# Patient Record
Sex: FEMALE | Race: WHITE | NOT HISPANIC OR LATINO | Employment: FULL TIME | URBAN - METROPOLITAN AREA
[De-identification: names, ages, dates, MRNs, and addresses within clinical notes are randomized per-mention and may not be internally consistent; named-entity substitution may affect disease eponyms.]

---

## 2017-01-24 ENCOUNTER — HOSPITAL ENCOUNTER (OUTPATIENT)
Dept: RADIOLOGY | Facility: MEDICAL CENTER | Age: 52
End: 2017-01-24
Attending: FAMILY MEDICINE
Payer: COMMERCIAL

## 2017-01-24 DIAGNOSIS — R07.9 CHEST PAIN, UNSPECIFIED: ICD-10-CM

## 2017-01-24 PROCEDURE — A9502 TC99M TETROFOSMIN: HCPCS

## 2018-04-24 ENCOUNTER — TELEPHONE (OUTPATIENT)
Dept: CARDIOLOGY | Facility: MEDICAL CENTER | Age: 53
End: 2018-04-24

## 2018-04-24 NOTE — TELEPHONE ENCOUNTER
GALIM asking patient to call back into office to find out if she has had any recent blood work done or any recent cardiac testing. Patient has a NP appt. W/ 4/25/2018 @ 9:40am*GENEVA

## 2018-04-25 ENCOUNTER — OFFICE VISIT (OUTPATIENT)
Dept: CARDIOLOGY | Facility: MEDICAL CENTER | Age: 53
End: 2018-04-25
Payer: COMMERCIAL

## 2018-04-25 VITALS
SYSTOLIC BLOOD PRESSURE: 126 MMHG | DIASTOLIC BLOOD PRESSURE: 84 MMHG | WEIGHT: 187 LBS | HEIGHT: 60 IN | HEART RATE: 80 BPM | BODY MASS INDEX: 36.71 KG/M2 | OXYGEN SATURATION: 97 %

## 2018-04-25 DIAGNOSIS — R07.89 OTHER CHEST PAIN: ICD-10-CM

## 2018-04-25 DIAGNOSIS — R00.2 PALPITATIONS: ICD-10-CM

## 2018-04-25 DIAGNOSIS — K21.9 GASTROESOPHAGEAL REFLUX DISEASE WITHOUT ESOPHAGITIS: ICD-10-CM

## 2018-04-25 DIAGNOSIS — R06.09 DYSPNEA ON EXERTION: ICD-10-CM

## 2018-04-25 DIAGNOSIS — J45.20 MILD INTERMITTENT REACTIVE AIRWAY DISEASE WITHOUT COMPLICATION: ICD-10-CM

## 2018-04-25 PROBLEM — J45.909 REACTIVE AIRWAY DISEASE: Status: ACTIVE | Noted: 2018-04-25

## 2018-04-25 LAB — EKG IMPRESSION: NORMAL

## 2018-04-25 PROCEDURE — 99204 OFFICE O/P NEW MOD 45 MIN: CPT | Performed by: INTERNAL MEDICINE

## 2018-04-25 PROCEDURE — 93000 ELECTROCARDIOGRAM COMPLETE: CPT | Performed by: INTERNAL MEDICINE

## 2018-04-25 RX ORDER — MELOXICAM 7.5 MG/1
TABLET ORAL
COMMUNITY
Start: 2018-04-12 | End: 2019-10-07

## 2018-04-25 RX ORDER — ESOMEPRAZOLE MAGNESIUM 20 MG/1
20 GRANULE, DELAYED RELEASE ORAL 2 TIMES DAILY
COMMUNITY
End: 2023-08-25

## 2018-04-25 ASSESSMENT — ENCOUNTER SYMPTOMS
CLAUDICATION: 1
HEADACHES: 1
HEARTBURN: 1

## 2018-04-25 NOTE — PROGRESS NOTES
Subjective:   Chief Complaint:   Chief Complaint   Patient presents with   • Chest Pain       Ashley Johansen is a 52 y.o. female who is referred by Dr. Mykel Tellez for chest pain. She teaches the fourth grade and friendly. She has some episodes of left upper chest and shoulder tightness which sometimes radiates to the upper arm. It has happened while driving when she is stressed, doing dishes and at rest. It typically resolve spontaneously after 5 minutes. She has some mild dyspnea on exertion which is intermittent and sometimes some mild shortness of breath when lying down. She notes mild lower extremity edema particularly after exercise. She's never had an echocardiogram. She notes some palpitations where she'll feel her heart fluttering her chest for a few moments and the results spontaneously. The palpitations are not limiting. The do not sound consistent rhythm change. She had a nuclear stress test in 2017 which was normal. She exercised on the treadmill. She's never had hypertension, hyperlipidemia, nonsmoker, no diabetes. She is adopted on her father's side and does not know her father's biologic history. She has never had syncope. He does not get dizzy or lightheaded.      I did review Dr. Tellez's office note from March 21, 2018.  No recent blood work.      DATA REVIEWED by me:  ECG April 25, 2018  NSR, 62, normal    Nuclear stress test January 24, 2017  Exercised 6 minutes and 50 seconds achieving 7 METS, no concerning issues  Normal perfusion    Most recent labs:     Last blood work available to me is from February 2014, creatinine was 0.8, potassium was 3.8, LFTs were normal  Last lipid panel from 2012 revealed total cholesterol 168, Trig 63, HDL 52,     Past Medical History:   Diagnosis Date   • Anesthesia     clautraphobic unable to tolerated mask   • Arthritis     hands and feet   • Hemorrhoids    • History of mammogram     never has had one   • Indigestion    • Infectious disease 5/2014     step throat   • Kidney disease    • Migraines    • Other specified symptom associated with female genital organs    • Pelvic exam     no history of abnormal, none in 3 years   • Pleurisy      Past Surgical History:   Procedure Laterality Date   • HYSTEROSCOPY NOVASURE-2  6/17/2014    Performed by [facility] Thomason M.D. at SURGERY SAME DAY [facility] ORS   • TUBAL COAGULATION LAPAROSCOPIC BILATERAL  1998 1998   • APPENDECTOMY     • BOWEL RESECTION     • CYSTECTOMY     • EXPLORATORY LAPAROTOMY      for surgical adhesions, polyps   • HEMORRHOIDECTOMY     • PRIMARY C SECTION       Family History   Problem Relation Age of Onset   • Adopted: Yes   • Other Mother      hypothyroid   • Diabetes Mother    • Hypertension Mother    • No Known Problems Father      Adopted by father   • No Known Problems Sister      Half sister     Social History     Social History   • Marital status:      Spouse name: N/A   • Number of children: N/A   • Years of education: N/A     Occupational History   • Not on file.     Social History Main Topics   • Smoking status: Former Smoker     Packs/day: 0.50     Years: 6.00     Types: Cigarettes     Quit date: 1/1/1996   • Smokeless tobacco: Never Used   • Alcohol use No   • Drug use: No   • Sexual activity: Yes     Other Topics Concern   • Not on file     Social History Narrative   • No narrative on file     Allergies   Allergen Reactions   • Codeine Vomiting and Swelling   • Other Environmental Hives     Kiwi fruit       Current Outpatient Prescriptions   Medication Sig Dispense Refill   • meloxicam (MOBIC) 7.5 MG Tab      • Esomeprazole Magnesium (NEXIUM) 20 MG Pack Take  by mouth.     • asa/apap/caffeine (EXCEDRIN MIGRAINE) 250-250-65 MG Tab Take 1 Tab by mouth every 6 hours as needed for Headache. 30 Tab 0   • albuterol (VENTOLIN OR PROVENTIL) 108 (90 BASE) MCG/ACT Aero Soln inhalation aerosol Inhale 2 Puffs by mouth every four hours as needed. 1 Inhaler 0     No current  facility-administered medications for this visit.        Review of Systems   Cardiovascular: Positive for chest pain, claudication and leg swelling.   Gastrointestinal: Positive for heartburn.   Neurological: Positive for headaches.   Endo/Heme/Allergies: Positive for environmental allergies.     All others systems reviewed and negative.     Objective:     Blood pressure 126/84, pulse 80, height 1.524 m (5'), weight 84.8 kg (187 lb), SpO2 97 %. Body mass index is 36.52 kg/m².    Physical Exam   General: No acute distress. Well nourished.  HEENT: EOM grossly intact, no scleral icterus, no pharyngeal erythema.   Neck:  No JVD, no bruits, trachea midline  CVS: RRR. Normal S1, S2. No M/R/G. Trivial ankle edema.  2+ radial pulses, 2+ DT pulses   Resp: CTAB. No wheezing or crackles/rhonchi. Normal respiratory effort.  Abdomen: Soft, NT, no marija hepatomegaly, obese.  MSK/Ext: No clubbing or cyanosis.  Skin: Warm and dry, no rashes.  Neurological: CN III-XII grossly intact. No focal deficits.   Psych: A&O x 3, appropriate affect, good judgement      Assessment:     1. Other chest pain  EKG    Echocardiogram Comp w/o Cont   2. Gastroesophageal reflux disease without esophagitis     3. Mild intermittent reactive airway disease without complication     4. Palpitations     5. Dyspnea on exertion  Echocardiogram Comp w/o Cont       Medical Decision Making:  Today's Assessment / Status / Plan:     1. Chest pain, atypical, prior stress test is reassuring. She does have an unknown history on her biologic father's side  2. Palpitations, not sustained  3. Mild, intermittent dyspnea on exertion  4. LE edema  5. Snoring, not clear if apnea, rare daytime sleepiness  6. GERD- EGD and C scope planned    -Blood work to be done tomorrow through PCP  -Echo to look at heart structure particularly related to palpitations and dyspnea  -He would be reasonable to initiate primary prevention aspirin therapy at an earlier age given that we do not  know about her biologic father's history  -I have provided some reassurance today that I do not think her chest pains sound cardiac and we do not need to do any further testing at this time. If her echocardiogram is normal and she is feeling well she can cancel her follow-up appointment and return as needed. I was very specific that she should come back for palpitations or chest pains or shortness of breath increasing in intensity or duration.    Return in about 4 weeks (around 5/23/2018).    It is my pleasure to participate in the care of Ms. Johansen.  Please do not hesitate to contact me with questions or concerns.    Krystina Nation MD, [facility]  Cardiologist [facility]    Please note that this dictation was created using voice recognition software. I have made every reasonable attempt to correct obvious errors, but it is possible there are errors of grammar and possibly content that I did not discover before finalizing the note.

## 2018-04-25 NOTE — PATIENT INSTRUCTIONS
-Echocardiogram- heart ultrasound  -Please get us a copy of your blood work  -If you echo is normal and you feel well you can cancel the appointment, but you are always welcome back to talk about symptoms  -Return for any change in your symtoms

## 2018-04-25 NOTE — LETTER
[facility]   [address], [address]  [place] [address]  Phone: [phone]  Fax: [phone]              Ashley Johansen  1965    Encounter Date: 4/25/2018    Krystina Nation M.D.          PROGRESS NOTE:  Subjective:   Chief Complaint:   Chief Complaint   Patient presents with   • Chest Pain       Ashley Johansen is a 52 y.o. female who is referred by Dr. Mykel Tellez for chest pain. She teaches the fourth grade and friendly. She has some episodes of left upper chest and shoulder tightness which sometimes radiates to the upper arm. It has happened while driving when she is stressed, doing dishes and at rest. It typically resolve spontaneously after 5 minutes. She has some mild dyspnea on exertion which is intermittent and sometimes some mild shortness of breath when lying down. She notes mild lower extremity edema particularly after exercise. She's never had an echocardiogram. She notes some palpitations where she'll feel her heart fluttering her chest for a few moments and the results spontaneously. The palpitations are not limiting. The do not sound consistent rhythm change. She had a nuclear stress test in 2017 which was normal. She exercised on the treadmill. She's never had hypertension, hyperlipidemia, nonsmoker, no diabetes. She is adopted on her father's side and does not know her father's biologic history. She has never had syncope. He does not get dizzy or lightheaded.      I did review Dr. Tellez's office note from March 21, 2018.  No recent blood work.      DATA REVIEWED by me:  ECG April 25, 2018  NSR, 62, normal    Nuclear stress test January 24, 2017  Exercised 6 minutes and 50 seconds achieving 7 METS, no concerning issues  Normal perfusion    Most recent labs:     Last blood work available to me is from February 2014, creatinine was 0.8, potassium was 3.8, LFTs were normal  Last lipid panel from 2012 revealed total cholesterol  168, Trig 63, HDL 52,     Past Medical History:   Diagnosis Date   • Anesthesia     clautraphobic unable to tolerated mask   • Arthritis     hands and feet   • Hemorrhoids    • History of mammogram     never has had one   • Indigestion    • Infectious disease 5/2014    step throat   • Kidney disease    • Migraines    • Other specified symptom associated with female genital organs    • Pelvic exam     no history of abnormal, none in 3 years   • Pleurisy      Past Surgical History:   Procedure Laterality Date   • HYSTEROSCOPY NOVASURE-2  6/17/2014    Performed by [facility] M.D. at SURGERY SAME DAY [facility] ORS   • TUBAL COAGULATION LAPAROSCOPIC BILATERAL  1998 1998   • APPENDECTOMY     • BOWEL RESECTION     • CYSTECTOMY     • EXPLORATORY LAPAROTOMY      for surgical adhesions, polyps   • HEMORRHOIDECTOMY     • PRIMARY C SECTION       Family History   Problem Relation Age of Onset   • Adopted: Yes   • Other Mother      hypothyroid   • Diabetes Mother    • Hypertension Mother    • No Known Problems Father      Adopted by father   • No Known Problems Sister      Half sister     Social History     Social History   • Marital status:      Spouse name: N/A   • Number of children: N/A   • Years of education: N/A     Occupational History   • Not on file.     Social History Main Topics   • Smoking status: Former Smoker     Packs/day: 0.50     Years: 6.00     Types: Cigarettes     Quit date: 1/1/1996   • Smokeless tobacco: Never Used   • Alcohol use No   • Drug use: No   • Sexual activity: Yes     Other Topics Concern   • Not on file     Social History Narrative   • No narrative on file     Allergies   Allergen Reactions   • Codeine Vomiting and Swelling   • Other Environmental Hives     Kiwi fruit       Current Outpatient Prescriptions   Medication Sig Dispense Refill   • meloxicam (MOBIC) 7.5 MG Tab      • Esomeprazole Magnesium (NEXIUM) 20 MG Pack Take  by mouth.     • asa/apap/caffeine (EXCEDRIN  MIGRAINE) 250-250-65 MG Tab Take 1 Tab by mouth every 6 hours as needed for Headache. 30 Tab 0   • albuterol (VENTOLIN OR PROVENTIL) 108 (90 BASE) MCG/ACT Aero Soln inhalation aerosol Inhale 2 Puffs by mouth every four hours as needed. 1 Inhaler 0     No current facility-administered medications for this visit.        Review of Systems   Cardiovascular: Positive for chest pain, claudication and leg swelling.   Gastrointestinal: Positive for heartburn.   Neurological: Positive for headaches.   Endo/Heme/Allergies: Positive for environmental allergies.     All others systems reviewed and negative.     Objective:     Blood pressure 126/84, pulse 80, height 1.524 m (5'), weight 84.8 kg (187 lb), SpO2 97 %. Body mass index is 36.52 kg/m².    Physical Exam   General: No acute distress. Well nourished.  HEENT: EOM grossly intact, no scleral icterus, no pharyngeal erythema.   Neck:  No JVD, no bruits, trachea midline  CVS: RRR. Normal S1, S2. No M/R/G. Trivial ankle edema.  2+ radial pulses, 2+ DT pulses   Resp: CTAB. No wheezing or crackles/rhonchi. Normal respiratory effort.  Abdomen: Soft, NT, no marija hepatomegaly, obese.  MSK/Ext: No clubbing or cyanosis.  Skin: Warm and dry, no rashes.  Neurological: CN III-XII grossly intact. No focal deficits.   Psych: A&O x 3, appropriate affect, good judgement      Assessment:     1. Other chest pain  EKG    Echocardiogram Comp w/o Cont   2. Gastroesophageal reflux disease without esophagitis     3. Mild intermittent reactive airway disease without complication     4. Palpitations     5. Dyspnea on exertion  Echocardiogram Comp w/o Cont       Medical Decision Making:  Today's Assessment / Status / Plan:     1. Chest pain, atypical, prior stress test is reassuring. She does have an unknown history on her biologic father's side  2. Palpitations, not sustained  3. Mild, intermittent dyspnea on exertion  4. LE edema  5. Snoring, not clear if apnea, rare daytime sleepiness  6. GERD-  EGD and C scope planned    -Blood work to be done tomorrow through PCP  -Echo to look at heart structure particularly related to palpitations and dyspnea  -He would be reasonable to initiate primary prevention aspirin therapy at an earlier age given that we do not know about her biologic father's history  -I have provided some reassurance today that I do not think her chest pains sound cardiac and we do not need to do any further testing at this time. If her echocardiogram is normal and she is feeling well she can cancel her follow-up appointment and return as needed. I was very specific that she should come back for palpitations or chest pains or shortness of breath increasing in intensity or duration.    Return in about 4 weeks (around 5/23/2018).    It is my pleasure to participate in the care of Ms. Johansen.  Please do not hesitate to contact me with questions or concerns.    Krystina Nation MD, [facility]  Cardiologist [facility]    Please note that this dictation was created using voice recognition software. I have made every reasonable attempt to correct obvious errors, but it is possible there are errors of grammar and possibly content that I did not discover before finalizing the note.      Mykel Tellez M.D.  [address] [address]  VIA Facsimile: [phone]

## 2018-05-11 ENCOUNTER — APPOINTMENT (OUTPATIENT)
Dept: CARDIOLOGY | Facility: MEDICAL CENTER | Age: 53
End: 2018-05-11
Attending: INTERNAL MEDICINE
Payer: COMMERCIAL

## 2018-06-19 ENCOUNTER — OFFICE VISIT (OUTPATIENT)
Dept: URGENT CARE | Facility: PHYSICIAN GROUP | Age: 53
End: 2018-06-19
Payer: COMMERCIAL

## 2018-06-19 VITALS
HEIGHT: 61 IN | SYSTOLIC BLOOD PRESSURE: 124 MMHG | BODY MASS INDEX: 35.04 KG/M2 | DIASTOLIC BLOOD PRESSURE: 82 MMHG | OXYGEN SATURATION: 98 % | HEART RATE: 85 BPM | WEIGHT: 185.6 LBS | TEMPERATURE: 98.7 F | RESPIRATION RATE: 16 BRPM

## 2018-06-19 DIAGNOSIS — J06.9 URI WITH COUGH AND CONGESTION: ICD-10-CM

## 2018-06-19 DIAGNOSIS — R06.02 SOB (SHORTNESS OF BREATH): ICD-10-CM

## 2018-06-19 DIAGNOSIS — J45.909 MILD ASTHMA WITHOUT COMPLICATION, UNSPECIFIED WHETHER PERSISTENT: ICD-10-CM

## 2018-06-19 PROCEDURE — 99214 OFFICE O/P EST MOD 30 MIN: CPT | Performed by: PHYSICIAN ASSISTANT

## 2018-06-19 RX ORDER — ALBUTEROL SULFATE 90 UG/1
2 AEROSOL, METERED RESPIRATORY (INHALATION) EVERY 6 HOURS PRN
Qty: 8.5 G | Refills: 0 | Status: SHIPPED | OUTPATIENT
Start: 2018-06-19 | End: 2020-03-17 | Stop reason: SDUPTHER

## 2018-06-19 RX ORDER — DOXYCYCLINE HYCLATE 100 MG
100 TABLET ORAL 2 TIMES DAILY
Qty: 20 TAB | Refills: 0 | Status: SHIPPED | OUTPATIENT
Start: 2018-06-19 | End: 2019-10-07

## 2018-06-19 RX ORDER — FLUTICASONE PROPIONATE 50 MCG
1 SPRAY, SUSPENSION (ML) NASAL 2 TIMES DAILY
Qty: 1 BOTTLE | Refills: 0 | Status: SHIPPED | OUTPATIENT
Start: 2018-06-19 | End: 2019-11-11 | Stop reason: SDUPTHER

## 2018-06-19 ASSESSMENT — PAIN SCALES - GENERAL: PAINLEVEL: 5=MODERATE PAIN

## 2018-06-19 NOTE — PROGRESS NOTES
Chief Complaint   Patient presents with   • Cough     1 week   • Sinus Problem       HISTORY OF PRESENT ILLNESS: Patient is a 52 y.o. female who presents today for the following:    Cough x 1 week  + nasal congestion, SOB  h/o seasonal asthma  Was using flonase and sudafed but hasn't been for a while  Green/yellow nasal drainage x 3-4 days  Dry cough - feels like it's there, just won't come up  Not sleeping due to cough  Denies fever     Patient Active Problem List    Diagnosis Date Noted   • Other chest pain 04/25/2018   • Gastroesophageal reflux disease without esophagitis 04/25/2018   • Reactive airway disease 04/25/2018   • Palpitations 04/25/2018   • Dyspnea on exertion 04/25/2018   • Thrush, oral 08/15/2014   • Excessive or frequent menstruation 06/17/2014   • Irregular periods 04/13/2012   • Weight gain 04/13/2012   • Vitamin d deficiency 04/13/2012       Allergies:Codeine and Other environmental    Current Outpatient Prescriptions Ordered in [facility]   Medication Sig Dispense Refill   • doxycycline (VIBRAMYCIN) 100 MG Tab Take 1 Tab by mouth 2 times a day. Fill if symptoms worsen at any point OR if they fail to improve over the next 7-10 days 20 Tab 0   • fluticasone (FLONASE) 50 MCG/ACT nasal spray Spray 1 Spray in nose 2 times a day. 1 Bottle 0   • albuterol 108 (90 Base) MCG/ACT Aero Soln inhalation aerosol Inhale 2 Puffs by mouth every 6 hours as needed. 8.5 g 0   • meloxicam (MOBIC) 7.5 MG Tab      • Esomeprazole Magnesium (NEXIUM) 20 MG Pack Take  by mouth.     • asa/apap/caffeine (EXCEDRIN MIGRAINE) 250-250-65 MG Tab Take 1 Tab by mouth every 6 hours as needed for Headache. 30 Tab 0   • albuterol (VENTOLIN OR PROVENTIL) 108 (90 BASE) MCG/ACT Aero Soln inhalation aerosol Inhale 2 Puffs by mouth every four hours as needed. 1 Inhaler 0     No current Epic-ordered facility-administered medications on file.        Past Medical History:   Diagnosis Date   • Anesthesia     clautraphobic unable to tolerated mask    • Arthritis     hands and feet   • Hemorrhoids    • History of mammogram     never has had one   • Indigestion    • Infectious disease 2014    step throat   • Kidney disease    • Migraines    • Other specified symptom associated with female genital organs    • Pelvic exam     no history of abnormal, none in 3 years   • Pleurisy        Social History   Substance Use Topics   • Smoking status: Former Smoker     Packs/day: 0.50     Years: 6.00     Types: Cigarettes     Quit date: 1996   • Smokeless tobacco: Never Used   • Alcohol use No       Family Status   Relation Status   • Mother Alive   • Father Other   • Maternal Grandmother    • Maternal Grandfather    • Sister Alive     Family History   Problem Relation Age of Onset   • Adopted: Yes   • Other Mother      hypothyroid   • Diabetes Mother    • Hypertension Mother    • No Known Problems Father      Adopted by father   • No Known Problems Sister      Half sister       Review of Systems:   Constitutional ROS: No unexpected change in weight, No weakness, No fatigue  Eye ROS: No recent significant change in vision, No eye pain, redness, discharge  Ear ROS: No drainage, No tinnitus or vertigo, No recent change in hearing  Mouth/Throat ROS: No teeth or gum problems, No bleeding gums, No tongue complaints  Neck ROS: No swollen glands, No significant pain in neck  Pulmonary ROS: No chronic cough, sputum, or hemoptysis, No dyspnea on exertion, No wheezing  Cardiovascular ROS: No diaphoresis, No edema, No palpitations  Gastrointestinal ROS: No change in bowel habits, No significant change in appetite, No nausea, vomiting, diarrhea, or constipation  Musculoskeletal/Extremities ROS: No peripheral edema, No pain, redness or swelling on the joints  Hematologic/Lymphatic ROS: No chills, No night sweats, No weight loss  Skin/Integumentary ROS: No edema, No evidence of rash, No itching      Exam:  Blood pressure 124/82, pulse 85, temperature 37.1 °C (98.7  "°F), resp. rate 16, height 1.549 m (5' 1\"), weight 84.2 kg (185 lb 9.6 oz), SpO2 98 %.  General: Well developed, well nourished. No distress.  Eye: PERRL/EOMI; conjunctivae clear, lids normal.  ENMT: Lips without lesions, MMM. Oropharynx is clear. Bilateral TMs are within normal limits.  Pulmonary: Unlabored respiratory effort. Lungs clear to auscultation, no wheezes, no rhonchi.  Cardiovascular: Regular rate and rhythm without murmur. No edema.   Neurologic: Grossly nonfocal. No facial asymmetry noted.  Lymph: No cervical lymphadenopathy noted.  Skin: Warm, dry, good turgor. No rashes in visible areas.   Psych: Normal mood. Alert and oriented x3. Judgment and insight is normal.    Assessment/Plan:  Discussed likely viral etiology. Discussed appropriate over-the-counter symptomatic medication, and when to return to clinic. Contingent antibiotic prescription given to patient to fill upon meeting criteria of guidelines discussed.   1. URI with cough and congestion  doxycycline (VIBRAMYCIN) 100 MG Tab    fluticasone (FLONASE) 50 MCG/ACT nasal spray   2. SOB (shortness of breath)  albuterol 108 (90 Base) MCG/ACT Aero Soln inhalation aerosol   3. Mild asthma without complication, unspecified whether persistent  albuterol 108 (90 Base) MCG/ACT Aero Soln inhalation aerosol       "

## 2018-08-02 ENCOUNTER — HOSPITAL ENCOUNTER (OUTPATIENT)
Dept: RADIOLOGY | Facility: MEDICAL CENTER | Age: 53
End: 2018-08-02
Attending: NURSE PRACTITIONER
Payer: COMMERCIAL

## 2018-08-02 ENCOUNTER — HOSPITAL ENCOUNTER (OUTPATIENT)
Dept: RADIOLOGY | Facility: MEDICAL CENTER | Age: 53
End: 2018-08-02
Attending: FAMILY MEDICINE
Payer: COMMERCIAL

## 2018-08-02 DIAGNOSIS — K21.9 DIGESTIVE SYSTEM REFLUX: ICD-10-CM

## 2018-08-02 DIAGNOSIS — R10.10 UPPER ABDOMINAL PAIN: ICD-10-CM

## 2018-08-02 DIAGNOSIS — R14.0 BLOATING: ICD-10-CM

## 2018-08-02 DIAGNOSIS — Z12.31 VISIT FOR SCREENING MAMMOGRAM: ICD-10-CM

## 2018-08-02 PROCEDURE — 77067 SCR MAMMO BI INCL CAD: CPT

## 2018-08-02 PROCEDURE — 76700 US EXAM ABDOM COMPLETE: CPT

## 2019-03-11 ENCOUNTER — OFFICE VISIT (OUTPATIENT)
Dept: URGENT CARE | Facility: PHYSICIAN GROUP | Age: 54
End: 2019-03-11
Payer: COMMERCIAL

## 2019-03-11 VITALS
SYSTOLIC BLOOD PRESSURE: 128 MMHG | TEMPERATURE: 98.7 F | HEART RATE: 82 BPM | RESPIRATION RATE: 16 BRPM | OXYGEN SATURATION: 98 % | BODY MASS INDEX: 33.07 KG/M2 | WEIGHT: 175 LBS | DIASTOLIC BLOOD PRESSURE: 82 MMHG

## 2019-03-11 DIAGNOSIS — G89.29 HIP PAIN, CHRONIC, RIGHT: ICD-10-CM

## 2019-03-11 DIAGNOSIS — M25.551 HIP PAIN, CHRONIC, RIGHT: ICD-10-CM

## 2019-03-11 PROCEDURE — 99213 OFFICE O/P EST LOW 20 MIN: CPT | Performed by: NURSE PRACTITIONER

## 2019-03-11 ASSESSMENT — ENCOUNTER SYMPTOMS
BACK PAIN: 0
FEVER: 0
CHILLS: 0
FOCAL WEAKNESS: 1
SENSORY CHANGE: 0
TINGLING: 0

## 2019-03-11 NOTE — PROGRESS NOTES
Subjective:      Ashley Johansen is a 53 y.o. female who presents with Referral Needed (for Ortho/ R hip pain)            HPI New problem. 53 year old female with history of right hip pain for 2-3 years. This pain has worsened significantly over time. She now has severe pain and difficulty ambulating. She is wanting a referral to ortho. No back or knee pain on this side. Taking tylenol only for this. Not interested in anything stronger.  Codeine and Other environmental  Current Outpatient Prescriptions on File Prior to Visit   Medication Sig Dispense Refill   • doxycycline (VIBRAMYCIN) 100 MG Tab Take 1 Tab by mouth 2 times a day. Fill if symptoms worsen at any point OR if they fail to improve over the next 7-10 days 20 Tab 0   • fluticasone (FLONASE) 50 MCG/ACT nasal spray Spray 1 Spray in nose 2 times a day. 1 Bottle 0   • albuterol 108 (90 Base) MCG/ACT Aero Soln inhalation aerosol Inhale 2 Puffs by mouth every 6 hours as needed. 8.5 g 0   • meloxicam (MOBIC) 7.5 MG Tab      • Esomeprazole Magnesium (NEXIUM) 20 MG Pack Take  by mouth.     • asa/apap/caffeine (EXCEDRIN MIGRAINE) 250-250-65 MG Tab Take 1 Tab by mouth every 6 hours as needed for Headache. 30 Tab 0   • albuterol (VENTOLIN OR PROVENTIL) 108 (90 BASE) MCG/ACT Aero Soln inhalation aerosol Inhale 2 Puffs by mouth every four hours as needed. 1 Inhaler 0     No current facility-administered medications on file prior to visit.      Social History     Social History   • Marital status:      Spouse name: N/A   • Number of children: N/A   • Years of education: N/A     Occupational History   • Not on file.     Social History Main Topics   • Smoking status: Former Smoker     Packs/day: 0.50     Years: 6.00     Types: Cigarettes     Quit date: 1/1/1996   • Smokeless tobacco: Never Used   • Alcohol use No   • Drug use: No   • Sexual activity: Yes     Other Topics Concern   • Not on file     Social History Narrative   • No narrative on file     family  history includes Diabetes in her mother; Hypertension in her mother; No Known Problems in her father and sister; Other in her mother. She was adopted.      Review of Systems   Constitutional: Negative for chills and fever.   Musculoskeletal: Positive for joint pain. Negative for back pain.   Neurological: Positive for focal weakness. Negative for tingling and sensory change.          Objective:     /82   Pulse 82   Temp 37.1 °C (98.7 °F) (Temporal)   Resp 16   Wt 79.4 kg (175 lb)   SpO2 98%   BMI 33.07 kg/m²      Physical Exam   Constitutional: She is oriented to person, place, and time. She appears well-developed and well-nourished. No distress.   Cardiovascular: Normal rate, regular rhythm and normal heart sounds.    No murmur heard.  Pulmonary/Chest: Effort normal and breath sounds normal. No respiratory distress.   Musculoskeletal: Normal range of motion.   Movement difficult and exam limited due to her discomfort.   Neurological: She is alert and oriented to person, place, and time.   Skin: Skin is warm and dry.   Psychiatric: She has a normal mood and affect. Her behavior is normal. Thought content normal.   Nursing note and vitals reviewed.              Assessment/Plan:     1. Hip pain, chronic, right  REFERRAL TO ORTHOPEDICS     States x-rays a year ago showed bone on bone arthritis in this hip.  Referral to ortho.  Differential diagnosis, natural history, supportive care, and indications for immediate follow-up discussed at length.

## 2019-03-11 NOTE — LETTER
March 11, 2019        Ashley Roman Eleno  [address] [place] [address]        Ashley was seen in our clinic today and she is excused from work for tomorrow, Wednesday and Thursday.  If you have any questions or concerns, please don't hesitate to call.        Sincerely,        [email or web address].    Electronically Signed

## 2019-10-07 ENCOUNTER — OFFICE VISIT (OUTPATIENT)
Dept: MEDICAL GROUP | Facility: MEDICAL CENTER | Age: 54
End: 2019-10-07
Payer: COMMERCIAL

## 2019-10-07 VITALS
OXYGEN SATURATION: 97 % | HEART RATE: 107 BPM | RESPIRATION RATE: 16 BRPM | TEMPERATURE: 98.3 F | WEIGHT: 184 LBS | HEIGHT: 62 IN | SYSTOLIC BLOOD PRESSURE: 128 MMHG | DIASTOLIC BLOOD PRESSURE: 80 MMHG | BODY MASS INDEX: 33.86 KG/M2

## 2019-10-07 DIAGNOSIS — Z83.3 FAMILY HISTORY OF DIABETES MELLITUS (DM): ICD-10-CM

## 2019-10-07 DIAGNOSIS — M25.551 CHRONIC RIGHT HIP PAIN: ICD-10-CM

## 2019-10-07 DIAGNOSIS — E66.9 OBESITY (BMI 30-39.9): ICD-10-CM

## 2019-10-07 DIAGNOSIS — G43.019 INTRACTABLE MIGRAINE WITHOUT AURA AND WITHOUT STATUS MIGRAINOSUS: ICD-10-CM

## 2019-10-07 DIAGNOSIS — E55.9 VITAMIN D DEFICIENCY: ICD-10-CM

## 2019-10-07 DIAGNOSIS — G89.29 CHRONIC MIDLINE LOW BACK PAIN WITH RIGHT-SIDED SCIATICA: ICD-10-CM

## 2019-10-07 DIAGNOSIS — G89.29 CHRONIC RIGHT HIP PAIN: ICD-10-CM

## 2019-10-07 DIAGNOSIS — K25.7 CHRONIC GASTRIC ULCER WITHOUT HEMORRHAGE AND WITHOUT PERFORATION: ICD-10-CM

## 2019-10-07 DIAGNOSIS — Z00.00 ANNUAL PHYSICAL EXAM: ICD-10-CM

## 2019-10-07 DIAGNOSIS — H93.8X1 SENSATION OF FULLNESS IN RIGHT EAR: ICD-10-CM

## 2019-10-07 DIAGNOSIS — K44.9 HIATAL HERNIA: ICD-10-CM

## 2019-10-07 DIAGNOSIS — M54.41 CHRONIC MIDLINE LOW BACK PAIN WITH RIGHT-SIDED SCIATICA: ICD-10-CM

## 2019-10-07 DIAGNOSIS — R63.5 WEIGHT GAIN: ICD-10-CM

## 2019-10-07 PROCEDURE — 99214 OFFICE O/P EST MOD 30 MIN: CPT | Performed by: NURSE PRACTITIONER

## 2019-10-07 RX ORDER — PROPRANOLOL HCL 60 MG
60 CAPSULE, EXTENDED RELEASE 24HR ORAL DAILY
Qty: 30 CAP | Refills: 11 | Status: SHIPPED
Start: 2019-10-07 | End: 2020-03-19

## 2019-10-07 RX ORDER — SUMATRIPTAN 100 MG/1
100 TABLET, FILM COATED ORAL
Qty: 10 TAB | Refills: 3 | Status: SHIPPED
Start: 2019-10-07 | End: 2020-03-19

## 2019-10-07 SDOH — HEALTH STABILITY: MENTAL HEALTH: HOW OFTEN DO YOU HAVE A DRINK CONTAINING ALCOHOL?: MONTHLY OR LESS

## 2019-10-07 ASSESSMENT — PATIENT HEALTH QUESTIONNAIRE - PHQ9: CLINICAL INTERPRETATION OF PHQ2 SCORE: 0

## 2019-10-07 NOTE — ASSESSMENT & PLAN NOTE
Treated for a sinus infection a few months ago.  Since then she has been feeling ear fullness and a sensation of fluid moving around in her ears.  She has had some intermittent vertigo as well.  Some intermittent mild ear pain.  No fevers, chills, body aches.  She was taking Flonase with her antibiotic, she stopped this quite some time ago.

## 2019-10-07 NOTE — LETTER
[facility]  NUVIA Ferreira.  [address]  [place] [address]  Fax: [phone]   Authorization for Release/Disclosure of   Protected Health Information   Name: CAMDEN GARCIA : 1965 SSN: [id number]   Address: [address] [place] [address]   [place] [address] Phone:    [phone] (home)    I authorize the entity listed below to release/disclose the PHI below to:   [facility]/JUSTIN Ferreira and JUSTIN Ferreira   Provider or Entity Name:  [facility]: Jorge TOBAR MD      Address   City, [facility], Zip  [address] [place] [address]    Phone:  [phone]     Fax:     Reason for request: continuity of care   Information to be released:    [  ] LAST COLONOSCOPY,  including any PATH REPORT and follow-up  [  ] LAST FIT/COLOGUARD RESULT [  ] LAST DEXA  [  ] LAST MAMMOGRAM  [  ] LAST PAP  [  ] LAST LABS [  ] RETINA EXAM REPORT  [  ] IMMUNIZATION RECORDS  [XX] Release all info      [  ] Check here and initial the line next to each item to release ALL health information INCLUDING  _____ Care and treatment for drug and / or alcohol abuse  _____ HIV testing, infection status, or AIDS  _____ Genetic Testing    DATES OF SERVICE OR TIME PERIOD TO BE DISCLOSED: _____________  I understand and acknowledge that:  * This Authorization may be revoked at any time by you in writing, except if your health information has already been used or disclosed.  * Your health information that will be used or disclosed as a result of you signing this authorization could be re-disclosed by the recipient. If this occurs, your re-disclosed health information may no longer be protected by State or Federal laws.  * You may refuse to sign this Authorization. Your refusal will not affect your ability to obtain treatment.  * This Authorization becomes effective upon signing and will  on (date) __________.      If no date is indicated, this Authorization will  one (1) year  from the signature date.    Name: Ashley Johansen    Signature:   Date:     10/7/2019       PLEASE FAX REQUESTED RECORDS BACK TO: [phone]

## 2019-10-07 NOTE — LETTER
[facility]  JUSTIN Ferreira  [address]  [place] [address]  Fax: [phone]   Authorization for Release/Disclosure of   Protected Health Information   Name: CAMDEN GARCIA : 1965 SSN: [id number]   Address: [address] [place] [address]   [place] [address] Phone:    [phone] (home)    I authorize the entity listed below to release/disclose the PHI below to:   [facility]/JUSTIN Ferreira and JUSTIN Ferreira   Provider or Entity Name:  CHRISTY WELLS MD    Address   City, [facility], Zip  [address] [facility] [place] [address]    Phone:  [phone]     Fax:     Reason for request: continuity of care   Information to be released:    [  ] LAST COLONOSCOPY,  including any PATH REPORT and follow-up  [  ] LAST FIT/COLOGUARD RESULT [  ] LAST DEXA  [  ] LAST MAMMOGRAM  [  ] LAST PAP  [  ] LAST LABS [  ] RETINA EXAM REPORT  [  ] IMMUNIZATION RECORDS  [ xx] Release all info      [  ] Check here and initial the line next to each item to release ALL health information INCLUDING  _____ Care and treatment for drug and / or alcohol abuse  _____ HIV testing, infection status, or AIDS  _____ Genetic Testing    DATES OF SERVICE OR TIME PERIOD TO BE DISCLOSED: _____________  I understand and acknowledge that:  * This Authorization may be revoked at any time by you in writing, except if your health information has already been used or disclosed.  * Your health information that will be used or disclosed as a result of you signing this authorization could be re-disclosed by the recipient. If this occurs, your re-disclosed health information may no longer be protected by State or Federal laws.  * You may refuse to sign this Authorization. Your refusal will not affect your ability to obtain treatment.  * This Authorization becomes effective upon signing and will  on (date) __________.      If no date is indicated, this Authorization will  one (1) year from the signature  date.    Name: Ashley Johansen    Signature:   Date:     10/7/2019       PLEASE FAX REQUESTED RECORDS BACK TO: [phone]

## 2019-10-07 NOTE — ASSESSMENT & PLAN NOTE
Chronic.  Established with [facility].  Taking his omeprazole daily.  Does not take NSAIDs.  Still has intermittent pain.

## 2019-10-07 NOTE — ASSESSMENT & PLAN NOTE
Chronic. New to me today. Ongoing since age 16. Has been on imitrex, diclofenac. Currently on imitrex as needed. Has never been on preventative migraine medication.     Currently having migraine twice weekly, this started in the past 6 months. In the past they were more like once every two weeks.     Would like to change medications.     The Imitrex does help after a while.     Associated symptoms include photophobia, nausea, vomiting, phonophobia, numbness on the left side of face.

## 2019-10-07 NOTE — ASSESSMENT & PLAN NOTE
Ongoing for years.  Has had x-rays and was told that she has no cartilage left in her hip, but she is too young to have a hip replacement.  She has been seeing an orthopedist and getting 6 hip injections monthly which she believes her steroids.    She is agreeable to injections, but feels monthly injections are overkill and would like another opinion about treatment options as well as some therapy.    She is unable to take NSAIDs due to her chronic gastric ulcer.

## 2019-10-07 NOTE — PROGRESS NOTES
Ashley Johansen is a 53 y.o. female here to establish care and discuss the following:    HPI:   Intractable migraine without aura and without status migrainosus  Chronic. New to me today. Ongoing since age 16. Has been on imitrex, diclofenac. Currently on imitrex as needed. Has never been on preventative migraine medication.     Currently having migraine twice weekly, this started in the past 6 months. In the past they were more like once every two weeks.     Would like to change medications.     The Imitrex does help after a while.     Associated symptoms include photophobia, nausea, vomiting, phonophobia, numbness on the left side of face.     Chronic right hip pain  Ongoing for years.  Has had x-rays and was told that she has no cartilage left in her hip, but she is too young to have a hip replacement.  She has been seeing an orthopedist and getting 6 hip injections monthly which she believes her steroids.    She is agreeable to injections, but feels monthly injections are overkill and would like another opinion about treatment options as well as some therapy.    She is unable to take NSAIDs due to her chronic gastric ulcer.    Hiatal hernia  Established with [facility].  Taking his omeprazole daily.    Chronic gastric ulcer without hemorrhage and without perforation  Chronic.  Established with [facility].  Taking his omeprazole daily.  Does not take NSAIDs.  Still has intermittent pain.    Sensation of fullness in right ear  Treated for a sinus infection a few months ago.  Since then she has been feeling ear fullness and a sensation of fluid moving around in her ears.  She has had some intermittent vertigo as well.  Some intermittent mild ear pain.  No fevers, chills, body aches.  She was taking Flonase with her antibiotic, she stopped this quite some time ago.    Current medicines (including changes today)  Current Outpatient Medications   Medication Sig Dispense Refill   • propranolol LA (INDERAL  LA) 60 MG CAPSULE SR 24 HR Take 1 Cap by mouth every day. 30 Cap 11   • sumatriptan (IMITREX) 100 MG tablet Take 1 Tab by mouth Once PRN for Migraine for up to 1 dose. 10 Tab 3   • fluticasone (FLONASE) 50 MCG/ACT nasal spray Spray 1 Spray in nose 2 times a day. 1 Bottle 0   • albuterol 108 (90 Base) MCG/ACT Aero Soln inhalation aerosol Inhale 2 Puffs by mouth every 6 hours as needed. 8.5 g 0   • Esomeprazole Magnesium (NEXIUM) 20 MG Pack Take  by mouth.       No current facility-administered medications for this visit.      She  has a past medical history of Allergy, Anesthesia, Arthritis, Hemorrhoids, Indigestion, Infectious disease (2014), Migraines, and Pleurisy.  She  has a past surgical history that includes cystectomy; hemorrhoidectomy; exploratory laparotomy; appendectomy; bowel resection; tubal coagulation laparoscopic bilateral (); primary c section; and hysteroscopy novasure-2 (2014).  Social History     Tobacco Use   • Smoking status: Former Smoker     Packs/day: 0.50     Years: 6.00     Pack years: 3.00     Types: Cigarettes     Last attempt to quit: 1996     Years since quittin.7   • Smokeless tobacco: Never Used   Substance Use Topics   • Alcohol use: Yes     Frequency: Monthly or less   • Drug use: No     Social History     Social History Narrative   • Not on file     Family History   Adopted: Yes   Problem Relation Age of Onset   • Other Mother         hypothyroid   • Diabetes Mother    • Hypertension Mother    • Hyperlipidemia Mother    • Thyroid Mother    • No Known Problems Father         Adopted by father   • No Known Problems Maternal Grandmother    • No Known Problems Maternal Grandfather    • No Known Problems Sister         Half sister     Family Status   Relation Name Status   • Mo  Alive   • Fa  Other   • MGMo     • MGFa     • Sis  Alive         ROS  No chest pain, no abdominal pain, no rash.  Positive ROS as per HPI.  All other systems reviewed and are  "negative      Objective:     /80 (BP Location: Right arm, Patient Position: Sitting, BP Cuff Size: Adult)   Pulse (!) 107   Temp 36.8 °C (98.3 °F) (Temporal)   Resp 16   Ht 1.575 m (5' 2\")   Wt 83.5 kg (184 lb)   SpO2 97%  Body mass index is 33.65 kg/m².     Physical Exam:    Constitutional: Alert, no distress.  Skin: Warm, dry, good turgor, no rashes in visible areas.  Eye: Equal, round, conjunctiva clear, lids normal.  ENMT: Lips without lesions, good dentition  Neck: Trachea midline  Respiratory: Unlabored respiratory effort  Cardiovascular: No edema.  Abdomen: Obese  Psych: Alert and oriented x3, normal affect and mood.      Assessment and Plan:   The following treatment plan was discussed    1. Chronic gastric ulcer without hemorrhage and without perforation  Stable  Continue as omeprazole daily  Continue follow-up with GI    2. Hiatal hernia  Stable  Continue as omeprazole daily  Continue follow-up with GI    3. Annual physical exam  Check labs, follow-up for results  - CBC WITH DIFFERENTIAL; Future  - Comp Metabolic Panel; Future  - HEMOGLOBIN A1C; Future  - Lipid Profile; Future  - TSH; Future  - FREE THYROXINE; Future  - VITAMIN D,25 HYDROXY; Future    4. Weight gain  - HEMOGLOBIN A1C; Future  - TSH; Future  - FREE THYROXINE; Future    5. Family history of diabetes mellitus (DM)  - HEMOGLOBIN A1C; Future    6. Vitamin D deficiency  - VITAMIN D,25 HYDROXY; Future    7. Intractable migraine without aura and without status migrainosus  Unstable  Trial of propranolol 60 mg daily, will up titrate dose as tolerated until effective  Continue Imitrex as needed  - propranolol LA (INDERAL LA) 60 MG CAPSULE SR 24 HR; Take 1 Cap by mouth every day.  Dispense: 30 Cap; Refill: 11  - sumatriptan (IMITREX) 100 MG tablet; Take 1 Tab by mouth Once PRN for Migraine for up to 1 dose.  Dispense: 10 Tab; Refill: 3    8. Chronic right hip pain  Unstable  Follow-up with physiatry  - REFERRAL TO PHYSIATRY (PMR)    9. " Chronic midline low back pain with right-sided sciatica  Unstable  Follow-up with physiatry  - REFERRAL TO PHYSIATRY (PMR)    10.  Sensation of fullness in right ear  Unstable  Effusion noted to right ear, no sign of infection  Advised Flonase daily for the next 2 to 4 weeks  Discussed that this is common after sinus or upper respiratory infections and they take time to resolve.    Records requested-Dr. Tellez in [place]  Followup: Return in about 4 weeks (around 11/4/2019) for Lab Review, migraine.    I have placed the below orders and discussed them with an approved delegating provider. The MA is performing the below orders under the direction of Dr. Morales

## 2019-10-19 LAB
25(OH)D3+25(OH)D2 SERPL-MCNC: 33.1 NG/ML (ref 30–100)
ALBUMIN SERPL-MCNC: 4.4 G/DL (ref 3.5–5.5)
ALBUMIN/GLOB SERPL: 1.7 {RATIO} (ref 1.2–2.2)
ALP SERPL-CCNC: 81 IU/L (ref 39–117)
ALT SERPL-CCNC: 14 IU/L (ref 0–32)
AMBIG ABBREV CMP14 DFLT   977206: NORMAL
AST SERPL-CCNC: 12 IU/L (ref 0–40)
BASOPHILS # BLD AUTO: 0 X10E3/UL (ref 0–0.2)
BASOPHILS NFR BLD AUTO: 0 %
BILIRUB SERPL-MCNC: 0.4 MG/DL (ref 0–1.2)
BUN SERPL-MCNC: 14 MG/DL (ref 6–24)
BUN/CREAT SERPL: 15 (ref 9–23)
CALCIUM SERPL-MCNC: 9.2 MG/DL (ref 8.7–10.2)
CHLORIDE SERPL-SCNC: 108 MMOL/L (ref 96–106)
CO2 SERPL-SCNC: 22 MMOL/L (ref 20–29)
CREAT SERPL-MCNC: 0.94 MG/DL (ref 0.57–1)
EOSINOPHIL # BLD AUTO: 0.1 X10E3/UL (ref 0–0.4)
EOSINOPHIL NFR BLD AUTO: 1 %
ERYTHROCYTE [DISTWIDTH] IN BLOOD BY AUTOMATED COUNT: 13.2 % (ref 12.3–15.4)
GLOBULIN SER CALC-MCNC: 2.6 G/DL (ref 1.5–4.5)
GLUCOSE SERPL-MCNC: 99 MG/DL (ref 65–99)
HBA1C MFR BLD: 5.3 % (ref 4.8–5.6)
HCT VFR BLD AUTO: 40.4 % (ref 34–46.6)
HGB BLD-MCNC: 13.6 G/DL (ref 11.1–15.9)
IMM GRANULOCYTES # BLD AUTO: 0 X10E3/UL (ref 0–0.1)
IMM GRANULOCYTES NFR BLD AUTO: 0 %
IMMATURE CELLS  115398: NORMAL
LYMPHOCYTES # BLD AUTO: 2 X10E3/UL (ref 0.7–3.1)
LYMPHOCYTES NFR BLD AUTO: 27 %
MCH RBC QN AUTO: 30.9 PG (ref 26.6–33)
MCHC RBC AUTO-ENTMCNC: 33.7 G/DL (ref 31.5–35.7)
MCV RBC AUTO: 92 FL (ref 79–97)
MONOCYTES # BLD AUTO: 0.6 X10E3/UL (ref 0.1–0.9)
MONOCYTES NFR BLD AUTO: 9 %
MORPHOLOGY BLD-IMP: NORMAL
NEUTROPHILS # BLD AUTO: 4.7 X10E3/UL (ref 1.4–7)
NEUTROPHILS NFR BLD AUTO: 63 %
NRBC BLD AUTO-RTO: NORMAL %
PLATELET # BLD AUTO: 282 X10E3/UL (ref 150–450)
POTASSIUM SERPL-SCNC: 4 MMOL/L (ref 3.5–5.2)
PROT SERPL-MCNC: 7 G/DL (ref 6–8.5)
RBC # BLD AUTO: 4.4 X10E6/UL (ref 3.77–5.28)
SODIUM SERPL-SCNC: 144 MMOL/L (ref 134–144)
T4 FREE SERPL-MCNC: 1.15 NG/DL (ref 0.82–1.77)
TSH SERPL DL<=0.005 MIU/L-ACNC: 1.72 UIU/ML (ref 0.45–4.5)
WBC # BLD AUTO: 7.4 X10E3/UL (ref 3.4–10.8)

## 2019-11-11 ENCOUNTER — OFFICE VISIT (OUTPATIENT)
Dept: MEDICAL GROUP | Facility: MEDICAL CENTER | Age: 54
End: 2019-11-11
Payer: COMMERCIAL

## 2019-11-11 VITALS
DIASTOLIC BLOOD PRESSURE: 72 MMHG | TEMPERATURE: 97.4 F | BODY MASS INDEX: 33.86 KG/M2 | SYSTOLIC BLOOD PRESSURE: 122 MMHG | HEIGHT: 62 IN | WEIGHT: 184 LBS | OXYGEN SATURATION: 96 % | HEART RATE: 87 BPM | RESPIRATION RATE: 16 BRPM

## 2019-11-11 DIAGNOSIS — J34.89 SINUS PAIN: ICD-10-CM

## 2019-11-11 DIAGNOSIS — E66.9 OBESITY (BMI 30-39.9): ICD-10-CM

## 2019-11-11 DIAGNOSIS — Z12.31 SCREENING MAMMOGRAM, ENCOUNTER FOR: ICD-10-CM

## 2019-11-11 DIAGNOSIS — Z23 NEED FOR VACCINATION: ICD-10-CM

## 2019-11-11 DIAGNOSIS — R61 NIGHT SWEATS: ICD-10-CM

## 2019-11-11 DIAGNOSIS — B96.89 ACUTE BACTERIAL SINUSITIS: ICD-10-CM

## 2019-11-11 DIAGNOSIS — Z98.890 S/P ENDOMETRIAL ABLATION: ICD-10-CM

## 2019-11-11 DIAGNOSIS — G43.019 INTRACTABLE MIGRAINE WITHOUT AURA AND WITHOUT STATUS MIGRAINOSUS: ICD-10-CM

## 2019-11-11 DIAGNOSIS — R63.5 WEIGHT GAIN: ICD-10-CM

## 2019-11-11 DIAGNOSIS — J01.90 ACUTE BACTERIAL SINUSITIS: ICD-10-CM

## 2019-11-11 PROCEDURE — 99214 OFFICE O/P EST MOD 30 MIN: CPT | Mod: 25 | Performed by: NURSE PRACTITIONER

## 2019-11-11 PROCEDURE — 90471 IMMUNIZATION ADMIN: CPT | Performed by: NURSE PRACTITIONER

## 2019-11-11 PROCEDURE — 90715 TDAP VACCINE 7 YRS/> IM: CPT | Performed by: NURSE PRACTITIONER

## 2019-11-11 RX ORDER — AMOXICILLIN AND CLAVULANATE POTASSIUM 875; 125 MG/1; MG/1
1 TABLET, FILM COATED ORAL 2 TIMES DAILY
Qty: 10 TAB | Refills: 0 | Status: SHIPPED | OUTPATIENT
Start: 2019-11-11 | End: 2019-11-16

## 2019-11-11 RX ORDER — FLUTICASONE PROPIONATE 50 MCG
1 SPRAY, SUSPENSION (ML) NASAL DAILY
Qty: 1 BOTTLE | Refills: 1 | Status: SHIPPED
Start: 2019-11-11 | End: 2020-03-19

## 2019-11-11 NOTE — ASSESSMENT & PLAN NOTE
Was having some ear fullness last month and sinus congestion. Now having left maxillary sinus pain and left upper tooth pain for about 1 week with green drainage. Had been using Flonase and saline rinse which has not helped.     Feels more tired than normal. Denies fevers, chills, body aches.     Has been using Sudafed as needed for this which was helping with the congestion.     Having foul tasting sinus drainage.

## 2019-11-11 NOTE — PROGRESS NOTES
Subjective:   Ashley Johansen is a 53 y.o. female here today for the following concerns:    Intractable migraine without aura and without status migrainosus  Has not started on propranolol yet as she has been having sinus issues. Plans to start once sinus issue is resolved.     Sinus pain  Was having some ear fullness last month and sinus congestion. Now having left maxillary sinus pain and left upper tooth pain for about 1 week with green drainage. Had been using Flonase and saline rinse which has not helped.     Feels more tired than normal. Denies fevers, chills, body aches.     Has been using Sudafed as needed for this which was helping with the congestion.     Having foul tasting sinus drainage.     Weight gain  Patient having difficulty losing weight.  She has been exercising regularly on a stationary bike.  For breakfast she usually has a hard boiled egg or something light, for lunch a lean cuisine, for dinner a small protein and lots of vegetables.  She does not snack.  She does not eat a significant amount of carbs or sugar.  Unsure if she is postmenopausal as she had an ablation done years ago.      Current medicines (including changes today)  Current Outpatient Medications   Medication Sig Dispense Refill   • fluticasone (FLONASE) 50 MCG/ACT nasal spray Spray 1 Spray in nose every day. 1 Bottle 1   • amoxicillin-clavulanate (AUGMENTIN) 875-125 MG Tab Take 1 Tab by mouth 2 times a day for 5 days. 10 Tab 0   • propranolol LA (INDERAL LA) 60 MG CAPSULE SR 24 HR Take 1 Cap by mouth every day. 30 Cap 11   • sumatriptan (IMITREX) 100 MG tablet Take 1 Tab by mouth Once PRN for Migraine for up to 1 dose. 10 Tab 3   • albuterol 108 (90 Base) MCG/ACT Aero Soln inhalation aerosol Inhale 2 Puffs by mouth every 6 hours as needed. 8.5 g 0   • Esomeprazole Magnesium (NEXIUM) 20 MG Pack Take  by mouth.       No current facility-administered medications for this visit.      She  has a past medical history of  "Allergy, Anesthesia, Arthritis, Hemorrhoids, Indigestion, Infectious disease (5/2014), Migraines, and Pleurisy.    ROS   No chest pain, no shortness of breath, no abdominal pain  Positive ROS as per HPI.  All other systems reviewed and are negative.     Objective:     /72 (BP Location: Right arm, Patient Position: Sitting, BP Cuff Size: Adult)   Pulse 87   Temp 36.3 °C (97.4 °F) (Temporal)   Resp 16   Ht 1.575 m (5' 2\")   Wt 83.5 kg (184 lb)   SpO2 96%  Body mass index is 33.65 kg/m².     Physical Exam:  Constitutional: Alert, no distress.  Skin: Warm, dry, good turgor, no rashes in visible areas.  Eye: Equal, round, conjunctiva clear, lids normal.  ENMT: Lips without lesions, good dentition, oropharynx clear. Left nasal turbinate erythema, edema, and thick nasal drainage, tenderness to palpation.   Neck: Trachea midline, no masses, no thyromegaly. No cervical or supraclavicular lymphadenopathy  Respiratory: Unlabored respiratory effort, lungs clear to auscultation, no wheezes, no ronchi.  Cardiovascular: Normal S1, S2, no murmur, no edema.  Psych: Alert and oriented x3, normal affect and mood.      Assessment and Plan:   The following treatment plan was discussed    1. Sinus pain  Unstable  Appears to be bacterial sinus infection.    2. Intractable migraine without aura and without status migrainosus  Unstable  Start propranolol after sinus infection resolves    3. S/P endometrial ablation  Check hormones  - FSH/LH; Future    4. Night sweats  - FSH/LH; Future    5. Acute bacterial sinusitis  Unstable  Augmentin twice daily for 5 days  Flonase refilled  Continue Sudafed  - fluticasone (FLONASE) 50 MCG/ACT nasal spray; Spray 1 Spray in nose every day.  Dispense: 1 Bottle; Refill: 1  - amoxicillin-clavulanate (AUGMENTIN) 875-125 MG Tab; Take 1 Tab by mouth 2 times a day for 5 days.  Dispense: 10 Tab; Refill: 0    7. Weight gain  Unstable  Follow-up with health improvement program  - REFERRAL TO [facility] " HEALTH IMPROVEMENT PROGRAMS (HIP) Services Requested: [facility]; Reason for Referral? BMI>30; Reason for Visit: Overweight/Obesity    8. Obesity (BMI 30-39.9)  - REFERRAL TO [facility] HEALTH IMPROVEMENT PROGRAMS (HIP) Services Requested: [facility] Medical Weight Management Program; Reason for Referral? BMI>30; Reason for Visit: Overweight/Obesity    9. Need for vaccination  Tdap given, influenza vaccine declined  - Tdap =>8yo IM    10. Screening mammogram, encounter for  - MA-SCREEN MAMMO W/CAD-BILAT; Future      Followup: Return in about 2 months (around 1/11/2020).    I have placed the below orders and discussed them with an approved delegating provider. The MA is performing the below orders under the direction of Dr. Morales

## 2019-11-11 NOTE — ASSESSMENT & PLAN NOTE
Patient having difficulty losing weight.  She has been exercising regularly on a stationary bike.  For breakfast she usually has a hard boiled egg or something light, for lunch a lean cuisine, for dinner a small protein and lots of vegetables.  She does not snack.  She does not eat a significant amount of carbs or sugar.  Unsure if she is postmenopausal as she had an ablation done years ago.

## 2019-11-11 NOTE — ASSESSMENT & PLAN NOTE
Has not started on propranolol yet as she has been having sinus issues. Plans to start once sinus issue is resolved.

## 2019-11-26 ENCOUNTER — OFFICE VISIT (OUTPATIENT)
Dept: URGENT CARE | Facility: PHYSICIAN GROUP | Age: 54
End: 2019-11-26
Payer: COMMERCIAL

## 2019-11-26 VITALS
WEIGHT: 184 LBS | TEMPERATURE: 98.9 F | OXYGEN SATURATION: 98 % | DIASTOLIC BLOOD PRESSURE: 86 MMHG | HEART RATE: 88 BPM | RESPIRATION RATE: 16 BRPM | SYSTOLIC BLOOD PRESSURE: 124 MMHG | BODY MASS INDEX: 33.65 KG/M2

## 2019-11-26 DIAGNOSIS — J01.01 ACUTE RECURRENT MAXILLARY SINUSITIS: ICD-10-CM

## 2019-11-26 PROCEDURE — 99214 OFFICE O/P EST MOD 30 MIN: CPT | Performed by: FAMILY MEDICINE

## 2019-11-26 RX ORDER — AZITHROMYCIN 250 MG/1
TABLET, FILM COATED ORAL
Qty: 6 TAB | Refills: 0 | Status: SHIPPED
Start: 2019-11-26 | End: 2020-03-19

## 2019-11-26 ASSESSMENT — ENCOUNTER SYMPTOMS
EYE REDNESS: 0
MYALGIAS: 0
VOMITING: 0
WHEEZING: 0
NAUSEA: 0
EYE DISCHARGE: 0
WEIGHT LOSS: 0

## 2019-11-26 NOTE — PROGRESS NOTES
Subjective:      Ashley Johansen is a 53 y.o. female who presents with Sinusitis            1 month sinus pressure and drainage.  Subjective fever.  PMH sinusitis.  No sinus surgery.  Sore throat due to drainage.  No cough. Symptoms are moderate severity and progressively worse.  Minimal relief with OTC medications.  No other aggravating or alleviating factors.      Review of Systems   Constitutional: Negative for malaise/fatigue and weight loss.   Eyes: Negative for discharge and redness.   Respiratory: Negative for wheezing.    Gastrointestinal: Negative for nausea and vomiting.   Musculoskeletal: Negative for joint pain and myalgias.   Skin: Negative for itching and rash.   .  Medications, Allergies, and current problem list reviewed today in Epic         Objective:     /86   Pulse 88   Temp 37.2 °C (98.9 °F) (Temporal)   Resp 16   Wt 83.5 kg (184 lb)   SpO2 98%   BMI 33.65 kg/m²      Physical Exam  Constitutional:       General: She is not in acute distress.     Appearance: She is well-developed.   HENT:      Head: Normocephalic and atraumatic.      Comments: Tender maxillary sinus bilateral, +PND     Right Ear: Tympanic membrane normal.      Left Ear: Tympanic membrane normal.   Eyes:      Conjunctiva/sclera: Conjunctivae normal.   Cardiovascular:      Rate and Rhythm: Normal rate and regular rhythm.      Heart sounds: Normal heart sounds. No murmur.   Pulmonary:      Effort: Pulmonary effort is normal.      Breath sounds: Normal breath sounds. No wheezing.   Skin:     General: Skin is warm and dry.      Findings: No rash.   Neurological:      Mental Status: She is alert and oriented to person, place, and time.                 Assessment/Plan:     1. Acute recurrent maxillary sinusitis  azithromycin (ZITHROMAX) 250 MG Tab     Differential diagnosis, natural history, supportive care, and indications for immediate follow-up discussed at length.     Nasal saline, decongestant, nasal CS

## 2020-03-16 ENCOUNTER — TELEPHONE (OUTPATIENT)
Dept: EMERGENCY MEDICINE | Facility: MEDICAL CENTER | Age: 55
End: 2020-03-16

## 2020-03-16 NOTE — PROGRESS NOTES
1. Caller Name: Ashley Johansen  Call Back Number: [phone]  [facility] PCP or Specialty Provider: Yes Gilda Richards        2.  Does patient have any active symptoms of respiratory illness (fever OR cough OR shortness of breath)? Yes, the patient reports the following respiratory symptoms: shortness of breath and pt states she's a little short of breath x 3 days- has been worsening.  If walking around she gets short of breath. Also has a sore throat- not difficult to swallow- more of an annoyance. Albuterol inhaler hasn't really been giving her much relief for the shortness of breath.    3.  Does patient have any comoribidities? None Has an inhaler for shortness of breath during allergy season.    4.  In the last 30 days, has the patient traveled outside of the country OR in a high risk area within the  OR have any known contact with someone who has or is suspected to have COVID-19?  No.    5. Disposition: Advised to perform self care, monitor for worsening symptoms and to call back in 3 days if no improvement Will request PCP contact patient to discuss further options    Note routed to PCP: Provider action needed: Request PCP to call patient regarding shortness of breath potentially due to allergies.

## 2020-03-17 ENCOUNTER — TELEPHONE (OUTPATIENT)
Dept: MEDICAL GROUP | Facility: MEDICAL CENTER | Age: 55
End: 2020-03-17

## 2020-03-17 DIAGNOSIS — R06.02 SOB (SHORTNESS OF BREATH): ICD-10-CM

## 2020-03-17 DIAGNOSIS — J45.909 MILD ASTHMA WITHOUT COMPLICATION, UNSPECIFIED WHETHER PERSISTENT: ICD-10-CM

## 2020-03-17 RX ORDER — ALBUTEROL SULFATE 90 UG/1
2 AEROSOL, METERED RESPIRATORY (INHALATION) EVERY 6 HOURS PRN
Qty: 8.5 G | Refills: 3 | Status: SHIPPED | OUTPATIENT
Start: 2020-03-17 | End: 2020-04-09

## 2020-03-17 NOTE — TELEPHONE ENCOUNTER
Called patient, voicemail left to return call or send [employer] message.     NUVIA Ferreira.

## 2020-03-19 ENCOUNTER — OFFICE VISIT (OUTPATIENT)
Dept: URGENT CARE | Facility: PHYSICIAN GROUP | Age: 55
End: 2020-03-19
Payer: COMMERCIAL

## 2020-03-19 ENCOUNTER — APPOINTMENT (OUTPATIENT)
Dept: RADIOLOGY | Facility: IMAGING CENTER | Age: 55
End: 2020-03-19
Attending: FAMILY MEDICINE
Payer: COMMERCIAL

## 2020-03-19 VITALS
TEMPERATURE: 98.1 F | WEIGHT: 190 LBS | SYSTOLIC BLOOD PRESSURE: 132 MMHG | RESPIRATION RATE: 18 BRPM | HEART RATE: 79 BPM | DIASTOLIC BLOOD PRESSURE: 76 MMHG | OXYGEN SATURATION: 99 % | BODY MASS INDEX: 35.87 KG/M2 | HEIGHT: 61 IN

## 2020-03-19 DIAGNOSIS — R00.2 PALPITATION: ICD-10-CM

## 2020-03-19 DIAGNOSIS — R05.9 COUGH: ICD-10-CM

## 2020-03-19 DIAGNOSIS — J06.9 VIRAL URI WITH COUGH: ICD-10-CM

## 2020-03-19 LAB
FLUAV+FLUBV AG SPEC QL IA: NEGATIVE
INT CON NEG: NORMAL
INT CON POS: NORMAL

## 2020-03-19 PROCEDURE — 93000 ELECTROCARDIOGRAM COMPLETE: CPT | Performed by: FAMILY MEDICINE

## 2020-03-19 PROCEDURE — 71046 X-RAY EXAM CHEST 2 VIEWS: CPT | Mod: TC,FY | Performed by: FAMILY MEDICINE

## 2020-03-19 PROCEDURE — 87804 INFLUENZA ASSAY W/OPTIC: CPT | Performed by: FAMILY MEDICINE

## 2020-03-19 PROCEDURE — 99214 OFFICE O/P EST MOD 30 MIN: CPT | Performed by: FAMILY MEDICINE

## 2020-03-19 RX ORDER — PREDNISONE 20 MG/1
TABLET ORAL
Qty: 10 TAB | Refills: 0 | Status: SHIPPED | OUTPATIENT
Start: 2020-03-19 | End: 2020-04-09

## 2020-03-19 RX ORDER — ALBUTEROL SULFATE 90 UG/1
2 AEROSOL, METERED RESPIRATORY (INHALATION) EVERY 4 HOURS PRN
Qty: 1 INHALER | Refills: 0 | Status: SHIPPED | OUTPATIENT
Start: 2020-03-19

## 2020-03-19 ASSESSMENT — FIBROSIS 4 INDEX: FIB4 SCORE: 0.6

## 2020-03-19 NOTE — PATIENT INSTRUCTIONS
You have a viral illness causing reactive airways.  Your x-ray was negative  You improved with albuterol so recommend using albuterol inhaler with the spacer every 4 hours as needed.  Oral steroid daily for 5 days to help decrease the inflammation in the airways.  Your EKG did not show any acute abnormalities on my read but based on your history of palpitation would recommend further evaluation in the emergency department setting in that regard.    Your flu test was negative

## 2020-03-19 NOTE — PROGRESS NOTES
"Subjective:      Ashley Johansen is a 54 y.o. female who presents with Shortness of Breath (cough, cough irritating throat, laying down makes it much worse x3 days )            This is a new problem.  54-year-old presents for evaluation of trouble breathing shortness of breath, coughing and irritation in her throat.  She has had also intermittent palpitations specially at night.  She denies any history of heart disease.  She has had some leftover inhaler that she has been using with minimal relief.  Last use of albuterol was yesterday around noon.  She denies any travel history, fever or chills.  She did not receive a flu shot this season.      Review of Systems   All other systems reviewed and are negative.         Objective:     /76   Pulse 79   Temp 36.7 °C (98.1 °F)   Resp 18   Ht 1.549 m (5' 1\")   Wt 86.2 kg (190 lb)   SpO2 99%   BMI 35.90 kg/m²      Physical Exam  Constitutional:       General: She is not in acute distress.     Appearance: She is not ill-appearing, toxic-appearing or diaphoretic.   HENT:      Head: Normocephalic and atraumatic.      Right Ear: Tympanic membrane, ear canal and external ear normal.      Left Ear: Tympanic membrane, ear canal and external ear normal.      Nose: No rhinorrhea.      Mouth/Throat:      Mouth: Mucous membranes are moist.      Pharynx: Oropharynx is clear. No oropharyngeal exudate or posterior oropharyngeal erythema.   Eyes:      Conjunctiva/sclera: Conjunctivae normal.   Neck:      Musculoskeletal: Neck supple.   Cardiovascular:      Rate and Rhythm: Normal rate and regular rhythm.      Heart sounds: No murmur. No friction rub. No gallop.    Pulmonary:      Effort: Pulmonary effort is normal. Prolonged expiration present. No respiratory distress.      Breath sounds: No stridor. No wheezing, rhonchi or rales.      Comments: After neb patient feels a lot better.  Lymphadenopathy:      Cervical: No cervical adenopathy.   Skin:     General: Skin is " warm.      Coloration: Skin is not jaundiced or pale.   Neurological:      Mental Status: She is alert and oriented to person, place, and time.   Psychiatric:         Mood and Affect: Mood normal.       EKG shows normal sinus rhythm, nonspecific T wave changes noted, no acute ST or T changes noted per     Chest x-ray is negative    Flu test is negative     Assessment/Plan:   ASSESSMENT:PLAN:  1. Viral URI with cough  - albuterol 108 (90 Base) MCG/ACT Aero Soln inhalation aerosol; Inhale 2 Puffs by mouth every four hours as needed (wheezing).  Dispense: 1 Inhaler; Refill: 0  - predniSONE (DELTASONE) 20 MG Tab; Take 40mg daily x 5 days  Dispense: 10 Tab; Refill: 0  - Spacer/Aero-Holding Chambers (E-Z SPACER) Device; Use as directed  Dispense: 1 Device; Refill: 0    2. Palpitation  - EKG - Clinic Performed    3. Cough  - DX-CHEST-2 VIEWS; Future  - POCT Influenza A/B      We discussed that she has a viral illness with mild reactive airways for which I wrote some medication for her as mentioned above.  In regards to her palpitation would recommend further evaluation in the ED as she is in the right age group that would require further work-up to rule out cardiac causes.  Plan per orders and instructions  Warning signs reviewed

## 2020-04-09 ENCOUNTER — TELEMEDICINE (OUTPATIENT)
Dept: CARDIOLOGY | Facility: MEDICAL CENTER | Age: 55
End: 2020-04-09
Payer: COMMERCIAL

## 2020-04-09 VITALS — HEIGHT: 61 IN | WEIGHT: 184 LBS | BODY MASS INDEX: 34.74 KG/M2

## 2020-04-09 DIAGNOSIS — R07.89 OTHER CHEST PAIN: ICD-10-CM

## 2020-04-09 DIAGNOSIS — R06.09 DYSPNEA ON EXERTION: ICD-10-CM

## 2020-04-09 DIAGNOSIS — K44.9 HIATAL HERNIA: ICD-10-CM

## 2020-04-09 DIAGNOSIS — R42 LIGHTHEADED: ICD-10-CM

## 2020-04-09 DIAGNOSIS — J45.20 MILD INTERMITTENT REACTIVE AIRWAY DISEASE WITHOUT COMPLICATION: ICD-10-CM

## 2020-04-09 DIAGNOSIS — E78.00 PURE HYPERCHOLESTEROLEMIA: ICD-10-CM

## 2020-04-09 DIAGNOSIS — K21.9 GASTROESOPHAGEAL REFLUX DISEASE WITHOUT ESOPHAGITIS: ICD-10-CM

## 2020-04-09 DIAGNOSIS — R00.2 PALPITATIONS: ICD-10-CM

## 2020-04-09 PROCEDURE — 99214 OFFICE O/P EST MOD 30 MIN: CPT | Mod: 95,CR | Performed by: INTERNAL MEDICINE

## 2020-04-09 ASSESSMENT — FIBROSIS 4 INDEX: FIB4 SCORE: 0.6

## 2020-04-09 NOTE — PATIENT INSTRUCTIONS
-Echocardiogram- heart pictures to look at the heart structure and pump function.  -Josiaho Patch, heart monitor, 2 weeks, evaluate the electrical system of the heart  -Blood test: troponin, enzyme in the blood that picks up strain on the heart. Not fasting, at your convenience.    -If you are having a heart rhythm change, I will probably ask you to eventually get a sleep study to look for apnea but this will be delayed due to COVID 19.    -When we have captured the heart rhythm problem, we will want to try to suppress it with medications.  The medication is intended to help you feel better, if you feel worse for any reason stop the medication and let me know.  If we start the medication at a low dose and you do not feel better, you can go up on the medication on your own by half a tablet.    We have a few options:    1- metoprolol tartrate 12.5 mg AM and/or PM, this medication is a 12 hour medication.  If you do not feel symptom relief, you can increase the medication by half a tablet until you feel better.  It can slow down your resting heart rate but I am not worried about that.  It can lower blood pressure slightly but I am not worried about that.  The maximum dose is 100 mg twice a day.    2-Cardizem/diltiazem 120 mg once daily, this is a 24-hour medication.  You can increase on your own, maximum dose is 360 mg daily.  Side effects include mild constipation, mild ankle swelling which is cosmetic only and not worrisome.  Again, consider lower blood pressure and heart rate slightly.

## 2020-04-09 NOTE — LETTER
[facility]   [address], [address]  [place] [address]  Phone: [phone]  Fax: [phone]              Ashley Johansen  1965    Encounter Date: 4/9/2020    Krystina Nation M.D.          PROGRESS NOTE:  Subjective:   Chief Complaint:   Chief Complaint   Patient presents with   • Chest Pain       Ashley Johansen is a 54 y.o. female who returns for atypical chest pain, VERONICA and now palpitations.     She has been waking up at night, having discomfort in lower part of left breast, notes heart is racing, sits up, takes deep breaths, happening over the past few weeks.  Was feeling very lightheaded the next day and chest felt tight, radiated to throat.  Feels like it is racing quickly, not sure if it was irregular.  Previously palpitations did not bother her.    She does snore,  concerned for apnea, not clear if apnea, rare daytime sleepiness.    She has an inhaler, gets reactive airway but not asthma, not prior asthma, only in [place].    Has GERD- EGD and C scope done.  Has hiatal hernia and ulcer, started PPI.    She previously had episodes of left upper chest and shoulder tightness which sometimes radiates to the upper arm. It has happened while driving when she is stressed, doing dishes and at rest. It typically resolve spontaneously after 5 minutes. She had a nuclear stress test in 2017 which was normal. She exercised on the treadmill.      She has some mild dyspnea on exertion which is intermittent and sometimes some mild shortness of breath when lying down.   She notes mild lower extremity edema particularly after exercise.   She's never had an echocardiogram.     Has very mildly elevated LDL of 103.  HDL protective.  No hypertension.  Lifelong nonsmoker.  No diabetes.   She is adopted on her father's side and does not know her father's biologic history.     She has never had syncope.   She does not get dizzy or lightheaded.    Lives in  [place] with her .  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  Has been walking,  at the gym.      DATA REVIEWED by me:  ECG April 25, 2018  NSR, 62, normal    Nuclear stress test January 24, 2017  Exercised 6 minutes and 50 seconds achieving 7 METS, no concerning issues  Normal perfusion    Most recent labs:     Lab Results   Component Value Date/Time    HEMOGLOBIN 13.6 10/16/2019 09:00 PM    HEMOGLOBIN 11.8 (L) 02/25/2014 04:25 PM    HEMATOCRIT 40.4 10/16/2019 09:00 PM    HEMATOCRIT 35.2 (L) 02/25/2014 04:25 PM    MCV 92 10/16/2019 09:00 PM    MCV 90.7 02/25/2014 04:25 PM    TSH 1.720 10/16/2019 09:00 PM      Lab Results   Component Value Date/Time    SODIUM 144 10/16/2019 09:00 PM    SODIUM 138 02/25/2014 04:25 PM    POTASSIUM 4.0 10/16/2019 09:00 PM    POTASSIUM 3.8 02/25/2014 04:25 PM    CHLORIDE 108 (H) 10/16/2019 09:00 PM    CHLORIDE 106 02/25/2014 04:25 PM    CO2 22 10/16/2019 09:00 PM    CO2 24 02/25/2014 04:25 PM    GLUCOSE 99 10/16/2019 09:00 PM    GLUCOSE 87 02/25/2014 04:25 PM    BUN 14 10/16/2019 09:00 PM    BUN 13 02/25/2014 04:25 PM    CREATININE 0.94 10/16/2019 09:00 PM    CREATININE 0.80 02/25/2014 04:25 PM      Lab Results   Component Value Date/Time    ASTSGOT 12 10/16/2019 09:00 PM    ASTSGOT 10 (L) 02/25/2014 04:25 PM    ALTSGPT 14 10/16/2019 09:00 PM    ALTSGPT 8 02/25/2014 04:25 PM    ALBUMIN 4.4 10/16/2019 09:00 PM    ALBUMIN 4.3 02/25/2014 04:25 PM      Lab Results   Component Value Date/Time    CHOLSTRLTOT 168 02/24/2012 09:58 AM     02/24/2012 09:58 AM    HDL 52 02/24/2012 09:58 AM    TRIGLYCERIDE 63 02/24/2012 09:58 AM       Last blood work available to me is from February 2014, creatinine was 0.8, potassium was 3.8, LFTs were normal  Last lipid panel from 2012 revealed total cholesterol 168, Trig 63, HDL 52,     Past Medical History:   Diagnosis Date   • Allergy    • Anesthesia     clautraphobic unable to tolerated mask   • Arthritis     hands and feet   •  Hemorrhoids    • Indigestion    • Infectious disease 2014    step throat   • Migraines    • Pleurisy      Past Surgical History:   Procedure Laterality Date   • HYSTEROSCOPY NOVASURE-2  2014    Performed by [facility] M.D. at SURGERY SAME DAY [facility]   • TUBAL COAGULATION LAPAROSCOPIC BILATERAL  1998   • APPENDECTOMY     • BOWEL RESECTION     • CYSTECTOMY     • EXPLORATORY LAPAROTOMY      for surgical adhesions, polyps   • HEMORRHOIDECTOMY     • PRIMARY C SECTION       Family History   Adopted: Yes   Problem Relation Age of Onset   • Other Mother         hypothyroid   • Diabetes Mother    • Hypertension Mother    • Hyperlipidemia Mother    • Thyroid Mother    • No Known Problems Father         Adopted by father   • No Known Problems Maternal Grandmother    • No Known Problems Maternal Grandfather    • No Known Problems Sister         Half sister     Social History     Socioeconomic History   • Marital status:      Spouse name: Not on file   • Number of children: Not on file   • Years of education: Not on file   • Highest education level: Not on file   Occupational History   • Not on file   Social Needs   • Financial resource strain: Not on file   • Food insecurity     Worry: Not on file     Inability: Not on file   • Transportation needs     Medical: Not on file     Non-medical: Not on file   Tobacco Use   • Smoking status: Former Smoker     Packs/day: 0.50     Years: 6.00     Pack years: 3.00     Types: Cigarettes     Last attempt to quit: 1996     Years since quittin.2   • Smokeless tobacco: Never Used   Substance and Sexual Activity   • Alcohol use: Yes     Frequency: Monthly or less   • Drug use: No   • Sexual activity: Yes   Lifestyle   • Physical activity     Days per week: Not on file     Minutes per session: Not on file   • Stress: Not on file   Relationships   • Social connections     Talks on phone: Not on file     Gets together: Not on file     Attends Bahai  "service: Not on file     Active member of club or organization: Not on file     Attends meetings of clubs or organizations: Not on file     Relationship status: Not on file   • Intimate partner violence     Fear of current or ex partner: Not on file     Emotionally abused: Not on file     Physically abused: Not on file     Forced sexual activity: Not on file   Other Topics Concern   • Not on file   Social History Narrative   • Not on file     Allergies   Allergen Reactions   • Codeine Vomiting and Swelling   • Other Environmental Hives     Kiwi fruit       Current Outpatient Medications   Medication Sig Dispense Refill   • albuterol 108 (90 Base) MCG/ACT Aero Soln inhalation aerosol Inhale 2 Puffs by mouth every four hours as needed (wheezing). 1 Inhaler 0   • Esomeprazole Magnesium (NEXIUM) 20 MG Pack Take  by mouth.       No current facility-administered medications for this visit.        ROS  All others systems reviewed and negative.     Objective:     Ht 1.549 m (5' 1\")   Wt 83.5 kg (184 lb)  Body mass index is 34.77 kg/m².    This encounter was conducted via Zoom .   Verbal consent was obtained. Patient's identity was verified.      Vitals obtained by patient:  None, pt does not have BP cuff, BP 3- was 132/76    Physical Exam:  General: No acute distress. Well nourished.   HEENT: EOM grossly intact, no scleral icterus, no pharyngeal erythema.   Neck:  No JVD noted at 90 degrees, trachea midline  CVS: Pulse as reported by patient, no visible LE edema.  Resp: Unlabored respiratory effort, no cough or audible wheeze  MSK/Ext: No clubbing or cyanosis visible appreciated.  Skin: No rashes in visible areas.  Neurological: CN III-XII grossly intact. No focal deficits.   Psych: A&O x 3, appropriate affect, good judgement, well groomed      Assessment:     1. Other chest pain  TROPONIN 1   2. Dyspnea on exertion  EC-ECHOCARDIOGRAM COMPLETE W/O CONT   3. Mild intermittent reactive airway disease without " complication     4. Palpitations  ZIO PATCH MONITOR    EC-ECHOCARDIOGRAM COMPLETE W/O CONT   5. Gastroesophageal reflux disease without esophagitis     6. Pure hypercholesterolemia     7. Lightheaded  ZIO PATCH MONITOR    EC-ECHOCARDIOGRAM COMPLETE W/O CONT   8. Hiatal hernia         Medical Decision Making:  Today's Assessment / Status / Plan:     -Needs zio, then trial BB, watch for worsening airway, may need CCB instead, see below.  -Blood work including troponin  -Echo  -No ASA for now given prior ulcer, has been on PPI, would do ASA if we find afib.    Written instructions given today:    -Echocardiogram- heart pictures to look at the heart structure and pump function.  -Zio Patch, heart monitor, 2 weeks, evaluate the electrical system of the heart  -Blood test: troponin, enzyme in the blood that picks up strain on the heart. Not fasting, at your convenience.    -If you are having a heart rhythm change, I will probably ask you to eventually get a sleep study to look for apnea but this will be delayed due to COVID 19.    -When we have captured the heart rhythm problem, we will want to try to suppress it with medications.  The medication is intended to help you feel better, if you feel worse for any reason stop the medication and let me know.  If we start the medication at a low dose and you do not feel better, you can go up on the medication on your own by half a tablet.    We have a few options:    1- metoprolol tartrate 12.5 mg AM and/or PM, this medication is a 12 hour medication.  If you do not feel symptom relief, you can increase the medication by half a tablet until you feel better.  It can slow down your resting heart rate but I am not worried about that.  It can lower blood pressure slightly but I am not worried about that.  The maximum dose is 100 mg twice a day.    2-Cardizem/diltiazem 120 mg once daily, this is a 24-hour medication.  You can increase on your own, maximum dose is 360 mg daily.  Side  effects include mild constipation, mild ankle swelling which is cosmetic only and not worrisome.  Again, consider lower blood pressure and heart rate slightly.    Return in about 4 weeks (around 5/7/2020).    It is my pleasure to participate in the care of Ms. Johansen.  Please do not hesitate to contact me with questions or concerns.    Krystina Nation MD, [facility]  Cardiologist [facility]    Please note that this dictation was created using voice recognition software. I have made every reasonable attempt to correct obvious errors, but it is possible there are errors of grammar and possibly content that I did not discover before finalizing the note.      Gilda Richards, A.P.R.N.  [address]  [address]  [place] [address]  VIA In Basket

## 2020-04-09 NOTE — PROGRESS NOTES
Subjective:   Chief Complaint:   Chief Complaint   Patient presents with   • Chest Pain       Ashley Johansen is a 54 y.o. female who returns for atypical chest pain, VERONICA and now palpitations.     She has been waking up at night, having discomfort in lower part of left breast, notes heart is racing, sits up, takes deep breaths, happening over the past few weeks.  Was feeling very lightheaded the next day and chest felt tight, radiated to throat.  Feels like it is racing quickly, not sure if it was irregular.  Previously palpitations did not bother her.    She does snore,  concerned for apnea, not clear if apnea, rare daytime sleepiness.    She has an inhaler, gets reactive airway but not asthma, not prior asthma, only in [place].    Has GERD- EGD and C scope done.  Has hiatal hernia and ulcer, started PPI.    She previously had episodes of left upper chest and shoulder tightness which sometimes radiates to the upper arm. It has happened while driving when she is stressed, doing dishes and at rest. It typically resolve spontaneously after 5 minutes. She had a nuclear stress test in 2017 which was normal. She exercised on the treadmill.      She has some mild dyspnea on exertion which is intermittent and sometimes some mild shortness of breath when lying down.   She notes mild lower extremity edema particularly after exercise.   She's never had an echocardiogram.     Has very mildly elevated LDL of 103.  HDL protective.  No hypertension.  Lifelong nonsmoker.  No diabetes.   She is adopted on her father's side and does not know her father's biologic history.     She has never had syncope.   She does not get dizzy or lightheaded.    Lives in [place] with her .  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  Has been walking,  at the gym.      DATA REVIEWED by me:  ECG April 25, 2018  NSR, 62, normal    Nuclear stress test January 24, 2017  Exercised 6 minutes and 50 seconds achieving 7 METS, no  concerning issues  Normal perfusion    Most recent labs:     Lab Results   Component Value Date/Time    HEMOGLOBIN 13.6 10/16/2019 09:00 PM    HEMOGLOBIN 11.8 (L) 02/25/2014 04:25 PM    HEMATOCRIT 40.4 10/16/2019 09:00 PM    HEMATOCRIT 35.2 (L) 02/25/2014 04:25 PM    MCV 92 10/16/2019 09:00 PM    MCV 90.7 02/25/2014 04:25 PM    TSH 1.720 10/16/2019 09:00 PM      Lab Results   Component Value Date/Time    SODIUM 144 10/16/2019 09:00 PM    SODIUM 138 02/25/2014 04:25 PM    POTASSIUM 4.0 10/16/2019 09:00 PM    POTASSIUM 3.8 02/25/2014 04:25 PM    CHLORIDE 108 (H) 10/16/2019 09:00 PM    CHLORIDE 106 02/25/2014 04:25 PM    CO2 22 10/16/2019 09:00 PM    CO2 24 02/25/2014 04:25 PM    GLUCOSE 99 10/16/2019 09:00 PM    GLUCOSE 87 02/25/2014 04:25 PM    BUN 14 10/16/2019 09:00 PM    BUN 13 02/25/2014 04:25 PM    CREATININE 0.94 10/16/2019 09:00 PM    CREATININE 0.80 02/25/2014 04:25 PM      Lab Results   Component Value Date/Time    ASTSGOT 12 10/16/2019 09:00 PM    ASTSGOT 10 (L) 02/25/2014 04:25 PM    ALTSGPT 14 10/16/2019 09:00 PM    ALTSGPT 8 02/25/2014 04:25 PM    ALBUMIN 4.4 10/16/2019 09:00 PM    ALBUMIN 4.3 02/25/2014 04:25 PM      Lab Results   Component Value Date/Time    CHOLSTRLTOT 168 02/24/2012 09:58 AM     02/24/2012 09:58 AM    HDL 52 02/24/2012 09:58 AM    TRIGLYCERIDE 63 02/24/2012 09:58 AM       Last blood work available to me is from February 2014, creatinine was 0.8, potassium was 3.8, LFTs were normal  Last lipid panel from 2012 revealed total cholesterol 168, Trig 63, HDL 52,     Past Medical History:   Diagnosis Date   • Allergy    • Anesthesia     clautraphobic unable to tolerated mask   • Arthritis     hands and feet   • Hemorrhoids    • Indigestion    • Infectious disease 5/2014    step throat   • Migraines    • Pleurisy      Past Surgical History:   Procedure Laterality Date   • HYSTEROSCOPY NOVASURE-2  6/17/2014    Performed by [facility] Thomason M.D. at SURGERY SAME DAY [facility]    • TUBAL COAGULATION LAPAROSCOPIC BILATERAL  1998   • APPENDECTOMY     • BOWEL RESECTION     • CYSTECTOMY     • EXPLORATORY LAPAROTOMY      for surgical adhesions, polyps   • HEMORRHOIDECTOMY     • PRIMARY C SECTION       Family History   Adopted: Yes   Problem Relation Age of Onset   • Other Mother         hypothyroid   • Diabetes Mother    • Hypertension Mother    • Hyperlipidemia Mother    • Thyroid Mother    • No Known Problems Father         Adopted by father   • No Known Problems Maternal Grandmother    • No Known Problems Maternal Grandfather    • No Known Problems Sister         Half sister     Social History     Socioeconomic History   • Marital status:      Spouse name: Not on file   • Number of children: Not on file   • Years of education: Not on file   • Highest education level: Not on file   Occupational History   • Not on file   Social Needs   • Financial resource strain: Not on file   • Food insecurity     Worry: Not on file     Inability: Not on file   • Transportation needs     Medical: Not on file     Non-medical: Not on file   Tobacco Use   • Smoking status: Former Smoker     Packs/day: 0.50     Years: 6.00     Pack years: 3.00     Types: Cigarettes     Last attempt to quit: 1996     Years since quittin.2   • Smokeless tobacco: Never Used   Substance and Sexual Activity   • Alcohol use: Yes     Frequency: Monthly or less   • Drug use: No   • Sexual activity: Yes   Lifestyle   • Physical activity     Days per week: Not on file     Minutes per session: Not on file   • Stress: Not on file   Relationships   • Social connections     Talks on phone: Not on file     Gets together: Not on file     Attends Rastafari service: Not on file     Active member of club or organization: Not on file     Attends meetings of clubs or organizations: Not on file     Relationship status: Not on file   • Intimate partner violence     Fear of current or ex partner: Not on file     Emotionally  "abused: Not on file     Physically abused: Not on file     Forced sexual activity: Not on file   Other Topics Concern   • Not on file   Social History Narrative   • Not on file     Allergies   Allergen Reactions   • Codeine Vomiting and Swelling   • Other Environmental Hives     Kiwi fruit       Current Outpatient Medications   Medication Sig Dispense Refill   • albuterol 108 (90 Base) MCG/ACT Aero Soln inhalation aerosol Inhale 2 Puffs by mouth every four hours as needed (wheezing). 1 Inhaler 0   • Esomeprazole Magnesium (NEXIUM) 20 MG Pack Take  by mouth.       No current facility-administered medications for this visit.        ROS  All others systems reviewed and negative.     Objective:     Ht 1.549 m (5' 1\")   Wt 83.5 kg (184 lb)  Body mass index is 34.77 kg/m².    This encounter was conducted via Zoom .   Verbal consent was obtained. Patient's identity was verified.      Vitals obtained by patient:  None, pt does not have BP cuff, BP 3- was 132/76    Physical Exam:  General: No acute distress. Well nourished.   HEENT: EOM grossly intact, no scleral icterus, no pharyngeal erythema.   Neck:  No JVD noted at 90 degrees, trachea midline  CVS: Pulse as reported by patient, no visible LE edema.  Resp: Unlabored respiratory effort, no cough or audible wheeze  MSK/Ext: No clubbing or cyanosis visible appreciated.  Skin: No rashes in visible areas.  Neurological: CN III-XII grossly intact. No focal deficits.   Psych: A&O x 3, appropriate affect, good judgement, well groomed      Assessment:     1. Other chest pain  TROPONIN 1   2. Dyspnea on exertion  EC-ECHOCARDIOGRAM COMPLETE W/O CONT   3. Mild intermittent reactive airway disease without complication     4. Palpitations  ZIO PATCH MONITOR    EC-ECHOCARDIOGRAM COMPLETE W/O CONT   5. Gastroesophageal reflux disease without esophagitis     6. Pure hypercholesterolemia     7. Lightheaded  ZIO PATCH MONITOR    EC-ECHOCARDIOGRAM COMPLETE W/O CONT   8. Hiatal " hernia         Medical Decision Making:  Today's Assessment / Status / Plan:     -Needs zio, then trial BB, watch for worsening airway, may need CCB instead, see below.  -Blood work including troponin  -Echo  -No ASA for now given prior ulcer, has been on PPI, would do ASA if we find afib.    Written instructions given today:    -Echocardiogram- heart pictures to look at the heart structure and pump function.  -Zio Patch, heart monitor, 2 weeks, evaluate the electrical system of the heart  -Blood test: troponin, enzyme in the blood that picks up strain on the heart. Not fasting, at your convenience.    -If you are having a heart rhythm change, I will probably ask you to eventually get a sleep study to look for apnea but this will be delayed due to COVID 19.    -When we have captured the heart rhythm problem, we will want to try to suppress it with medications.  The medication is intended to help you feel better, if you feel worse for any reason stop the medication and let me know.  If we start the medication at a low dose and you do not feel better, you can go up on the medication on your own by half a tablet.    We have a few options:    1- metoprolol tartrate 12.5 mg AM and/or PM, this medication is a 12 hour medication.  If you do not feel symptom relief, you can increase the medication by half a tablet until you feel better.  It can slow down your resting heart rate but I am not worried about that.  It can lower blood pressure slightly but I am not worried about that.  The maximum dose is 100 mg twice a day.    2-Cardizem/diltiazem 120 mg once daily, this is a 24-hour medication.  You can increase on your own, maximum dose is 360 mg daily.  Side effects include mild constipation, mild ankle swelling which is cosmetic only and not worrisome.  Again, consider lower blood pressure and heart rate slightly.    Return in about 4 weeks (around 5/7/2020).    It is my pleasure to participate in the care of Ms. Johansen.   Please do not hesitate to contact me with questions or concerns.    Krystina Natino MD, [facility]  Cardiologist [facility]    Please note that this dictation was created using voice recognition software. I have made every reasonable attempt to correct obvious errors, but it is possible there are errors of grammar and possibly content that I did not discover before finalizing the note.

## 2020-04-14 ENCOUNTER — HOSPITAL ENCOUNTER (OUTPATIENT)
Dept: LAB | Facility: MEDICAL CENTER | Age: 55
End: 2020-04-14
Attending: INTERNAL MEDICINE
Payer: COMMERCIAL

## 2020-04-14 DIAGNOSIS — R07.89 OTHER CHEST PAIN: ICD-10-CM

## 2020-04-14 LAB — TROPONIN T SERPL-MCNC: 10 NG/L (ref 6–19)

## 2020-04-14 PROCEDURE — 36415 COLL VENOUS BLD VENIPUNCTURE: CPT

## 2020-04-14 PROCEDURE — 84484 ASSAY OF TROPONIN QUANT: CPT

## 2020-04-22 ENCOUNTER — TELEPHONE (OUTPATIENT)
Dept: CARDIOLOGY | Facility: MEDICAL CENTER | Age: 55
End: 2020-04-22

## 2020-04-22 ENCOUNTER — NON-PROVIDER VISIT (OUTPATIENT)
Dept: CARDIOLOGY | Facility: MEDICAL CENTER | Age: 55
End: 2020-04-22
Attending: INTERNAL MEDICINE
Payer: COMMERCIAL

## 2020-04-22 DIAGNOSIS — R00.2 PALPITATIONS: ICD-10-CM

## 2020-04-22 DIAGNOSIS — R42 LIGHTHEADED: ICD-10-CM

## 2020-04-22 DIAGNOSIS — I47.10 SVT (SUPRAVENTRICULAR TACHYCARDIA) (HCC): ICD-10-CM

## 2020-04-22 NOTE — TELEPHONE ENCOUNTER
Home enrollment completed for the 14 day Zio patch program per .   Monitor to be mailed to patient by [email or web address].   >Currently pending EOS.

## 2020-04-23 ENCOUNTER — HOSPITAL ENCOUNTER (OUTPATIENT)
Dept: CARDIOLOGY | Facility: MEDICAL CENTER | Age: 55
End: 2020-04-23
Attending: INTERNAL MEDICINE
Payer: COMMERCIAL

## 2020-04-23 DIAGNOSIS — R06.09 DYSPNEA ON EXERTION: ICD-10-CM

## 2020-04-23 DIAGNOSIS — R00.2 PALPITATIONS: ICD-10-CM

## 2020-04-23 DIAGNOSIS — R42 LIGHTHEADED: ICD-10-CM

## 2020-04-23 LAB
LV EJECT FRACT  99904: 65
LV EJECT FRACT MOD 2C 99903: 68.62
LV EJECT FRACT MOD 4C 99902: 65.35
LV EJECT FRACT MOD BP 99901: 66.56

## 2020-04-23 PROCEDURE — 93306 TTE W/DOPPLER COMPLETE: CPT

## 2020-04-23 PROCEDURE — 93306 TTE W/DOPPLER COMPLETE: CPT | Mod: 26 | Performed by: INTERNAL MEDICINE

## 2020-05-18 PROCEDURE — 0298T PR EXT ECG > 48HR TO 21 DAY REVIEW AND INTERPRETATN: CPT | Performed by: INTERNAL MEDICINE

## 2020-05-18 PROCEDURE — 0296T PR EXT ECG > 48HR TO 21 DAY RCRD W/CONECT INTL RCRD: CPT | Performed by: INTERNAL MEDICINE

## 2020-05-19 ENCOUNTER — TELEPHONE (OUTPATIENT)
Dept: CARDIOLOGY | Facility: MEDICAL CENTER | Age: 55
End: 2020-05-19

## 2020-05-19 NOTE — TELEPHONE ENCOUNTER
New afib   Received: Today   Message Contents   Krystina Nation M.D.  JASSI Roldan, needs ASA and see if we can get her on telemed with me tomorrow.   Tx   LS      Pt called. Pt will start taking an 81 mg ASA this AM and again tomorrow AM before 11 AM scheduled virtual visit and will discuss ZIO patch results with MD. Pt states understanding.

## 2020-05-19 NOTE — PROGRESS NOTES
"Subjective:   Chief Complaint:   Chief Complaint   Patient presents with   • Chest Pain   • Palpitations     This office visit is taking place via video conference in the setting of the COVID-19 pandemic.    \"Ashley\" Jessie Johansen is a 54 y.o. female who returns for atypical chest pain, VERONICA, palpitations and now PAF and SVT.    She has been waking up at night, having discomfort in lower part of left breast, notes heart is racing, sits up, takes deep breaths, happening over the past few weeks.  Was feeling very lightheaded the next day and chest felt tight, radiated to throat.  Feels like it is racing quickly, not sure if it was irregular.    Echo was normal, normal size LA.  Zio with fast SVT and afib.  EOAYB1lmli of 1.  TSH ok     She does snore,  concerned for apnea, not clear if apnea, rare daytime sleepiness.    She has an inhaler, gets reactive airway but not asthma, not prior asthma, only in [place].    Has GERD- EGD and C scope done.  Has hiatal hernia and ulcer, started PPI.    She previously had episodes of left upper chest and shoulder tightness which sometimes radiates to the upper arm. It has happened while driving when she is stressed, doing dishes and at rest. It typically resolve spontaneously after 5 minutes. She had a nuclear stress test in 2017 which was normal. She exercised on the treadmill.      She has some mild dyspnea on exertion which is intermittent and sometimes some mild shortness of breath when lying down.   She notes mild lower extremity edema particularly after exercise.     Has very mildly elevated LDL of 103.  HDL protective.  No hypertension.  Lifelong nonsmoker.  No diabetes.   She is adopted on her father's side and does not know her father's biologic history.     She has never had syncope.   She does not get dizzy or lightheaded.    Lives in [place] with her .  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  Has been walking,  at the gym.      DATA REVIEWED " by me:  ECG 3-  Sinus, 83, early R wave progression, normal variant    ECG April 25, 2018  NSR, 62, normal    ZioPatch Summary: 4-  1. Sinus rhythm with appropriate heart rate range. Average 86, range 46 to 250 bpm.   2. Normal sinus node and AV node conduction normal. No pauses.   3. Rare PACs.   4. Rare PVCs.   5. Paroxysmal atrial fibrillation, <1% burden, up to 7.5 minutes. Frequent SVT, up to 2 mins, up to 250 bpm.   6. 125 pt triggers during sinus, PAF, SVT, PVCs. Symptoms reported include racing, fluttering.     Conclusion: Sinus with frequent symptomatic fast SVT, paroxsymal atrial fibrillation.     Echo 4-  No prior study is available for comparison.   Left ventricular ejection fraction is visually estimated to be 65%.  Normal left atrial size.  No significant valve or doppler abnormality.       Nuclear stress test January 24, 2017  Exercised 6 minutes and 50 seconds achieving 7 METS, no concerning issues  Normal perfusion    Most recent labs:     Lab Results   Component Value Date/Time    HEMOGLOBIN 13.6 10/16/2019 09:00 PM    HEMOGLOBIN 11.8 (L) 02/25/2014 04:25 PM    HEMATOCRIT 40.4 10/16/2019 09:00 PM    HEMATOCRIT 35.2 (L) 02/25/2014 04:25 PM    MCV 92 10/16/2019 09:00 PM    MCV 90.7 02/25/2014 04:25 PM    TSH 1.720 10/16/2019 09:00 PM      Lab Results   Component Value Date/Time    SODIUM 144 10/16/2019 09:00 PM    SODIUM 138 02/25/2014 04:25 PM    POTASSIUM 4.0 10/16/2019 09:00 PM    POTASSIUM 3.8 02/25/2014 04:25 PM    CHLORIDE 108 (H) 10/16/2019 09:00 PM    CHLORIDE 106 02/25/2014 04:25 PM    CO2 22 10/16/2019 09:00 PM    CO2 24 02/25/2014 04:25 PM    GLUCOSE 99 10/16/2019 09:00 PM    GLUCOSE 87 02/25/2014 04:25 PM    BUN 14 10/16/2019 09:00 PM    BUN 13 02/25/2014 04:25 PM    CREATININE 0.94 10/16/2019 09:00 PM    CREATININE 0.80 02/25/2014 04:25 PM      Lab Results   Component Value Date/Time    ASTSGOT 12 10/16/2019 09:00 PM    ASTSGOT 10 (L) 02/25/2014 04:25 PM    DAVIDPT  14 10/16/2019 09:00 PM    ALTSGPT 8 02/25/2014 04:25 PM    ALBUMIN 4.4 10/16/2019 09:00 PM    ALBUMIN 4.3 02/25/2014 04:25 PM      Lab Results   Component Value Date/Time    CHOLSTRLTOT 168 02/24/2012 09:58 AM     02/24/2012 09:58 AM    HDL 52 02/24/2012 09:58 AM    TRIGLYCERIDE 63 02/24/2012 09:58 AM       Last blood work available to me is from February 2014, creatinine was 0.8, potassium was 3.8, LFTs were normal  Last lipid panel from 2012 revealed total cholesterol 168, Trig 63, HDL 52,     Past Medical History:   Diagnosis Date   • Allergy    • Anesthesia     clautraphobic unable to tolerated mask   • Arthritis     hands and feet   • Hemorrhoids    • Indigestion    • Infectious disease 5/2014    step throat   • Migraines    • Pleurisy      Past Surgical History:   Procedure Laterality Date   • HYSTEROSCOPY NOVASURE-2  6/17/2014    Performed by [facility]ason M.D. at SURGERY SAME DAY [facility]   • TUBAL COAGULATION LAPAROSCOPIC BILATERAL  1998 1998   • APPENDECTOMY     • BOWEL RESECTION     • CYSTECTOMY     • EXPLORATORY LAPAROTOMY      for surgical adhesions, polyps   • HEMORRHOIDECTOMY     • PRIMARY C SECTION       Family History   Adopted: Yes   Problem Relation Age of Onset   • Other Mother         hypothyroid   • Diabetes Mother    • Hypertension Mother    • Hyperlipidemia Mother    • Thyroid Mother    • No Known Problems Father         Adopted by father   • No Known Problems Maternal Grandmother    • No Known Problems Maternal Grandfather    • No Known Problems Sister         Half sister     Social History     Socioeconomic History   • Marital status:      Spouse name: Not on file   • Number of children: Not on file   • Years of education: Not on file   • Highest education level: Not on file   Occupational History   • Not on file   Social Needs   • Financial resource strain: Not on file   • Food insecurity     Worry: Not on file     Inability: Not on file   • Transportation  "needs     Medical: Not on file     Non-medical: Not on file   Tobacco Use   • Smoking status: Former Smoker     Packs/day: 0.50     Years: 6.00     Pack years: 3.00     Types: Cigarettes     Last attempt to quit: 1996     Years since quittin.4   • Smokeless tobacco: Never Used   Substance and Sexual Activity   • Alcohol use: Yes     Frequency: Monthly or less   • Drug use: No   • Sexual activity: Yes   Lifestyle   • Physical activity     Days per week: Not on file     Minutes per session: Not on file   • Stress: Not on file   Relationships   • Social connections     Talks on phone: Not on file     Gets together: Not on file     Attends [employer] service: Not on file     Active member of club or organization: Not on file     Attends meetings of clubs or organizations: Not on file     Relationship status: Not on file   • Intimate partner violence     Fear of current or ex partner: Not on file     Emotionally abused: Not on file     Physically abused: Not on file     Forced sexual activity: Not on file   Other Topics Concern   • Not on file   Social History Narrative   • Not on file     Allergies   Allergen Reactions   • Codeine Vomiting and Swelling   • Other Environmental Hives     Kiwi fruit       Current Outpatient Medications   Medication Sig Dispense Refill   • metoprolol (LOPRESSOR) 25 MG Tab Take 1 Tab by mouth 2 times a day. 180 Tab 3   • albuterol 108 (90 Base) MCG/ACT Aero Soln inhalation aerosol Inhale 2 Puffs by mouth every four hours as needed (wheezing). 1 Inhaler 0   • Esomeprazole Magnesium (NEXIUM) 20 MG Pack Take  by mouth.       No current facility-administered medications for this visit.        ROS  All others systems reviewed and negative.     Objective:     Ht 1.549 m (5' 1\")   Wt 83.5 kg (184 lb)  Body mass index is 34.77 kg/m².    This encounter was conducted via Zoom.  Verbal consent was obtained. Patient's identity was verified.    Vitals obtained by patient:    Physical " Exam:  General: No acute distress. Well nourished.   HEENT: EOM grossly intact, no scleral icterus, no pharyngeal erythema.   Neck:  No JVD noted at 90 degrees, trachea midline  CVS: Pulse as reported by patient, no visible LE edema.  Resp: Unlabored respiratory effort, no cough or audible wheeze  MSK/Ext: No clubbing or cyanosis visible appreciated.  Skin: No rashes in visible areas.  Neurological: CN III-XII grossly intact. No focal deficits.   Psych: A&O x 3, appropriate affect, good judgement, well groomed        Assessment:     1. Paroxysmal atrial fibrillation (HCC)  REFERRAL TO SLEEP STUDIES   2. SVT (supraventricular tachycardia) (HCC)  REFERRAL TO SLEEP STUDIES   3. Palpitations     4. Lightheaded     5. Other chest pain     6. Mild intermittent reactive airway disease without complication     7. Gastroesophageal reflux disease without esophagitis     8. Pure hypercholesterolemia     9. Hiatal hernia     10. LINA (obstructive sleep apnea)  REFERRAL TO SLEEP STUDIES       Medical Decision Making:  Today's Assessment / Status / Plan:     -New symptomatic PAF and SVT  -Has stomach ulcer, on ASA now, has been on nexium, may need to consider referral back to GI or alternative. Stay on PPI.  -CHADS2 vasc is 1, no prior TIA/CVA.  -Start BB, consider ablation  -Needs close FU  -See below for further instructions    Written instructions given today:    -Your heart monitor is showing fast SVT= supraventricular tachycardia and paroxysmal atrial fibrillation.    -YNIPD1ioep is 1, aspirin only, when your score is 3, we will talk about blood thinner.    We have a few options:    1- Metoprolol Tartrate 12.5 mg AM and/or PM (1/2 tablet AM and PM), this medication is a 12 hour medication.  If you do not feel symptom relief, you can increase the medication by half a tablet until you feel better.  Next would be 25 mg AM and PM (1 hole pill), then 1.5 tablets AM and PM.  Can increase the med every 2 days.  It can slow down your  resting heart rate but I am not worried about that.  It can lower blood pressure slightly but I am not worried about that.  The maximum dose is 100 mg twice a day.    2-Cardizem/diltiazem 120 mg once daily, this is a 24-hour medication.  You can increase on your own, maximum dose is 360 mg daily.  Side effects include mild constipation, mild ankle swelling which is cosmetic only and not worrisome.  Again, consider lower blood pressure and heart rate slightly.    -Referral to evaluate for sleep apnea, if you do not hear about the referral within 1 week contact the office.    -If your palpitations are not better with medication you can be referred to an electrophysiologist cardiologist to discuss a procedure called ablation.    -You do not have restrictions except based on your own symptoms.    -[email or web address]  -Things that help, weight loss, avoid alcohol, avoid caffeine until you know how it affects you    Return in about 4 weeks (around 6/17/2020).    It is my pleasure to participate in the care of Ms. Johansen.  Please do not hesitate to contact me with questions or concerns.    Krystina Nation MD, [facility]  Cardiologist [facility]    Please note that this dictation was created using voice recognition software. I have made every reasonable attempt to correct obvious errors, but it is possible there are errors of grammar and possibly content that I did not discover before finalizing the note.

## 2020-05-20 ENCOUNTER — TELEMEDICINE (OUTPATIENT)
Dept: CARDIOLOGY | Facility: MEDICAL CENTER | Age: 55
End: 2020-05-20
Payer: COMMERCIAL

## 2020-05-20 VITALS — HEIGHT: 61 IN | WEIGHT: 184 LBS | BODY MASS INDEX: 34.74 KG/M2

## 2020-05-20 DIAGNOSIS — K44.9 HIATAL HERNIA: ICD-10-CM

## 2020-05-20 DIAGNOSIS — J45.20 MILD INTERMITTENT REACTIVE AIRWAY DISEASE WITHOUT COMPLICATION: ICD-10-CM

## 2020-05-20 DIAGNOSIS — I47.10 SVT (SUPRAVENTRICULAR TACHYCARDIA) (HCC): ICD-10-CM

## 2020-05-20 DIAGNOSIS — E78.00 PURE HYPERCHOLESTEROLEMIA: ICD-10-CM

## 2020-05-20 DIAGNOSIS — K21.9 GASTROESOPHAGEAL REFLUX DISEASE WITHOUT ESOPHAGITIS: ICD-10-CM

## 2020-05-20 DIAGNOSIS — G47.33 OSA (OBSTRUCTIVE SLEEP APNEA): ICD-10-CM

## 2020-05-20 DIAGNOSIS — R07.89 OTHER CHEST PAIN: ICD-10-CM

## 2020-05-20 DIAGNOSIS — R42 LIGHTHEADED: ICD-10-CM

## 2020-05-20 DIAGNOSIS — I48.0 PAROXYSMAL ATRIAL FIBRILLATION (HCC): ICD-10-CM

## 2020-05-20 DIAGNOSIS — R00.2 PALPITATIONS: ICD-10-CM

## 2020-05-20 PROCEDURE — 99214 OFFICE O/P EST MOD 30 MIN: CPT | Mod: 95,CR | Performed by: INTERNAL MEDICINE

## 2020-05-20 ASSESSMENT — FIBROSIS 4 INDEX: FIB4 SCORE: 0.6

## 2020-05-20 NOTE — LETTER
"     [facility]   [address]   [address]  [place] [address]  Phone: [phone]  Fax: [phone]              Ashley Johansen  1965    Encounter Date: 5/20/2020    Krystina Nation M.D.          PROGRESS NOTE:  Subjective:   Chief Complaint:   Chief Complaint   Patient presents with   • Chest Pain   • Palpitations     This office visit is taking place via video conference in the setting of the COVID-19 pandemic.    \"Ashley\" Jessie Johansen is a 54 y.o. female who returns for atypical chest pain, VERONICA, palpitations and now PAF and SVT.    She has been waking up at night, having discomfort in lower part of left breast, notes heart is racing, sits up, takes deep breaths, happening over the past few weeks.  Was feeling very lightheaded the next day and chest felt tight, radiated to throat.  Feels like it is racing quickly, not sure if it was irregular.    Echo was normal, normal size LA.  Zio with fast SVT and afib.  [id number] of 1.  TSH ok     She does snore,  concerned for apnea, not clear if apnea, rare daytime sleepiness.    She has an inhaler, gets reactive airway but not asthma, not prior asthma, only in [place].    Has GERD- EGD and C scope done.  Has hiatal hernia and ulcer, started PPI.    She previously had episodes of left upper chest and shoulder tightness which sometimes radiates to the upper arm. It has happened while driving when she is stressed, doing dishes and at rest. It typically resolve spontaneously after 5 minutes. She had a nuclear stress test in 2017 which was normal. She exercised on the treadmill.      She has some mild dyspnea on exertion which is intermittent and sometimes some mild shortness of breath when lying down.   She notes mild lower extremity edema particularly after exercise.     Has very mildly elevated LDL of 103.  HDL protective.  No hypertension.  Lifelong nonsmoker.  No diabetes.   She is " adopted on her father's side and does not know her father's biologic history.     She has never had syncope.   She does not get dizzy or lightheaded.    Lives in [place] with her .  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  Has been walking,  at the gym.      DATA REVIEWED by me:  ECG 3-  Sinus, 83, early R wave progression, normal variant    ECG April 25, 2018  NSR, 62, normal    ZioPatch Summary: 4-  1. Sinus rhythm with appropriate heart rate range. Average 86, range 46 to 250 bpm.   2. Normal sinus node and AV node conduction normal. No pauses.   3. Rare PACs.   4. Rare PVCs.   5. Paroxysmal atrial fibrillation, <1% burden, up to 7.5 minutes. Frequent SVT, up to 2 mins, up to 250 bpm.   6. 125 pt triggers during sinus, PAF, SVT, PVCs. Symptoms reported include racing, fluttering.     Conclusion: Sinus with frequent symptomatic fast SVT, paroxsymal atrial fibrillation.     Echo 4-  No prior study is available for comparison.   Left ventricular ejection fraction is visually estimated to be 65%.  Normal left atrial size.  No significant valve or doppler abnormality.       Nuclear stress test January 24, 2017  Exercised 6 minutes and 50 seconds achieving 7 METS, no concerning issues  Normal perfusion    Most recent labs:     Lab Results   Component Value Date/Time    HEMOGLOBIN 13.6 10/16/2019 09:00 PM    HEMOGLOBIN 11.8 (L) 02/25/2014 04:25 PM    HEMATOCRIT 40.4 10/16/2019 09:00 PM    HEMATOCRIT 35.2 (L) 02/25/2014 04:25 PM    MCV 92 10/16/2019 09:00 PM    MCV 90.7 02/25/2014 04:25 PM    TSH 1.720 10/16/2019 09:00 PM      Lab Results   Component Value Date/Time    SODIUM 144 10/16/2019 09:00 PM    SODIUM 138 02/25/2014 04:25 PM    POTASSIUM 4.0 10/16/2019 09:00 PM    POTASSIUM 3.8 02/25/2014 04:25 PM    CHLORIDE 108 (H) 10/16/2019 09:00 PM    CHLORIDE 106 02/25/2014 04:25 PM    CO2 22 10/16/2019 09:00 PM    CO2 24 02/25/2014 04:25 PM    GLUCOSE 99 10/16/2019 09:00 PM    GLUCOSE  87 02/25/2014 04:25 PM    BUN 14 10/16/2019 09:00 PM    BUN 13 02/25/2014 04:25 PM    CREATININE 0.94 10/16/2019 09:00 PM    CREATININE 0.80 02/25/2014 04:25 PM      Lab Results   Component Value Date/Time    ASTSGOT 12 10/16/2019 09:00 PM    ASTSGOT 10 (L) 02/25/2014 04:25 PM    ALTSGPT 14 10/16/2019 09:00 PM    ALTSGPT 8 02/25/2014 04:25 PM    ALBUMIN 4.4 10/16/2019 09:00 PM    ALBUMIN 4.3 02/25/2014 04:25 PM      Lab Results   Component Value Date/Time    CHOLSTRLTOT 168 02/24/2012 09:58 AM     02/24/2012 09:58 AM    HDL 52 02/24/2012 09:58 AM    TRIGLYCERIDE 63 02/24/2012 09:58 AM       Last blood work available to me is from February 2014, creatinine was 0.8, potassium was 3.8, LFTs were normal  Last lipid panel from 2012 revealed total cholesterol 168, Trig 63, HDL 52,     Past Medical History:   Diagnosis Date   • Allergy    • Anesthesia     clautraphobic unable to tolerated mask   • Arthritis     hands and feet   • Hemorrhoids    • Indigestion    • Infectious disease 5/2014    step throat   • Migraines    • Pleurisy      Past Surgical History:   Procedure Laterality Date   • HYSTEROSCOPY NOVASURE-2  6/17/2014    Performed by [facility] M.D. at SURGERY SAME DAY [facility] ORS   • TUBAL COAGULATION LAPAROSCOPIC BILATERAL  1998 1998   • APPENDECTOMY     • BOWEL RESECTION     • CYSTECTOMY     • EXPLORATORY LAPAROTOMY      for surgical adhesions, polyps   • HEMORRHOIDECTOMY     • PRIMARY C SECTION       Family History   Adopted: Yes   Problem Relation Age of Onset   • Other Mother         hypothyroid   • Diabetes Mother    • Hypertension Mother    • Hyperlipidemia Mother    • Thyroid Mother    • No Known Problems Father         Adopted by father   • No Known Problems Maternal Grandmother    • No Known Problems Maternal Grandfather    • No Known Problems Sister         Half sister     Social History     Socioeconomic History   • Marital status:      Spouse name: Not on file   • Number  of children: Not on file   • Years of education: Not on file   • Highest education level: Not on file   Occupational History   • Not on file   Social Needs   • Financial resource strain: Not on file   • Food insecurity     Worry: Not on file     Inability: Not on file   • Transportation needs     Medical: Not on file     Non-medical: Not on file   Tobacco Use   • Smoking status: Former Smoker     Packs/day: 0.50     Years: 6.00     Pack years: 3.00     Types: Cigarettes     Last attempt to quit: 1996     Years since quittin.4   • Smokeless tobacco: Never Used   Substance and Sexual Activity   • Alcohol use: Yes     Frequency: Monthly or less   • Drug use: No   • Sexual activity: Yes   Lifestyle   • Physical activity     Days per week: Not on file     Minutes per session: Not on file   • Stress: Not on file   Relationships   • Social connections     Talks on phone: Not on file     Gets together: Not on file     Attends Jew service: Not on file     Active member of club or organization: Not on file     Attends meetings of clubs or organizations: Not on file     Relationship status: Not on file   • Intimate partner violence     Fear of current or ex partner: Not on file     Emotionally abused: Not on file     Physically abused: Not on file     Forced sexual activity: Not on file   Other Topics Concern   • Not on file   Social History Narrative   • Not on file     Allergies   Allergen Reactions   • Codeine Vomiting and Swelling   • Other Environmental Hives     Kiwi fruit       Current Outpatient Medications   Medication Sig Dispense Refill   • metoprolol (LOPRESSOR) 25 MG Tab Take 1 Tab by mouth 2 times a day. 180 Tab 3   • albuterol 108 (90 Base) MCG/ACT Aero Soln inhalation aerosol Inhale 2 Puffs by mouth every four hours as needed (wheezing). 1 Inhaler 0   • Esomeprazole Magnesium (NEXIUM) 20 MG Pack Take  by mouth.       No current facility-administered medications for this visit.        ROS  All  "others systems reviewed and negative.     Objective:     Ht 1.549 m (5' 1\")   Wt 83.5 kg (184 lb)  Body mass index is 34.77 kg/m².    This encounter was conducted via Zoom.  Verbal consent was obtained. Patient's identity was verified.    Vitals obtained by patient:    Physical Exam:  General: No acute distress. Well nourished.   HEENT: EOM grossly intact, no scleral icterus, no pharyngeal erythema.   Neck:  No JVD noted at 90 degrees, trachea midline  CVS: Pulse as reported by patient, no visible LE edema.  Resp: Unlabored respiratory effort, no cough or audible wheeze  MSK/Ext: No clubbing or cyanosis visible appreciated.  Skin: No rashes in visible areas.  Neurological: CN III-XII grossly intact. No focal deficits.   Psych: A&O x 3, appropriate affect, good judgement, well groomed        Assessment:     1. Paroxysmal atrial fibrillation (HCC)  REFERRAL TO SLEEP STUDIES   2. SVT (supraventricular tachycardia) (HCC)  REFERRAL TO SLEEP STUDIES   3. Palpitations     4. Lightheaded     5. Other chest pain     6. Mild intermittent reactive airway disease without complication     7. Gastroesophageal reflux disease without esophagitis     8. Pure hypercholesterolemia     9. Hiatal hernia     10. LINA (obstructive sleep apnea)  REFERRAL TO SLEEP STUDIES       Medical Decision Making:  Today's Assessment / Status / Plan:     -New symptomatic PAF and SVT  -Has stomach ulcer, on ASA now, has been on nexium, may need to consider referral back to GI or alternative. Stay on PPI.  -CHADS2 vasc is 1, no prior TIA/CVA.  -Start BB, consider ablation  -Needs close FU  -See below for further instructions    Written instructions given today:    -Your heart monitor is showing fast SVT= supraventricular tachycardia and paroxysmal atrial fibrillation.    -[id number] is 1, aspirin only, when your score is 3, we will talk about blood thinner.    We have a few options:    1- Metoprolol Tartrate 12.5 mg AM and/or PM (1/2 tablet AM and PM), " this medication is a 12 hour medication.  If you do not feel symptom relief, you can increase the medication by half a tablet until you feel better.  Next would be 25 mg AM and PM (1 hole pill), then 1.5 tablets AM and PM.  Can increase the med every 2 days.  It can slow down your resting heart rate but I am not worried about that.  It can lower blood pressure slightly but I am not worried about that.  The maximum dose is 100 mg twice a day.    2-Cardizem/diltiazem 120 mg once daily, this is a 24-hour medication.  You can increase on your own, maximum dose is 360 mg daily.  Side effects include mild constipation, mild ankle swelling which is cosmetic only and not worrisome.  Again, consider lower blood pressure and heart rate slightly.    -Referral to evaluate for sleep apnea, if you do not hear about the referral within 1 week contact the office.    -If your palpitations are not better with medication you can be referred to an electrophysiologist cardiologist to discuss a procedure called ablation.    -You do not have restrictions except based on your own symptoms.    -[email or web address]  -Things that help, weight loss, avoid alcohol, avoid caffeine until you know how it affects you    Return in about 4 weeks (around 6/17/2020).    It is my pleasure to participate in the care of Ms. Johansen.  Please do not hesitate to contact me with questions or concerns.    Krystina Nation MD, [facility]  Cardiologist [facility]    Please note that this dictation was created using voice recognition software. I have made every reasonable attempt to correct obvious errors, but it is possible there are errors of grammar and possibly content that I did not discover before finalizing the note.      Gilda Richards, A.P.R.N.  [address]  [address]  [facility] [address]  VIA In Basket

## 2020-05-20 NOTE — PATIENT INSTRUCTIONS
-Your heart monitor is showing fast SVT= supraventricular tachycardia and paroxysmal atrial fibrillation.    -[id number] is 1, aspirin only, when your score is 3, we will talk about blood thinner.    We have a few options:    1- Metoprolol Tartrate 12.5 mg AM and/or PM (1/2 tablet AM and PM), this medication is a 12 hour medication.  If you do not feel symptom relief, you can increase the medication by half a tablet until you feel better.  Next would be 25 mg AM and PM (1 hole pill), then 1.5 tablets AM and PM.  Can increase the med every 2 days.  It can slow down your resting heart rate but I am not worried about that.  It can lower blood pressure slightly but I am not worried about that.  The maximum dose is 100 mg twice a day.    2-Cardizem/diltiazem 120 mg once daily, this is a 24-hour medication.  You can increase on your own, maximum dose is 360 mg daily.  Side effects include mild constipation, mild ankle swelling which is cosmetic only and not worrisome.  Again, consider lower blood pressure and heart rate slightly.    -Referral to evaluate for sleep apnea, if you do not hear about the referral within 1 week contact the office.    -If your palpitations are not better with medication you can be referred to an electrophysiologist cardiologist to discuss a procedure called ablation.    -You do not have restrictions except based on your own symptoms.    -[email or web address]  -Things that help, weight loss, avoid alcohol, avoid caffeine until you know how it affects you

## 2020-06-24 ENCOUNTER — TELEMEDICINE (OUTPATIENT)
Dept: CARDIOLOGY | Facility: MEDICAL CENTER | Age: 55
End: 2020-06-24
Payer: COMMERCIAL

## 2020-06-24 VITALS
BODY MASS INDEX: 34.74 KG/M2 | SYSTOLIC BLOOD PRESSURE: 122 MMHG | WEIGHT: 184 LBS | DIASTOLIC BLOOD PRESSURE: 73 MMHG | HEIGHT: 61 IN | HEART RATE: 68 BPM

## 2020-06-24 DIAGNOSIS — R00.2 PALPITATIONS: ICD-10-CM

## 2020-06-24 DIAGNOSIS — Z79.899 HIGH RISK MEDICATION USE: ICD-10-CM

## 2020-06-24 DIAGNOSIS — R07.89 OTHER CHEST PAIN: ICD-10-CM

## 2020-06-24 DIAGNOSIS — I48.0 PAF (PAROXYSMAL ATRIAL FIBRILLATION) (HCC): ICD-10-CM

## 2020-06-24 DIAGNOSIS — R06.09 DYSPNEA ON EXERTION: ICD-10-CM

## 2020-06-24 PROCEDURE — 99214 OFFICE O/P EST MOD 30 MIN: CPT | Mod: 95,CR | Performed by: NURSE PRACTITIONER

## 2020-06-24 RX ORDER — FUROSEMIDE 20 MG/1
20 TABLET ORAL DAILY
Qty: 30 TAB | Refills: 2 | Status: SHIPPED | OUTPATIENT
Start: 2020-06-24 | End: 2021-04-05

## 2020-06-24 RX ORDER — SUMATRIPTAN 100 MG/1
100 TABLET, FILM COATED ORAL
COMMUNITY
End: 2021-11-23 | Stop reason: SDUPTHER

## 2020-06-24 RX ORDER — POTASSIUM CHLORIDE 600 MG/1
8 TABLET, FILM COATED, EXTENDED RELEASE ORAL DAILY
Qty: 30 TAB | Refills: 2 | Status: SHIPPED | OUTPATIENT
Start: 2020-06-24 | End: 2021-04-05

## 2020-06-24 ASSESSMENT — FIBROSIS 4 INDEX: FIB4 SCORE: 0.6

## 2020-06-24 NOTE — PROGRESS NOTES
Telemedicine Visit: Established Patient     This encounter was conducted via Zoom .   Verbal consent was obtained. Patient's identity was verified.    Subjective:   CC: PAF, SVT, VERONICA, papitations  Ashley Johansen is a 54 y.o. female returns for atypical chest pain, VERONICA, palpitations, PAF, and SVT.  Last seen by Dr. Nation through Zoom 5/20/2020 6/24/2020: Patient is currently on metoprolol 25 mg daily.  She reports that the palpitations are mostly gone.  She has occasional breakthrough palpitations but they are not bothersome.  She is aware that she can increase to 1.5 tablets twice daily of metoprolol if needed.  Patient reports having some shortness of breath, fatigue, and lower extremity edema.  She is found that has been difficult to put on shoes at various times.  It typically goes away on its own.  Have provided furosemide with KCl 8 mEq to use as needed for lower extremity edema.  Obtain BMP in 1 week.    Patient reports having some left anterior chest heaviness.  It happens at rest and with exertion she is unaware of alleviating or aggravating factors.  It can happen in her sleep.  Will consider treadmill in the future.    She will see Dr. Hernadez in September for sleep evaluation.    5/20/2020: She has been waking up at night, having discomfort in lower part of left breast, notes heart is racing, sits up, takes deep breaths, happening over the past few weeks.  Was feeling very lightheaded the next day and chest felt tight, radiated to throat.  Feels like it is racing quickly, not sure if it was irregular.     Echo was normal, normal size LA.  Zio with fast SVT and afib.  [id number] score 1.  TSH ok      She does snore,  concerned for apnea, not clear if apnea, rare daytime sleepiness.     She has an inhaler, gets reactive airway but not asthma, not prior asthma, only in [place].     Has GERD- EGD and C scope done.  Has hiatal hernia and ulcer, started PPI.     She previously had  episodes of left upper chest and shoulder tightness which sometimes radiates to the upper arm. It has happened while driving when she is stressed, doing dishes and at rest. It typically resolve spontaneously after 5 minutes. She had a nuclear stress test in 2017 which was normal. She exercised on the treadmill.       She has some mild dyspnea on exertion which is intermittent and sometimes some mild shortness of breath when lying down.   She notes mild lower extremity edema particularly after exercise.      Has very mildly elevated LDL of 103.  HDL protective.  No hypertension.  Lifelong nonsmoker.  No diabetes.   She is adopted on her father's side and does not know her father's biologic history.      She has never had syncope.   She does not get dizzy or lightheaded.     Lives in [place] with her .  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  Has been walking,  at the gym.    ROS   Denies any recent fevers or chills. No nausea or vomiting. No chest pains or shortness of breath.     Allergies   Allergen Reactions   • Codeine Vomiting and Swelling   • Other Environmental Hives     Kiwi fruit       Current medicines (including changes today)  Current Outpatient Medications   Medication Sig Dispense Refill   • sumatriptan (IMITREX) 100 MG tablet Take 100 mg by mouth Once PRN for Migraine.     • furosemide (LASIX) 20 MG Tab Take 1 Tab by mouth every day. 30 Tab 2   • potassium chloride (KLOR-CON) 8 MEQ tablet Take 1 Tab by mouth every day. 30 Tab 2   • metoprolol (LOPRESSOR) 25 MG Tab Take 1 Tab by mouth 2 times a day. 180 Tab 3   • albuterol 108 (90 Base) MCG/ACT Aero Soln inhalation aerosol Inhale 2 Puffs by mouth every four hours as needed (wheezing). 1 Inhaler 0   • Esomeprazole Magnesium (NEXIUM) 20 MG Pack Take  by mouth.       No current facility-administered medications for this visit.        Patient Active Problem List    Diagnosis Date Noted   • PAF (paroxysmal atrial fibrillation) ([facility])  "06/24/2020   • Sinus pain 11/11/2019   • Chronic gastric ulcer without hemorrhage and without perforation 10/07/2019   • Hiatal hernia 10/07/2019   • Family history of diabetes mellitus (DM) 10/07/2019   • Intractable migraine without aura and without status migrainosus 10/07/2019   • Chronic right hip pain 10/07/2019   • Chronic midline low back pain with right-sided sciatica 10/07/2019   • Obesity (BMI 30-39.9) 10/07/2019   • Sensation of fullness in right ear 10/07/2019   • Other chest pain 04/25/2018   • Gastroesophageal reflux disease without esophagitis 04/25/2018   • Reactive airway disease 04/25/2018   • Palpitations 04/25/2018   • Dyspnea on exertion 04/25/2018   • Excessive or frequent menstruation 06/17/2014   • Irregular periods 04/13/2012   • Weight gain 04/13/2012   • Vitamin D deficiency 04/13/2012       Family History   Adopted: Yes   Problem Relation Age of Onset   • Other Mother         hypothyroid   • Diabetes Mother    • Hypertension Mother    • Hyperlipidemia Mother    • Thyroid Mother    • No Known Problems Father         Adopted by father   • No Known Problems Maternal Grandmother    • No Known Problems Maternal Grandfather    • No Known Problems Sister         Half sister       She  has a past medical history of Allergy, Anesthesia, Arthritis, Hemorrhoids, Indigestion, Infectious disease (5/2014), Migraines, PAF (paroxysmal atrial fibrillation) ([facility]) (6/24/2020), and Pleurisy.  She  has a past surgical history that includes cystectomy; hemorrhoidectomy; exploratory laparotomy; appendectomy; bowel resection; tubal coagulation laparoscopic bilateral (1998); primary c section; and hysteroscopy novasure-2 (6/17/2014).       Objective:   /73 (BP Location: Left arm, Patient Position: Sitting, BP Cuff Size: Adult)   Pulse 68   Ht 1.549 m (5' 1\")   Wt 83.5 kg (184 lb)   BMI 34.77 kg/m²     Physical Exam:  Constitutional: Alert, no distress, well-groomed.  Skin: No rashes in visible " areas.  Eye: Round. Conjunctiva clear, lids normal. No icterus.   ENMT: Lips pink without lesions, good dentition, moist mucous membranes. Phonation normal.  Neck: No masses, no thyromegaly. Moves freely without pain.  CV: Pulse as reported by patient  Respiratory: Unlabored respiratory effort, no cough or audible wheeze  Psych: Alert and oriented x3, normal affect and mood.     ECG 3-  Sinus, 83, early R wave progression, normal variant     ECG April 25, 2018  NSR, 62, normal     ZioPatch Summary: 4-  1. Sinus rhythm with appropriate heart rate range. Average 86, range 46 to 250 bpm.   2. Normal sinus node and AV node conduction normal. No pauses.   3. Rare PACs.   4. Rare PVCs.   5. Paroxysmal atrial fibrillation, <1% burden, up to 7.5 minutes. Frequent SVT, up to 2 mins, up to 250 bpm.   6. 125 pt triggers during sinus, PAF, SVT, PVCs. Symptoms reported include racing, fluttering.   Conclusion: Sinus with frequent symptomatic fast SVT, paroxsymal atrial fibrillation.      Echo 4-  No prior study is available for comparison.   Left ventricular ejection fraction is visually estimated to be 65%.  Normal left atrial size.  No significant valve or doppler abnormality.     Nuclear stress test January 24, 2017  Exercised 6 minutes and 50 seconds achieving 7 METS, no concerning issues  Normal perfusion    Assessment and Plan:   The following treatment plan was discussed:     1. Palpitations    2. Dyspnea on exertion    3. Other chest pain    4. PAF (paroxysmal atrial fibrillation) ([facility])    5. High risk medication use  - Basic Metabolic Panel; Future    Other orders  - sumatriptan (IMITREX) 100 MG tablet; Take 100 mg by mouth Once PRN for Migraine.  - furosemide (LASIX) 20 MG Tab; Take 1 Tab by mouth every day.  Dispense: 30 Tab; Refill: 2  - potassium chloride (KLOR-CON) 8 MEQ tablet; Take 1 Tab by mouth every day.  Dispense: 30 Tab; Refill: 2      1.  Currently on metoprolol 25 mg (1 tab) twice  daily, can increase to 1.5 tabs in the a.m. and p.m.  2.  Patient having lower extremity swelling, start furosemide 20 mg daily as needed WITH KCl 8 mEq daily as needed.  Patient is aware they do not have to take this on a daily basis but only when they have significant lower extremity edema.  Echocardiogram indicates normal LA, EF, will monitor.  BMP in 7-14 days.  3.  Monitor for chest heaviness.  Currently atypical in nature.  Consider treadmill test in the future      Follow-up: Return in about 6 months (around 12/24/2020) for With BRENNAN, DB in 3 months.

## 2020-06-24 NOTE — PATIENT INSTRUCTIONS
1.  Currently on metoprolol 25 mg (1 tab) twice daily, can increase to 1.5 tabs in the a.m. and p.m.  2.  Patient having lower extremity swelling, start furosemide 20 mg daily as needed WITH KCl 8 mEq daily as needed.  Patient is aware they do not have to take this on a daily basis but only when they have significant lower extremity edema.

## 2020-06-27 ENCOUNTER — HOSPITAL ENCOUNTER (OUTPATIENT)
Dept: RADIOLOGY | Facility: MEDICAL CENTER | Age: 55
End: 2020-06-27
Attending: NURSE PRACTITIONER
Payer: COMMERCIAL

## 2020-06-27 DIAGNOSIS — Z12.31 VISIT FOR SCREENING MAMMOGRAM: ICD-10-CM

## 2020-06-27 PROCEDURE — 77067 SCR MAMMO BI INCL CAD: CPT | Mod: 50

## 2020-09-08 ENCOUNTER — HOSPITAL ENCOUNTER (OUTPATIENT)
Dept: LAB | Facility: MEDICAL CENTER | Age: 55
End: 2020-09-08
Attending: NURSE PRACTITIONER
Payer: COMMERCIAL

## 2020-09-08 PROCEDURE — 83013 H PYLORI (C-13) BREATH: CPT

## 2020-09-10 LAB — UREA BREATH TEST QL: NEGATIVE

## 2020-09-11 ENCOUNTER — TELEMEDICINE (OUTPATIENT)
Dept: SLEEP MEDICINE | Facility: MEDICAL CENTER | Age: 55
End: 2020-09-11
Payer: COMMERCIAL

## 2020-09-11 VITALS — BODY MASS INDEX: 34.74 KG/M2 | HEIGHT: 61 IN | WEIGHT: 184 LBS

## 2020-09-11 DIAGNOSIS — I48.0 PAF (PAROXYSMAL ATRIAL FIBRILLATION) (HCC): ICD-10-CM

## 2020-09-11 DIAGNOSIS — G47.30 SLEEP APNEA, UNSPECIFIED TYPE: ICD-10-CM

## 2020-09-11 DIAGNOSIS — Z87.891 FORMER SMOKER: ICD-10-CM

## 2020-09-11 DIAGNOSIS — J45.20 MILD INTERMITTENT REACTIVE AIRWAY DISEASE WITHOUT COMPLICATION: ICD-10-CM

## 2020-09-11 DIAGNOSIS — G47.10 HYPERSOMNOLENCE: ICD-10-CM

## 2020-09-11 DIAGNOSIS — R06.83 SNORING: ICD-10-CM

## 2020-09-11 PROCEDURE — 99204 OFFICE O/P NEW MOD 45 MIN: CPT | Performed by: INTERNAL MEDICINE

## 2020-09-11 RX ORDER — LANSOPRAZOLE 30 MG/1
CAPSULE, DELAYED RELEASE ORAL
COMMUNITY
Start: 2020-09-02 | End: 2021-07-28

## 2020-09-11 ASSESSMENT — FIBROSIS 4 INDEX: FIB4 SCORE: 0.6

## 2020-09-11 NOTE — PROGRESS NOTES
CC: Snoring, nocturnal apnea and excessive daytime somnolence, suspected sleep apnea hypopnea syndrome.    HPI:   Ms. Johansen is a 54-year-old woman referred by Dr. Krystina Nation to assist in evaluation and management of suspected sleep disordered breathing.    This evaluation was conducted via telemedicine using secure encrypted software on the [employer] platform.  The patient was physically located in home and the physician was located in the [facility] sleep center in [facility]. The patient's identity was confirmed and verbal consent for the telemedicine interview was obtained.    She has recently been evaluated for palpitations and monitoring has demonstrated supraventricular tachycardia and paroxysmal atrial fibrillation.  Her symptoms are controlled with metoprolol.  In the course of her evaluation symptoms suggesting sleep disordered breathing were identified.    She has a regular sleep schedule with bedtime between 8 and 9 PM, perhaps 10 PM on weekends.  She awakens several times through the night but is able to return to sleep.  She arises at 5 AM on workdays and closer to 7 AM on days off.  She does snore and her  has noted periods of apparent apnea during sleep.  She is tired during the day and finds it difficult to maintain wakefulness.  She is a .  She has not yet completed the [place] sleepiness score.  She does not have symptoms suggesting narcolepsy, parasomnia or restless leg syndrome.  She has not previously been evaluated for sleep issues.    Her medical history is also remarkable for migraine and chronic hip and back pain that may interfere with sleep.  She has a history of gastroesophageal reflux and reactive airways disease.  An echocardiogram on April 23, 2020 demonstrated normal left ventricular systolic function with an estimated ejection fraction of 65%.    Patient Active Problem List    Diagnosis Date Noted   • PAF (paroxysmal atrial fibrillation) ([facility]) 06/24/2020   •  Sinus pain 11/11/2019   • Chronic gastric ulcer without hemorrhage and without perforation 10/07/2019   • Hiatal hernia 10/07/2019   • Family history of diabetes mellitus (DM) 10/07/2019   • Intractable migraine without aura and without status migrainosus 10/07/2019   • Chronic right hip pain 10/07/2019   • Chronic midline low back pain with right-sided sciatica 10/07/2019   • Obesity (BMI 30-39.9) 10/07/2019   • Sensation of fullness in right ear 10/07/2019   • Other chest pain 04/25/2018   • Gastroesophageal reflux disease without esophagitis 04/25/2018   • Reactive airway disease 04/25/2018   • Palpitations 04/25/2018   • Dyspnea on exertion 04/25/2018   • Excessive or frequent menstruation 06/17/2014   • Irregular periods 04/13/2012   • Weight gain 04/13/2012   • Vitamin D deficiency 04/13/2012       Past Medical History:   Diagnosis Date   • Allergy    • Anesthesia     clautraphobic unable to tolerated mask   • Apnea, sleep    • Arthritis     hands and feet   • Daytime sleepiness     sometimes   • Gasping for breath    • Hemorrhoids    • Indigestion    • Infectious disease 5/2014    step throat   • Insomnia    • Migraines    • Morning headache    • PAF (paroxysmal atrial fibrillation) ([facility]) 6/24/2020   • Pleurisy        Past Surgical History:   Procedure Laterality Date   • HYSTEROSCOPY NOVASURE-2  6/17/2014    Performed by [facility] M.D. at SURGERY SAME DAY [facility]   • TUBAL COAGULATION LAPAROSCOPIC BILATERAL  1998 1998   • APPENDECTOMY     • BOWEL RESECTION     • CYSTECTOMY     • EXPLORATORY LAPAROTOMY      for surgical adhesions, polyps   • HEMORRHOIDECTOMY     • PRIMARY C SECTION         Family History   Adopted: Yes   Problem Relation Age of Onset   • Other Mother         hypothyroid   • Diabetes Mother    • Hypertension Mother    • Hyperlipidemia Mother    • Thyroid Mother    • No Known Problems Father         Adopted by father   • No Known Problems Maternal Grandmother    • No Known  Problems Maternal Grandfather    • No Known Problems Sister         Half sister       Social History     Socioeconomic History   • Marital status:      Spouse name: Not on file   • Number of children: Not on file   • Years of education: Not on file   • Highest education level: Not on file   Occupational History   • Not on file   Social Needs   • Financial resource strain: Not on file   • Food insecurity     Worry: Not on file     Inability: Not on file   • Transportation needs     Medical: Not on file     Non-medical: Not on file   Tobacco Use   • Smoking status: Former Smoker     Packs/day: 0.50     Years: 6.00     Pack years: 3.00     Types: Cigarettes     Quit date: 1996     Years since quittin.7   • Smokeless tobacco: Never Used   Substance and Sexual Activity   • Alcohol use: Yes     Frequency: Monthly or less     Comment: occ   • Drug use: No   • Sexual activity: Yes   Lifestyle   • Physical activity     Days per week: Not on file     Minutes per session: Not on file   • Stress: Not on file   Relationships   • Social connections     Talks on phone: Not on file     Gets together: Not on file     Attends [employer] service: Not on file     Active member of club or organization: Not on file     Attends meetings of clubs or organizations: Not on file     Relationship status: Not on file   • Intimate partner violence     Fear of current or ex partner: Not on file     Emotionally abused: Not on file     Physically abused: Not on file     Forced sexual activity: Not on file   Other Topics Concern   • Not on file   Social History Narrative   • Not on file       Current Outpatient Medications   Medication Sig Dispense Refill   • lansoprazole (PREVACID) 30 MG CAPSULE DELAYED RELEASE      • sumatriptan (IMITREX) 100 MG tablet Take 100 mg by mouth Once PRN for Migraine.     • metoprolol (LOPRESSOR) 25 MG Tab Take 1 Tab by mouth 2 times a day. 180 Tab 3   • albuterol 108 (90 Base) MCG/ACT Aero Soln  "inhalation aerosol Inhale 2 Puffs by mouth every four hours as needed (wheezing). 1 Inhaler 0   • furosemide (LASIX) 20 MG Tab Take 1 Tab by mouth every day. (Patient not taking: Reported on 9/11/2020) 30 Tab 2   • potassium chloride (KLOR-CON) 8 MEQ tablet Take 1 Tab by mouth every day. (Patient not taking: Reported on 9/11/2020) 30 Tab 2   • Esomeprazole Magnesium (NEXIUM) 20 MG Pack Take  by mouth.       No current facility-administered medications for this visit.     \"CURRENT RX\"      Allergies: Codeine and Other environmental      ROS  Positive for the sleep, cardiac, respiratory and neurologic and GI issues reviewed above as well as the items in the problem list and past medical history.  All other aspects of the CPT review of systems process are negative.      Physical Exam:   Ht 1.549 m (5' 1\")   Wt 83.5 kg (184 lb)   BMI 34.77 kg/m²    By the telemedicine interface.  She is a well-developed, well-nourished woman who is alert, oriented and appropriately responsive.  She is not dyspneic and is not coughing.    Problems:  1. Sleep apnea, unspecified type  He presents with snoring, witnessed nocturnal apneas and excessive daytime somnolence.  She has a body mass index of 35 and a history of paroxysmal atrial fibrillation.  The clinical probability of sleep apnea hypopnea syndrome is high.  Accurate diagnosis and effective treatment are required not only to improve levels of daytime alertness but also to reduce the cardiac and neurologic risks associated with untreated sleep apnea. We have discussed diagnostic options including in-laboratory, attended polysomnography and home sleep testing. We have also discussed treatment options including airway pressurization, reconstructive otolaryngologic surgery, dental appliances and weight management.    2. Hypersomnolence  Related to the suspected sleep disordered breathing.  She is spending sufficient nightly time in bed and does not seem to have issues with sleep " hygiene.    3. Snoring    4. PAF (paroxysmal atrial fibrillation) ([facility])  Symptoms improved on treatment per cardiology.    5. Mild intermittent reactive airway disease without complication  She is currently asymptomatic and does have an albuterol inhaler to use as needed.    6. Former smoker  She accumulated only 6 pack years of cigarette smoking history and quit in 1996 and is probably not eligible for the [place] lung cancer screening program.    7. BMI 34.0-34.9,adult  We have talked about the role of weight management in the treatment of sleep disordered breathing.  - Height And Weight      Plan:   Home sleep test.    Return visit after testing to review results and treatment options.    He appreciate the opportunity to assist in her care.    Total time for the visit today was 45 minutes.  More than half of that time was devoted to counseling the patient concerning the mechanics of sleep disordered breathing, the benefits of treatment in  improving daytime sleepiness and reducing the risk of future cardiac and neurologic events and in discussing treatment options.

## 2020-10-21 ENCOUNTER — TELEPHONE (OUTPATIENT)
Dept: MEDICAL GROUP | Facility: MEDICAL CENTER | Age: 55
End: 2020-10-21

## 2020-10-21 ENCOUNTER — HOSPITAL ENCOUNTER (OUTPATIENT)
Dept: LAB | Facility: MEDICAL CENTER | Age: 55
End: 2020-10-21
Attending: NURSE PRACTITIONER
Payer: COMMERCIAL

## 2020-10-21 DIAGNOSIS — J06.9 VIRAL UPPER RESPIRATORY TRACT INFECTION: ICD-10-CM

## 2020-10-21 LAB
COVID ORDER STATUS COVID19: NORMAL
SARS-COV-2 RNA RESP QL NAA+PROBE: NOTDETECTED
SPECIMEN SOURCE: NORMAL

## 2020-10-21 PROCEDURE — U0003 INFECTIOUS AGENT DETECTION BY NUCLEIC ACID (DNA OR RNA); SEVERE ACUTE RESPIRATORY SYNDROME CORONAVIRUS 2 (SARS-COV-2) (CORONAVIRUS DISEASE [COVID-19]), AMPLIFIED PROBE TECHNIQUE, MAKING USE OF HIGH THROUGHPUT TECHNOLOGIES AS DESCRIBED BY CMS-2020-01-R: HCPCS

## 2020-10-21 PROCEDURE — C9803 HOPD COVID-19 SPEC COLLECT: HCPCS

## 2020-10-21 NOTE — TELEPHONE ENCOUNTER
Patient will need to be COVID tested prior to returning to work. COVID test ordered, please give instruction on drive through.     NUVIA Ferreira.

## 2020-10-21 NOTE — TELEPHONE ENCOUNTER
VOICEMAIL  1. Caller Name: Ashley Johansen  Call Back Number: [phone] (home)     2. Message: Pt left message yesterday morning stating that she has runny nose, congestion, diarrhea, and had a mild fever on Sunday. Pt is wondering the next steps to proceed in order to return to work. Please advise.

## 2020-10-21 NOTE — TELEPHONE ENCOUNTER
Pt notified. She will go through drive through today. She is still experiencing diarrhea symptoms and will need a note when she is able to return to work..

## 2020-10-29 ENCOUNTER — TELEPHONE (OUTPATIENT)
Dept: MEDICAL GROUP | Facility: MEDICAL CENTER | Age: 55
End: 2020-10-29

## 2020-10-29 NOTE — TELEPHONE ENCOUNTER
----- Message from Elpidio Dunne Ass't sent at 10/23/2020  9:05 AM PDT -----  Regarding: FW:COVID Nasal Swab  Contact: [phone]    ----- Message -----  From: Ashley Johansen  Sent: 10/23/2020   9:01 AM PDT  To: Elpidio Dunne Ass't  Subject: RE:COVID Nasal Swab                              I no longer have a fever and have not had any bathroom issues since Wednesday evening. Still slightly congested and get a tickle in the throat that causes a cough but that is from sinuses I believe. No major drainage or anything like that. Sore throat and headache are also gone.

## 2020-10-29 NOTE — LETTER
October 29, 2020       Patient: Ashley Johansen   YOB: 1965   Date of Visit: 10/29/2020         To Whom It May Concern:    In my medical opinion, I recommend that Ashley Johansen return to full duty, no restrictions on Monday November 2, 2020.    If you have any questions or concerns, please don't hesitate to call [phone]          Sincerely,          Gilda Richards A.P.R.N.  Electronically Signed

## 2020-12-08 ENCOUNTER — TELEMEDICINE (OUTPATIENT)
Dept: SLEEP MEDICINE | Facility: MEDICAL CENTER | Age: 55
End: 2020-12-08
Payer: COMMERCIAL

## 2020-12-08 ENCOUNTER — TELEPHONE (OUTPATIENT)
Dept: SLEEP MEDICINE | Facility: MEDICAL CENTER | Age: 55
End: 2020-12-08

## 2020-12-08 VITALS — HEIGHT: 61 IN | WEIGHT: 184 LBS | BODY MASS INDEX: 34.74 KG/M2

## 2020-12-08 DIAGNOSIS — G47.33 OSA (OBSTRUCTIVE SLEEP APNEA): ICD-10-CM

## 2020-12-08 DIAGNOSIS — I48.0 PAF (PAROXYSMAL ATRIAL FIBRILLATION) (HCC): ICD-10-CM

## 2020-12-08 PROCEDURE — 99213 OFFICE O/P EST LOW 20 MIN: CPT | Mod: 95,CR | Performed by: NURSE PRACTITIONER

## 2020-12-08 ASSESSMENT — FIBROSIS 4 INDEX: FIB4 SCORE: 0.6

## 2020-12-08 NOTE — PROGRESS NOTES
Virtual Visit: Established Patient   This visit was conducted via Zoom using secure and encrypted videoconferencing technology. The patient was in a private location in the state [facility].    The patient's identity was confirmed and verbal consent was obtained for this virtual visit.  Given the importance of social distancing and other strategies recommended to reduce the risk of COVID-19 transmission, I am providing medical care to this patient via audio/video visit in place of an in person visit at the request of the patient.  Subjective:   CC:   Chief Complaint   Patient presents with   • Follow-Up     Apnea-Last seen 09/11/2020   • Results     Home Sleep Study- [facility] 10/16/2020       Ashley Johansen is a 54 y.o. female presenting for evaluation and management of LINA and sleep study results. PMH includes GERD, reactive airway disease, palpitations, migraine, paroxysmal atrial fibrillation is on ASA 81 mg qd, insomnia, chronic right hip pain.    Echocardiogram on April 23, 2020 demonstrated normal left ventricular systolic function with an estimated ejection fraction of 65%.    [facility] 3 night sleep study from 10/3/20 indicated severe obstructive sleep apnea with an AHI of 37.1 and frequent hypopneas and apneas.  On night 1 AHI was 40.8, on night 2 AHI was 37.4 and on night 3 AHI was 33.7.  On night 1 lowest oxygen saturation was 77%, on night 2 76% and on night 3 78%.  Snoring was also noted.  Oxygen saturation below 90% was 135 minutes.  Average heart rate was 69 and lowest heart rate was 57 bpm.    She goes to bed between 8-9 pm and wakes up at 5 am.  She sleeps pretty well through the night.  She does suffer with migraines and sometimes does have headaches in the morning and she also reports snoring.  She does feel very tired during the day and often takes naps.  She tends to fall asleep in her recliner when she returns home from work due to fatigue.  She was walking 3 miles a day prior to her left  knee hurting which she now has a brace on.  She also suffers from chronic right hip pain which also affects her activity level.  Continues to follow-up with cardiology for A. vianca and is on aspirin 81 mg daily as well as metoprolol which she takes as directed.  Her health insurance will be changing to [employer] in January 2021 which she gets through the [facility] [employer].       ROS   Denies any recent fevers or chills. No nausea or vomiting. No chest pains or shortness of breath.     Allergies   Allergen Reactions   • Codeine Vomiting and Swelling   • Other Environmental Hives     Kiwi fruit       Current medicines (including changes today)  Current Outpatient Medications   Medication Sig Dispense Refill   • lansoprazole (PREVACID) 30 MG CAPSULE DELAYED RELEASE      • sumatriptan (IMITREX) 100 MG tablet Take 100 mg by mouth Once PRN for Migraine.     • metoprolol (LOPRESSOR) 25 MG Tab Take 1 Tab by mouth 2 times a day. 180 Tab 3   • albuterol 108 (90 Base) MCG/ACT Aero Soln inhalation aerosol Inhale 2 Puffs by mouth every four hours as needed (wheezing). 1 Inhaler 0   • furosemide (LASIX) 20 MG Tab Take 1 Tab by mouth every day. (Patient not taking: Reported on 9/11/2020) 30 Tab 2   • potassium chloride (KLOR-CON) 8 MEQ tablet Take 1 Tab by mouth every day. (Patient not taking: Reported on 9/11/2020) 30 Tab 2   • Esomeprazole Magnesium (NEXIUM) 20 MG Pack Take  by mouth.       No current facility-administered medications for this visit.        Patient Active Problem List    Diagnosis Date Noted   • PAF (paroxysmal atrial fibrillation) ([facility]) 06/24/2020   • Sinus pain 11/11/2019   • Chronic gastric ulcer without hemorrhage and without perforation 10/07/2019   • Hiatal hernia 10/07/2019   • Family history of diabetes mellitus (DM) 10/07/2019   • Intractable migraine without aura and without status migrainosus 10/07/2019   • Chronic right hip pain 10/07/2019   • Chronic midline low back pain with right-sided  "sciatica 10/07/2019   • Obesity (BMI 30-39.9) 10/07/2019   • Sensation of fullness in right ear 10/07/2019   • Other chest pain 04/25/2018   • Gastroesophageal reflux disease without esophagitis 04/25/2018   • Reactive airway disease 04/25/2018   • Palpitations 04/25/2018   • Dyspnea on exertion 04/25/2018   • Excessive or frequent menstruation 06/17/2014   • Irregular periods 04/13/2012   • Weight gain 04/13/2012   • Vitamin D deficiency 04/13/2012       Family History   Adopted: Yes   Problem Relation Age of Onset   • Other Mother         hypothyroid   • Diabetes Mother    • Hypertension Mother    • Hyperlipidemia Mother    • Thyroid Mother    • No Known Problems Father         Adopted by father   • No Known Problems Maternal Grandmother    • No Known Problems Maternal Grandfather    • No Known Problems Sister         Half sister       She  has a past medical history of Allergy, Anesthesia, Apnea, sleep, Arthritis, Daytime sleepiness, Gasping for breath, Hemorrhoids, Indigestion, Infectious disease (5/2014), Insomnia, Migraines, Morning headache, PAF (paroxysmal atrial fibrillation) ([facility]) (6/24/2020), and Pleurisy.  She  has a past surgical history that includes cystectomy; hemorrhoidectomy; exploratory laparotomy; appendectomy; bowel resection; tubal coagulation laparoscopic bilateral (1998); primary c section; and hysteroscopy novasure-2 (6/17/2014).       Objective:   Ht 1.549 m (5' 1\")   Wt 83.5 kg (184 lb)   BMI 34.77 kg/m²     Physical Exam:  Constitutional: Alert, no distress, well-groomed.  Skin: No rashes in visible areas.  Eye: Round. Conjunctiva clear, lids normal. No icterus.   ENMT: Lips pink without lesions, good dentition, moist mucous membranes. Phonation normal.  Neck: Moves freely without pain.  Respiratory: Unlabored respiratory effort, no cough or audible wheeze  Psych: Alert and oriented x3, normal affect and mood.       Assessment and Plan:   The following treatment plan was discussed: "     1. LINA (obstructive sleep apnea)    2. PAF (paroxysmal atrial fibrillation) (HCC)    3. BMI 34.0-34.9,adult      Discussed the cardiovascular and neuropsychiatric risks of untreated LINA; including but not limited to: HTN, DM, MI, ASCVD, CVA, CHF, traffic accidents.     1. [facility]om 3 night sleep study from 10/3/20 indicated severe obstructive sleep apnea with an AHI of 37.1 and frequent hypopneas and apneas.  On night 1 AHI was 40.8, on night 2 AHI was 37.4 and on night 3 AHI was 33.7.  On night 1 lowest oxygen saturation was 77%, on night 2 76% and on night 3 78%.  Snoring was also noted.  Oxygen saturation below 90% was 135 minutes.  Average heart rate was 69 and lowest heart rate was 57 bpm.  Recommendation:  Auto titrating CPAP or a CPAP would be appropriate.  Treatment would also include careful attention to proper sleep hygiene, weight reduction if the BMI is elevated, avoidance of sleeping in the supine position and avoidance of alcohol within 4 hours of bedtime.  Other alternative treatment options such as mandibular advancement device and UPPP were also reviewed with the patient today.    2.  Patient is amenable to auto CPAP trial.  DME order for autoCPAP 5-15 cmH2O, mask (MOC) and supplies will be placed in January of 2021 when patient's insurance changes to [employer]. Clean mask and supplies weekly, and change supplies per insurance guidelines.  Advised patient to use the CPAP every night for more than 4 hours for optimal health benefit and to meet the health insurance 70 percent compliance guideline.  She may also switch the mask out within the first 30 days after she obtains her equipment if needed.  3. Encouraged a healthy diet and exercise to help with weight loss and management.   4. Follow up with the appropriate healthcare practitioners for all other medical problems and issues.  Advised patient to follow-up with PCP if left knee pain persists.  5. Sleep hygiene discussed.  6. Continue to f/u with  cardiology for A-fib.       Follow-up: Return in about 4 months (around 4/8/2021).    NUVIA Bryan.    This dictation was created using voice recognition software. The accuracy of the dictation is limited to the abilities of the software. I expect there may be some errors of grammar and possibly content.

## 2020-12-08 NOTE — TELEPHONE ENCOUNTER
Pt will have new [employer] insurance at the beginning of the year  We will wait until then to place the order and obtain auth

## 2020-12-10 ENCOUNTER — PATIENT MESSAGE (OUTPATIENT)
Dept: SLEEP MEDICINE | Facility: MEDICAL CENTER | Age: 55
End: 2020-12-10

## 2021-01-07 ENCOUNTER — PATIENT MESSAGE (OUTPATIENT)
Dept: SLEEP MEDICINE | Facility: MEDICAL CENTER | Age: 56
End: 2021-01-07

## 2021-03-26 ENCOUNTER — NURSE TRIAGE (OUTPATIENT)
Dept: HEALTH INFORMATION MANAGEMENT | Facility: OTHER | Age: 56
End: 2021-03-26

## 2021-03-26 NOTE — TELEPHONE ENCOUNTER
PT CALLING AND C/O UPPER RIGHT BACK PAIN, NECK AND SOME NUMBNESS NOTED IN RIGHT 3 FINGERS. PAIN RATES ABOUT A MODERATE AND HAS BEEN OCCURRING X4 MONTHS. APPT MADE WITH PCP, DISCUSSED HOME CARE AND WHEN TO SEEK EMERGENT CARE. ALL QUESTIONS ANSWERED    Reason for Disposition  • Back pain lasts > 2 weeks    Additional Information  • Negative: Passed out (i.e., fainted, collapsed and was not responding)  • Negative: Shock suspected (e.g., cold/pale/clammy skin, too weak to stand, low BP, rapid pulse)  • Negative: Sounds like a life-threatening emergency to the triager  • Negative: Major injury to the back (e.g., MVA, fall > 10 feet or 3 meters, penetrating injury, etc.)  • Negative: Pain in the upper back over the ribs (rib cage) that radiates (travels) into the chest  • Negative: Pain in the upper back over the ribs (rib cage) and worsened by coughing (or clearly increases with breathing)  • Negative: SEVERE back pain of sudden onset and age > 60  • Negative: SEVERE abdominal pain (e.g., excruciating)  • Negative: Abdominal pain and age > 60  • Negative: Unable to urinate (or only a few drops) and bladder feels very full  • Negative: Loss of bladder or bowel control (urine or bowel incontinence; wetting self, leaking stool) of new onset  • Negative: Numbness (loss of sensation) in groin or rectal area  • Negative: Pain radiates into groin, scrotum  • Negative: Blood in urine (red, pink, or tea-colored)  • Negative: Vomiting and pain over lower ribs of back (i.e., flank - kidney area)  • Negative: Weakness of a leg or foot (e.g., unable to bear weight, dragging foot)  • Negative: Patient sounds very sick or weak to the triager  • Negative: Fever > 100.5 F (38.1 C) and flank pain  • Negative: Pain or burning with passing urine (urination)  • Negative: Age > 50 and no history of prior similar back pain  • Negative: SEVERE back pain (e.g., excruciating, unable to do any normal activities) and not improved after pain  "medicine and CARE ADVICE  • Negative: Numbness in an arm or hand (i.e., loss of sensation) and upper back pain  • Negative: Numbness in a leg or foot (i.e., loss of sensation)  • Negative: High-risk adult (e.g., history of cancer, history of HIV, or history of IV drug abuse)  • Negative: Painful rash with multiple small blisters grouped together (i.e., dermatomal distribution or 'band' or 'stripe')  • Negative: Pain radiates into the thigh or further down the leg, and in both legs  • Negative: MODERATE back pain (e.g., interferes with normal activities) and present > 3 days  • Negative: Pain radiates into the thigh or further down the leg  • Negative: Patient wants to be seen    Answer Assessment - Initial Assessment Questions  1. ONSET: \"When did the pain begin?\"      4 months  2. LOCATION: \"Where does it hurt?\" (upper, mid or lower back)      Right back, neck  3. SEVERITY: \"How bad is the pain?\"  (e.g., Scale 1-10; mild, moderate, or severe)    - MILD (1-3): doesn't interfere with normal activities     - MODERATE (4-7): interferes with normal activities or awakens from sleep     - SEVERE (8-10): excruciating pain, unable to do any normal activities       moderate  4. PATTERN: \"Is the pain constant?\" (e.g., yes, no; constant, intermittent)       constant  5. RADIATION: \"Does the pain shoot into your legs or elsewhere?\"      arm  6. CAUSE:  \"What do you think is causing the back pain?\"       unsure  7. BACK OVERUSE:  \"Any recent lifting of heavy objects, strenuous work or exercise?\"      no  8. MEDICATIONS: \"What have you taken so far for the pain?\" (e.g., nothing, acetaminophen, NSAIDS)      tylenol  9. NEUROLOGIC SYMPTOMS: \"Do you have any weakness, numbness, or problems with bowel/bladder control?\"      Numbness and tingling in right arm and fingers  10. OTHER SYMPTOMS: \"Do you have any other symptoms?\" (e.g., fever, abdominal pain, burning with urination, blood in urine)        no  11. PREGNANCY: \"Is there any " "chance you are pregnant?\" (e.g., yes, no; LMP)        no    Protocols used: BACK PAIN-A-OH      "

## 2021-03-29 ENCOUNTER — TELEPHONE (OUTPATIENT)
Dept: MEDICAL GROUP | Facility: MEDICAL CENTER | Age: 56
End: 2021-03-29

## 2021-03-29 NOTE — TELEPHONE ENCOUNTER
ESTABLISHED PATIENT PRE-VISIT PLANNING     Patient was NOT contacted to complete PVP.  ----------------------------------------------------------------------------------------------------    1.  Reviewed notes from the last few office visits within the medical group: Yes    2.  If any orders were placed at last visit or intended to be done for this visit (i.e. 6 mos follow-up), do we have Results/Consult Notes?        •  Labs - Labs were not ordered at last office visit.       •  Imaging - Imaging was not ordered at last office visit.       •  Referrals - No referrals were ordered at last office visit.    3. Is this appointment scheduled as a Hospital Follow-Up? No    4.  Immunizations were updated in Epic using Reconcile Outside Information activity? Yes. Immunizations reconciled through Web-IZ and outside sources. Immunization records up to date.       5.  Patient is due for the following Health Maintenance Topics:   Health Maintenance Due   Topic Date Due   • IMM PNEUMOCOCCAL VACCINE: 0-64 Years (1 of 1 - PPSV23) Never done   • PAP SMEAR  05/07/2015   • COLONOSCOPY  Never done   • IMM ZOSTER VACCINES (1 of 2) Never done   • IMM INFLUENZA (1) Never done     6.  AHA (Pulse8) form printed for Provider? N/A    ----------------------------------------------------------------------------------------------------  Pre-Visit Planning note has been created for the Provider and Medical Assistant to review prior to the patient's office appointment. Patient is NOT REQUIRED to follow-up on this note.   Outside information NOT reconciled using the Mogotest feature. Per Cortney Tong, the Mogotest feature is down as of 02/09/2021 at 2:00pm. Will reconcile outside information at a later date.

## 2021-04-05 ENCOUNTER — TELEMEDICINE (OUTPATIENT)
Dept: MEDICAL GROUP | Facility: MEDICAL CENTER | Age: 56
End: 2021-04-05
Payer: COMMERCIAL

## 2021-04-05 VITALS
BODY MASS INDEX: 34.74 KG/M2 | WEIGHT: 184 LBS | HEIGHT: 61 IN | DIASTOLIC BLOOD PRESSURE: 71 MMHG | SYSTOLIC BLOOD PRESSURE: 128 MMHG

## 2021-04-05 DIAGNOSIS — M54.12 CERVICAL RADICULOPATHY: ICD-10-CM

## 2021-04-05 DIAGNOSIS — M54.6 CHRONIC THORACIC BACK PAIN, UNSPECIFIED BACK PAIN LATERALITY: ICD-10-CM

## 2021-04-05 DIAGNOSIS — G89.29 CHRONIC THORACIC BACK PAIN, UNSPECIFIED BACK PAIN LATERALITY: ICD-10-CM

## 2021-04-05 PROCEDURE — 99214 OFFICE O/P EST MOD 30 MIN: CPT | Mod: 95,CR | Performed by: NURSE PRACTITIONER

## 2021-04-05 RX ORDER — PREDNISONE 20 MG/1
40 TABLET ORAL DAILY
Qty: 10 TABLET | Refills: 0 | Status: SHIPPED | OUTPATIENT
Start: 2021-04-05 | End: 2021-04-10

## 2021-04-05 RX ORDER — TIZANIDINE 4 MG/1
4 TABLET ORAL 2 TIMES DAILY PRN
Qty: 30 TABLET | Refills: 1 | Status: SHIPPED | OUTPATIENT
Start: 2021-04-05 | End: 2021-05-13 | Stop reason: SDUPTHER

## 2021-04-05 ASSESSMENT — FIBROSIS 4 INDEX: FIB4 SCORE: 0.61

## 2021-04-05 ASSESSMENT — PATIENT HEALTH QUESTIONNAIRE - PHQ9: CLINICAL INTERPRETATION OF PHQ2 SCORE: 0

## 2021-04-05 NOTE — PROGRESS NOTES
Telemedicine Visit: Established Patient     This encounter was conducted via Zoom.   Verbal consent was obtained. Patient's identity was verified.    Subjective:   CC: Neck, back pain  Ashley Johansen is a 55 y.o. female presenting for evaluation and management of:     Cervical radiculopathy  Has pain above bra strap of back and neck for 1-1.5 years. Worsening over the past 4 months, now getting numbness and tingling in her neck which has now progressed to middle, 4th finger, and thumb.     Has had some weight gain. Started CPAP therapy.     Unable to take motrin or nsaids due to metoprolol.     Has been doing ice/heat. Has tried tylenol which helps if pain is more mild.     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     Pain is worse with looking up or down, after computer work.          ROS   Denies any recent fevers or chills. No nausea or vomiting. No chest pains or shortness of breath.     Allergies   Allergen Reactions   • Codeine Vomiting and Swelling   • Other Environmental Hives     Kiwi fruit       Current medicines (including changes today)  Current Outpatient Medications   Medication Sig Dispense Refill   • predniSONE (DELTASONE) 20 MG Tab Take 2 Tablets by mouth every day for 5 days. 10 tablet 0   • tizanidine (ZANAFLEX) 4 MG Tab Take 1 tablet by mouth 2 times a day as needed. 30 tablet 1   • sumatriptan (IMITREX) 100 MG tablet Take 100 mg by mouth Once PRN for Migraine.     • metoprolol (LOPRESSOR) 25 MG Tab Take 1 Tab by mouth 2 times a day. 180 Tab 3   • albuterol 108 (90 Base) MCG/ACT Aero Soln inhalation aerosol Inhale 2 Puffs by mouth every four hours as needed (wheezing). 1 Inhaler 0   • Esomeprazole Magnesium (NEXIUM) 20 MG Pack Take  by mouth.     • lansoprazole (PREVACID) 30 MG CAPSULE DELAYED RELEASE        No current  facility-administered medications for this visit.       Patient Active Problem List    Diagnosis Date Noted   • Cervical radiculopathy 04/05/2021   • PAF (paroxysmal atrial fibrillation) (Formerly [facility]) 06/24/2020   • Sinus pain 11/11/2019   • Chronic gastric ulcer without hemorrhage and without perforation 10/07/2019   • Hiatal hernia 10/07/2019   • Family history of diabetes mellitus (DM) 10/07/2019   • Intractable migraine without aura and without status migrainosus 10/07/2019   • Chronic right hip pain 10/07/2019   • Chronic midline low back pain with right-sided sciatica 10/07/2019   • Obesity (BMI 30-39.9) 10/07/2019   • Sensation of fullness in right ear 10/07/2019   • Other chest pain 04/25/2018   • Gastroesophageal reflux disease without esophagitis 04/25/2018   • Reactive airway disease 04/25/2018   • Palpitations 04/25/2018   • Dyspnea on exertion 04/25/2018   • Excessive or frequent menstruation 06/17/2014   • Irregular periods 04/13/2012   • Weight gain 04/13/2012   • Vitamin D deficiency 04/13/2012       Family History   Adopted: Yes   Problem Relation Age of Onset   • Other Mother         hypothyroid   • Diabetes Mother    • Hypertension Mother    • Hyperlipidemia Mother    • Thyroid Mother    • No Known Problems Father         Adopted by father   • No Known Problems Maternal Grandmother    • No Known Problems Maternal Grandfather    • No Known Problems Sister         Half sister       She  has a past medical history of Allergy, Anesthesia, Apnea, sleep, Arthritis, Daytime sleepiness, Gasping for breath, Hemorrhoids, Indigestion, Infectious disease (5/2014), Insomnia, Migraines, Morning headache, PAF (paroxysmal atrial fibrillation) (Formerly [facility]) (6/24/2020), and Pleurisy.  She  has a past surgical history that includes cystectomy; hemorrhoidectomy; exploratory laparotomy; appendectomy; bowel resection; tubal coagulation laparoscopic bilateral (1998); primary c section; and hysteroscopy novasure-2 (6/17/2014).        "Objective:   /71 (BP Location: Left arm)   Ht 1.549 m (5' 1\")   Wt 83.5 kg (184 lb)   BMI 34.77 kg/m²     Physical Exam:  Constitutional: Alert, no distress, well-groomed.  Skin: No rashes in visible areas.  Eye: Round. Conjunctiva clear, lids normal. No icterus.   ENMT: Lips pink without lesions, good dentition, moist mucous membranes. Phonation normal.  Neck: No masses, no thyromegaly. Moves freely without pain.  CV: Pulse as reported by patient  Respiratory: Unlabored respiratory effort, no cough or audible wheeze  Psych: Alert and oriented x3, normal affect and mood.       Assessment and Plan:   The following treatment plan was discussed:     1. Cervical radiculopathy  Unstable  Trial prednisone burst 40 mg x 5 days   tizanidine nightly as needed for pain/spasm  Follow-up with physical therapy  - predniSONE (DELTASONE) 20 MG Tab; Take 2 Tablets by mouth every day for 5 days.  Dispense: 10 tablet; Refill: 0  - REFERRAL TO PHYSICAL THERAPY  - tizanidine (ZANAFLEX) 4 MG Tab; Take 1 tablet by mouth 2 times a day as needed.  Dispense: 30 tablet; Refill: 1    2. Chronic thoracic back pain, unspecified back pain laterality  As above  - predniSONE (DELTASONE) 20 MG Tab; Take 2 Tablets by mouth every day for 5 days.  Dispense: 10 tablet; Refill: 0  - REFERRAL TO PHYSICAL THERAPY  - tizanidine (ZANAFLEX) 4 MG Tab; Take 1 tablet by mouth 2 times a day as needed.  Dispense: 30 tablet; Refill: 1        Follow-up: Return in about 4 weeks (around 5/3/2021) for back/neck pain.            "

## 2021-04-05 NOTE — ASSESSMENT & PLAN NOTE
Has pain above bra strap of back and neck for 1-1.5 years. Worsening over the past 4 months, now getting numbness and tingling in her neck which has now progressed to middle, 4th finger, and thumb.     Has had some weight gain. Started CPAP therapy.     Unable to take motrin or nsaids due to metoprolol.     Has been doing ice/heat. Has tried tylenol which helps if pain is more mild.     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     Pain is worse with looking up or down, after computer work.

## 2021-04-19 ENCOUNTER — TELEMEDICINE (OUTPATIENT)
Dept: SLEEP MEDICINE | Facility: MEDICAL CENTER | Age: 56
End: 2021-04-19
Payer: COMMERCIAL

## 2021-04-19 ENCOUNTER — TELEPHONE (OUTPATIENT)
Dept: SLEEP MEDICINE | Facility: MEDICAL CENTER | Age: 56
End: 2021-04-19

## 2021-04-19 VITALS
DIASTOLIC BLOOD PRESSURE: 74 MMHG | SYSTOLIC BLOOD PRESSURE: 138 MMHG | HEIGHT: 61 IN | BODY MASS INDEX: 34.74 KG/M2 | WEIGHT: 184 LBS

## 2021-04-19 DIAGNOSIS — G47.33 OSA (OBSTRUCTIVE SLEEP APNEA): ICD-10-CM

## 2021-04-19 PROCEDURE — 99213 OFFICE O/P EST LOW 20 MIN: CPT | Mod: 95,CR | Performed by: FAMILY MEDICINE

## 2021-04-19 ASSESSMENT — FIBROSIS 4 INDEX: FIB4 SCORE: 0.61

## 2021-04-19 NOTE — PROGRESS NOTES
Telemedicine Visit: Established Patient     This encounter was conducted via Zoom. Verbal consent was obtained. Patient's identity was verified.       Given the importance of social distancing and other strategies recommended to reduce the risk of COVID-19 transmission, I am providing medical care to this patient via audio/video visit in place of an in person visit at the request of the patient.    [facility] Follow Up Note     Date: 4/19/2021 / Time: 10:15 AM    Patient ID:   Name:             Ashley Johansen   YOB: 1965  Age:                 55 y.o.  female   MRN:               [id number]      Thank you for requesting a sleep medicine consultation on Ashley Johansen at the sleep center. She presents today with the chief complaints of LINA follow up.     HISTORY OF PRESENT ILLNESS:       Pt is currently on autoCPAP 5-15 cmH2O. She goes to sleep around 8:30 pm and wakes up around 4:30 am. She is getting about 7 hrs of sleep on a good night. The bad nights are rare per week since she has been on the CPAP. Overall,  She does wake up feeling rested but has sleepiness in the evening. She take regular naps. She denies any symptoms of RLS, narcolepsy or any symptoms to suggest parasomnias such as nightmares, sleep walking or acting out of dreams.      She is using CPAP most days of the week. Pt reports 8 hrs of average nightly use of CPAP. Pt denies snoring, gasping,choking.Pt also denies significant mask leak that is interfering with sleep. The 30 day compliance was downloaded which shows adequate compliance with more that 4 hr usage about 100%. The AHI is has improved to 1.4/hr. The average pressure is 12-15 cm. The mask leak is normal. The symptoms of OS are well controlled with the therapy. However she has been having late evening sleepiness.     PMH includes GERD, reactive airway disease, palpitations, migraine, paroxysmal atrial fibrillation. Her last ECHO was on 4/23/20 which  showed EF of 65%. Her current meds include Lopresor, Imitrex and zenaflex.     SLEEP HISTORY   Novas 3 night sleep study from 10/3/20 indicated severe obstructive sleep apnea with an AHI of 37.1 and frequent hypopneas and apneas.  On night 1 AHI was 40.8, on night 2 AHI was 37.4 and on night 3 AHI was 33.7.  On night 1 lowest oxygen saturation was 77%, on night 2 76% and on night 3 78%.  Snoring was also noted.  Oxygen saturation below 90% was 135 minutes.  Average heart rate was 69 and lowest heart rate was 57 bpm.      REVIEW OF SYSTEMS:       Constitutional: Denies fevers, Denies weight changes  Eyes: Denies changes in vision, no eye pain  Ears/Nose/Throat/Mouth: Denies nasal congestion or sore throat   Cardiovascular: Denies chest pain or palpitations   Respiratory: Denies shortness of breath , Denies cough  Gastrointestinal/Hepatic: Denies abdominal pain, nausea, vomiting, diarrhea, constipation or GI bleeding   Genitourinary: Deniesdysuria or frequency  Musculoskeletal/Rheum: Denies  joint pain and swelling   Skin/Breast: Denies rash,   Neurological: Denies headache, confusion, memory loss or focal weakness/parasthesias  Psychiatric: denies mood disorder   Sleep: denies snoring   Comprehensive review of systems form is reviewed with the patient and is attached in the EMR.     PMH:  has a past medical history of Allergy, Anesthesia, Apnea, sleep, Arthritis, Daytime sleepiness, Gasping for breath, Hemorrhoids, Indigestion, Infectious disease (5/2014), Insomnia, Migraines, Morning headache, PAF (paroxysmal atrial fibrillation) ([facility]) (6/24/2020), and Pleurisy.  MEDS:   Current Outpatient Medications:   •  lansoprazole (PREVACID) 30 MG CAPSULE DELAYED RELEASE, , Disp: , Rfl:   •  sumatriptan (IMITREX) 100 MG tablet, Take 100 mg by mouth Once PRN for Migraine., Disp: , Rfl:   •  metoprolol (LOPRESSOR) 25 MG Tab, Take 1 Tab by mouth 2 times a day., Disp: 180 Tab, Rfl: 3  •  albuterol 108 (90 Base) MCG/ACT Aero Soln  "inhalation aerosol, Inhale 2 Puffs by mouth every four hours as needed (wheezing)., Disp: 1 Inhaler, Rfl: 0  •  Esomeprazole Magnesium (NEXIUM) 20 MG Pack, Take  by mouth., Disp: , Rfl:   •  tizanidine (ZANAFLEX) 4 MG Tab, Take 1 tablet by mouth 2 times a day as needed. (Patient not taking: Reported on 4/19/2021), Disp: 30 tablet, Rfl: 1  ALLERGIES:   Allergies   Allergen Reactions   • Codeine Vomiting and Swelling   • Other Environmental Hives     Kiwi fruit     SURGHX:   Past Surgical History:   Procedure Laterality Date   • HYSTEROSCOPY NOVASURE-2  6/17/2014    Performed by [facility]ason M.D. at SURGERY SAME DAY [facility]   • TUBAL COAGULATION LAPAROSCOPIC BILATERAL  1998 1998   • APPENDECTOMY     • BOWEL RESECTION     • CYSTECTOMY     • EXPLORATORY LAPAROTOMY      for surgical adhesions, polyps   • HEMORRHOIDECTOMY     • PRIMARY C SECTION       SOCHX:  reports that she quit smoking about 25 years ago. Her smoking use included cigarettes. She has a 3.00 pack-year smoking history. She has never used smokeless tobacco. She reports current alcohol use. She reports that she does not use drugs..  FH:   Family History   Adopted: Yes   Problem Relation Age of Onset   • Other Mother         hypothyroid   • Diabetes Mother    • Hypertension Mother    • Hyperlipidemia Mother    • Thyroid Mother    • No Known Problems Father         Adopted by father   • No Known Problems Maternal Grandmother    • No Known Problems Maternal Grandfather    • No Known Problems Sister         Half sister         Physical Exam:  Vitals/ General Appearance:   Weight/BMI: Body mass index is 34.77 kg/m².  /74 (BP Location: Left arm, Patient Position: Sitting, BP Cuff Size: Adult)   Ht 1.549 m (5' 1\")   Wt 83.5 kg (184 lb)   Vitals:    04/19/21 1005   BP: 138/74   BP Location: Left arm   Patient Position: Sitting   BP Cuff Size: Adult   Weight: 83.5 kg (184 lb)   Height: 1.549 m (5' 1\")       Pt. is alert and oriented to time, " place and person. Cooperative and in no apparent distress.       Constitutional: Alert, no distress, well-groomed.  Skin: No rashes in visible areas.  Eye: Round. Conjunctiva clear, lids normal. No icterus.   ENMT: Lips pink without lesions, good dentition, moist mucous membranes. Phonation normal.  Neck: No masses, no thyromegaly. Moves freely without pain.  CV: Pulse as reported by patient  Respiratory: Unlabored respiratory effort, no cough or audible wheeze  Psych: Alert and oriented x3, normal affect and mood.     ASSESSMENT AND PLAN     1. Sleep Apnea\   The pathophysiology of sleep anea and the increased risk of cardiovascular morbidity from untreated sleep apnea is discussed in detail with the patient.      She is urged to avoid supine sleep, weight gain and alcoholic beverages since all of these can worsen sleep apnea. She is cautioned against drowsy driving. If She feels sleepy while driving, She must pull over for a break/nap, rather than persist on the road, in the interest of She own safety and that of others on the road.   Plan   - Changing pressure to ACPAP 10-15 cm because she has residual EDS which could be undetected hypopnea   - supplies are refilled   - compliance download was reviewed and discussed with the pt   - compliance was reinforced     2. Regarding treatment of other past medical problems and general health maintenance,  She is urged to follow up with PCP.

## 2021-04-26 ENCOUNTER — TELEPHONE (OUTPATIENT)
Dept: MEDICAL GROUP | Facility: MEDICAL CENTER | Age: 56
End: 2021-04-26

## 2021-04-26 NOTE — TELEPHONE ENCOUNTER
ESTABLISHED PATIENT PRE-VISIT PLANNING     Patient was NOT contacted to complete PVP.      1.  Reviewed notes from the last few office visits within the medical group: Yes    2.  If any orders were placed at last visit or intended to be done for this visit (i.e. 6 mos follow-up), do we have Results/Consult Notes?   · Labs - Labs were not ordered at last office visit.  · Imaging - Imaging was not ordered at last office visit.  · Referrals - Referral ordered, patient has NOT been seen. Referral to Physical Therapy has been AUTHORIZED.    3. Is this appointment scheduled as a Hospital Follow-Up? No    4.  Immunizations were updated in Epic using Reconcile Outside Information activity? Yes    5.  Patient is due for the following Health Maintenance Topics:   Health Maintenance Due   Topic Date Due   • IMM PNEUMOCOCCAL VACCINE: 0-64 Years (1 of 1 - PPSV23) Never done   • COVID-19 Vaccine (1) Never done   • PAP SMEAR  05/07/2015   • COLONOSCOPY  Never done   • IMM ZOSTER VACCINES (1 of 2) Never done       -----------------------------------------------------------------------------------------------  This pre-Visit Planning note has been created for the Provider and Medical Assistant to review prior to the patient's office appointment. Patient is *NOT REQUIRED* to follow-up on this note.   Outside information NOT reconciled using the Pongo Resume feature. Per Cortney Tong, the Pongo Resume feature is down as of 02/09/2021 at 2:00pm. Will reconcile outside information at a later date.

## 2021-05-03 ENCOUNTER — APPOINTMENT (OUTPATIENT)
Dept: MEDICAL GROUP | Facility: MEDICAL CENTER | Age: 56
End: 2021-05-03
Payer: COMMERCIAL

## 2021-05-11 ENCOUNTER — TELEPHONE (OUTPATIENT)
Dept: CARDIOLOGY | Facility: MEDICAL CENTER | Age: 56
End: 2021-05-11

## 2021-05-11 NOTE — TELEPHONE ENCOUNTER
DB    Pt called needing a refill of metoprolol (LOPRESSOR) 25 MG Tab sent to [facility] in [place]. Pt is leaving the state today and needs the refill before she leaves.    Thank you

## 2021-05-13 DIAGNOSIS — G89.29 CHRONIC THORACIC BACK PAIN, UNSPECIFIED BACK PAIN LATERALITY: ICD-10-CM

## 2021-05-13 DIAGNOSIS — M54.6 CHRONIC THORACIC BACK PAIN, UNSPECIFIED BACK PAIN LATERALITY: ICD-10-CM

## 2021-05-13 DIAGNOSIS — M54.12 CERVICAL RADICULOPATHY: ICD-10-CM

## 2021-05-13 RX ORDER — TIZANIDINE 4 MG/1
4 TABLET ORAL 2 TIMES DAILY PRN
Qty: 30 TABLET | Refills: 1 | Status: SHIPPED | OUTPATIENT
Start: 2021-05-13 | End: 2021-07-28

## 2021-07-28 ENCOUNTER — HOSPITAL ENCOUNTER (OUTPATIENT)
Dept: LAB | Facility: MEDICAL CENTER | Age: 56
End: 2021-07-28
Attending: INTERNAL MEDICINE
Payer: COMMERCIAL

## 2021-07-28 ENCOUNTER — OFFICE VISIT (OUTPATIENT)
Dept: CARDIOLOGY | Facility: PHYSICIAN GROUP | Age: 56
End: 2021-07-28
Payer: COMMERCIAL

## 2021-07-28 VITALS
OXYGEN SATURATION: 98 % | HEIGHT: 61 IN | DIASTOLIC BLOOD PRESSURE: 64 MMHG | WEIGHT: 207 LBS | BODY MASS INDEX: 39.08 KG/M2 | RESPIRATION RATE: 12 BRPM | SYSTOLIC BLOOD PRESSURE: 110 MMHG | HEART RATE: 66 BPM

## 2021-07-28 DIAGNOSIS — R07.89 OTHER CHEST PAIN: ICD-10-CM

## 2021-07-28 DIAGNOSIS — I48.0 PAF (PAROXYSMAL ATRIAL FIBRILLATION) (HCC): ICD-10-CM

## 2021-07-28 DIAGNOSIS — Z79.899 HIGH RISK MEDICATION USE: ICD-10-CM

## 2021-07-28 DIAGNOSIS — R00.2 PALPITATIONS: ICD-10-CM

## 2021-07-28 DIAGNOSIS — G47.33 OSA (OBSTRUCTIVE SLEEP APNEA): ICD-10-CM

## 2021-07-28 DIAGNOSIS — I47.10 SVT (SUPRAVENTRICULAR TACHYCARDIA) (HCC): ICD-10-CM

## 2021-07-28 DIAGNOSIS — R42 LIGHTHEADED: ICD-10-CM

## 2021-07-28 DIAGNOSIS — G47.33 OSA ON CPAP: ICD-10-CM

## 2021-07-28 DIAGNOSIS — J45.20 MILD INTERMITTENT REACTIVE AIRWAY DISEASE WITHOUT COMPLICATION: ICD-10-CM

## 2021-07-28 DIAGNOSIS — R06.09 DYSPNEA ON EXERTION: ICD-10-CM

## 2021-07-28 LAB
ANION GAP SERPL CALC-SCNC: 11 MMOL/L (ref 7–16)
BUN SERPL-MCNC: 15 MG/DL (ref 8–22)
CALCIUM SERPL-MCNC: 8.8 MG/DL (ref 8.5–10.5)
CHLORIDE SERPL-SCNC: 104 MMOL/L (ref 96–112)
CO2 SERPL-SCNC: 24 MMOL/L (ref 20–33)
CREAT SERPL-MCNC: 0.8 MG/DL (ref 0.5–1.4)
GLUCOSE SERPL-MCNC: 98 MG/DL (ref 65–99)
POTASSIUM SERPL-SCNC: 4 MMOL/L (ref 3.6–5.5)
SODIUM SERPL-SCNC: 139 MMOL/L (ref 135–145)

## 2021-07-28 PROCEDURE — 80048 BASIC METABOLIC PNL TOTAL CA: CPT

## 2021-07-28 PROCEDURE — 99214 OFFICE O/P EST MOD 30 MIN: CPT | Performed by: INTERNAL MEDICINE

## 2021-07-28 PROCEDURE — 36415 COLL VENOUS BLD VENIPUNCTURE: CPT

## 2021-07-28 RX ORDER — FLECAINIDE ACETATE 50 MG/1
50 TABLET ORAL 2 TIMES DAILY
Qty: 180 TABLET | Refills: 3 | Status: SHIPPED | OUTPATIENT
Start: 2021-07-28 | End: 2021-11-10 | Stop reason: SDUPTHER

## 2021-07-28 RX ORDER — METOPROLOL SUCCINATE 25 MG/1
12.5 TABLET, EXTENDED RELEASE ORAL DAILY
Qty: 45 TABLET | Refills: 3 | Status: SHIPPED | OUTPATIENT
Start: 2021-07-28 | End: 2021-11-10 | Stop reason: SDUPTHER

## 2021-07-28 ASSESSMENT — FIBROSIS 4 INDEX: FIB4 SCORE: 0.61

## 2021-07-28 NOTE — LETTER
"     [facility]   [address]  [place] [address]  Phone: [phone]  Fax: [phone]              Ashley Johansen  1965    Encounter Date: 7/28/2021    Krystina Nation M.D.          PROGRESS NOTE:  Subjective:   Chief Complaint:   Chief Complaint   Patient presents with   • Chest Pain     F/V Dx: Other chest pain   • Palpitations   • Atrial Fibrillation     F/V Dx: PAF (paroxysmal atrial fibrillation) (HCC)       \"Ashley\" Jessie Johansen is a 55 y.o. female who returns for atypical chest pain, EVRONICA, palpitations, PAF and SVT, LINA/CPAP.    She has been waking up at night, having discomfort in lower part of left breast, notes heart is racing, sits up, takes deep breaths, happening over the past few weeks.    Was feeling very lightheaded the next day and chest felt tight, radiated to throat.  Feels like it is racing quickly, not sure if it was irregular.    Zio with fast SVT and afib in sleep.  Tries to walk, gets VERONICA and fluttering, at times feels like her heart rate cannot get up to speed. Then gets CP that takes a while to go away with rest.    Echo was normal, normal size LA.  [id number] of 1, ASA only.  TSH ok     Has LINA, saw Dr. Coreas, CPAP    She has an inhaler, gets reactive airway but not asthma, not prior asthma, only in [place].    Has GERD- EGD and C scope done.  Has hiatal hernia and ulcer, started PPI.    She previously had episodes of left upper chest and shoulder tightness which sometimes radiates to the upper arm. It has happened while driving when she is stressed, doing dishes and at rest. It typically resolve spontaneously after 5 minutes. She had a nuclear stress test in 2017 which was normal. She exercised on the treadmill.      She has some mild dyspnea on exertion which is intermittent and sometimes some mild shortness of breath when lying down.   She notes mild lower extremity edema particularly after " exercise.   Echo normal, normal IVC.    Has very mildly elevated LDL of 103.  HDL protective.  No hypertension.  Lifelong nonsmoker.  No diabetes.   She is adopted on her father's side and does not know her father's biologic history.     She has never had syncope.   She does not get dizzy or lightheaded.    Lives in [place] with her .  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  Has been walking,  at the gym.    Not vaccinated yet. She is considering.    DATA REVIEWED by me:  ECG 3-  Sinus, 83, early R wave progression, normal variant    ECG April 25, 2018  NSR, 62, normal    ZioPatch Summary: 4-  1. Sinus rhythm with appropriate heart rate range. Average 86, range 46 to 250 bpm.   2. Normal sinus node and AV node conduction normal. No pauses.   3. Rare PACs.   4. Rare PVCs.   5. Paroxysmal atrial fibrillation, <1% burden, up to 7.5 minutes. Frequent SVT, up to 2 mins, up to 250 bpm.   6. 125 pt triggers during sinus, PAF, SVT, PVCs. Symptoms reported include racing, fluttering.     Conclusion: Sinus with frequent symptomatic fast SVT, paroxsymal atrial fibrillation.     Echo 4-  No prior study is available for comparison.   Left ventricular ejection fraction is visually estimated to be 65%.  Normal left atrial size.  No significant valve or doppler abnormality.       Nuclear stress test January 24, 2017  Exercised 6 minutes and 50 seconds achieving 7 METS, no concerning issues  Normal perfusion    Most recent labs:     Lab Results   Component Value Date/Time    HEMOGLOBIN 13.6 10/16/2019 09:00 PM    HEMOGLOBIN 11.8 (L) 02/25/2014 04:25 PM    HEMATOCRIT 40.4 10/16/2019 09:00 PM    HEMATOCRIT 35.2 (L) 02/25/2014 04:25 PM    MCV 92 10/16/2019 09:00 PM    MCV 90.7 02/25/2014 04:25 PM    TSH 1.720 10/16/2019 09:00 PM      Lab Results   Component Value Date/Time    SODIUM 144 10/16/2019 09:00 PM    SODIUM 138 02/25/2014 04:25 PM    POTASSIUM 4.0 10/16/2019 09:00 PM    POTASSIUM 3.8  02/25/2014 04:25 PM    CHLORIDE 108 (H) 10/16/2019 09:00 PM    CHLORIDE 106 02/25/2014 04:25 PM    CO2 22 10/16/2019 09:00 PM    CO2 24 02/25/2014 04:25 PM    GLUCOSE 99 10/16/2019 09:00 PM    GLUCOSE 87 02/25/2014 04:25 PM    BUN 14 10/16/2019 09:00 PM    BUN 13 02/25/2014 04:25 PM    CREATININE 0.94 10/16/2019 09:00 PM    CREATININE 0.80 02/25/2014 04:25 PM      Lab Results   Component Value Date/Time    ASTSGOT 12 10/16/2019 09:00 PM    ASTSGOT 10 (L) 02/25/2014 04:25 PM    ALTSGPT 14 10/16/2019 09:00 PM    ALTSGPT 8 02/25/2014 04:25 PM    ALBUMIN 4.4 10/16/2019 09:00 PM    ALBUMIN 4.3 02/25/2014 04:25 PM      Lab Results   Component Value Date/Time    CHOLSTRLTOT 168 02/24/2012 09:58 AM     02/24/2012 09:58 AM    HDL 52 02/24/2012 09:58 AM    TRIGLYCERIDE 63 02/24/2012 09:58 AM       Last blood work available to me is from February 2014, creatinine was 0.8, potassium was 3.8, LFTs were normal  Last lipid panel from 2012 revealed total cholesterol 168, Trig 63, HDL 52,     Past Medical History:   Diagnosis Date   • Allergy    • Anesthesia     clautraphobic unable to tolerated mask   • Apnea, sleep    • Arthritis     hands and feet   • Daytime sleepiness     sometimes   • Gasping for breath    • Hemorrhoids    • Indigestion    • Infectious disease 5/2014    step throat   • Insomnia    • Migraines    • Morning headache    • PAF (paroxysmal atrial fibrillation) ([facility]) 6/24/2020   • Pleurisy      Past Surgical History:   Procedure Laterality Date   • HYSTEROSCOPY NOVASURE-2  6/17/2014    Performed by [facility] Thomason M.D. at SURGERY SAME DAY [facility]   • TUBAL COAGULATION LAPAROSCOPIC BILATERAL  1998 1998   • APPENDECTOMY     • BOWEL RESECTION     • CYSTECTOMY     • EXPLORATORY LAPAROTOMY      for surgical adhesions, polyps   • HEMORRHOIDECTOMY     • PRIMARY C SECTION       Family History   Adopted: Yes   Problem Relation Age of Onset   • Other Mother         hypothyroid   • Diabetes Mother    •  Hypertension Mother    • Hyperlipidemia Mother    • Thyroid Mother    • No Known Problems Father         Adopted by father   • No Known Problems Maternal Grandmother    • No Known Problems Maternal Grandfather    • No Known Problems Sister         Half sister     Social History     Socioeconomic History   • Marital status:      Spouse name: Not on file   • Number of children: Not on file   • Years of education: Not on file   • Highest education level: Not on file   Occupational History   • Not on file   Tobacco Use   • Smoking status: Former Smoker     Packs/day: 0.50     Years: 6.00     Pack years: 3.00     Types: Cigarettes     Quit date: 1996     Years since quittin.5   • Smokeless tobacco: Never Used   Vaping Use   • Vaping Use: Never used   Substance and Sexual Activity   • Alcohol use: Yes     Comment: occ   • Drug use: No   • Sexual activity: Yes   Other Topics Concern   • Not on file   Social History Narrative   • Not on file     Social Determinants of Health     Financial Resource Strain:    • Difficulty of Paying Living Expenses:    Food Insecurity:    • Worried About Running Out of Food in the Last Year:    • Ran Out of Food in the Last Year:    Transportation Needs:    • Lack of Transportation (Medical):    • Lack of Transportation (Non-Medical):    Physical Activity:    • Days of Exercise per Week:    • Minutes of Exercise per Session:    Stress:    • Feeling of Stress :    Social Connections:    • Frequency of Communication with Friends and Family:    • Frequency of Social Gatherings with Friends and Family:    • Attends Orthodox Services:    • Active Member of Clubs or Organizations:    • Attends Club or Organization Meetings:    • Marital Status:    Intimate Partner Violence:    • Fear of Current or Ex-Partner:    • Emotionally Abused:    • Physically Abused:    • Sexually Abused:      Allergies   Allergen Reactions   • Codeine Vomiting and Swelling   • Other Environmental Hives      "Kiwi fruit       Current Outpatient Medications   Medication Sig Dispense Refill   • aspirin EC (ECOTRIN) 81 MG Tablet Delayed Response Take 1 tablet by mouth every day. 30 tablet    • flecainide (TAMBOCOR) 50 MG tablet Take 1 tablet by mouth 2 times a day. 180 tablet 3   • metoprolol SR (TOPROL XL) 25 MG TABLET SR 24 HR Take 0.5 Tablets by mouth every day. 45 tablet 3   • sumatriptan (IMITREX) 100 MG tablet Take 100 mg by mouth Once PRN for Migraine.     • albuterol 108 (90 Base) MCG/ACT Aero Soln inhalation aerosol Inhale 2 Puffs by mouth every four hours as needed (wheezing). 1 Inhaler 0   • Esomeprazole Magnesium (NEXIUM) 20 MG Pack Take  by mouth.       No current facility-administered medications for this visit.       ROS    All others systems reviewed and negative.     Objective:     /64 (BP Location: Left arm, Patient Position: Sitting, BP Cuff Size: Adult)   Pulse 66   Resp 12   Ht 1.549 m (5' 1\")   Wt 93.9 kg (207 lb)   SpO2 98%  Body mass index is 39.11 kg/m².    General: No acute distress. Well nourished.  HEENT: EOM grossly intact, no scleral icterus, no pharyngeal erythema.   Neck:  No JVD, no bruits, trachea midline  CVS: RRR. Normal S1, S2. No M/R/G. No LE edema.  2+ radial pulses, 2+ PT pulses  Resp: CTAB. No wheezing or crackles/rhonchi. Normal respiratory effort.  Abdomen: Soft, NT, no marija hepatomegaly.  MSK/Ext: No clubbing or cyanosis.  Skin: Warm and dry, no rashes.  Neurological: CN III-XII grossly intact. No focal deficits.   Psych: A&O x 3, appropriate affect, good judgement          Assessment:     1. PAF (paroxysmal atrial fibrillation) (HCC)  REFERRAL TO CARDIOLOGY    Basic Metabolic Panel   2. Palpitations     3. Dyspnea on exertion     4. Other chest pain     5. SVT (supraventricular tachycardia) (HCC)     6. Lightheaded     7. Mild intermittent reactive airway disease without complication     8. LINA (obstructive sleep apnea)     9. LINA on CPAP     10. High risk medication " use  Basic Metabolic Panel       Medical Decision Making:  Today's Assessment / Status / Plan:     -Symptomatic PAF and SVT, still having symptoms and not doing great on beta-blocker  -Neck step would be flecainide, cardiovascular risk is low so I do not think she needs a stress test, see below  -Referral to EP for consideration of ablation.  -I am avoiding calcium channel blockers because of the leg edema which does not appear to be her heart  -Has stomach ulcer, on ASA now, has been on nexium, may need to consider referral back to GI or alternative. Stay on PPI.  -CHADS2 vasc is 1, no prior TIA/CVA.  Aspirin 81 mg only.  -She is being adequately treated for sleep apnea  -Flecainide is a high risk medication requiring annual ecg, annual electrolytes,, BMP today, not fasting  -RTC 3 months    Written instructions given today:      -Metoprolol does not seem to control your symptoms well enough and I believe you are having side effects.    -The neck step is to try flecainide, we will start with 50 mg a.m. and p.m.    -The maximum dose of flecainide is 150 mg a.m. and p.m.  If you are still having breakthrough symptoms after 1 month of being on the 50 mg, let me know and I will tell you to increase your dose to 100 mg a.m. and p.m.    -If you feel worse for any reason, let me know.  Side effects can include fatigue and slower heart rate.  This is not likely to happen but it is possible.    -I am changing her metoprolol to a metoprolol called metoprolol succinate which is long-acting, take a half a pill daily which is 12.5 mg.  This will lower your overall total daily dose.    -At this time, I would avoid a diuretic for the legs, I think it would just dehydrate you.    -See one of my electrophysiology partners in [place] to discuss ablation as a future plan if you cannot tolerate the medications.  I have made the referral.        -[email or web address]  -Things that help, weight loss, avoid alcohol, avoid caffeine until you know  how it affects you    Return in about 3 months (around 10/28/2021).    It is my pleasure to participate in the care of Ms. Johansen.  Please do not hesitate to contact me with questions or concerns.    Krystina Nation MD, [facility]  Cardiologist [facility]    Please note that this dictation was created using voice recognition software. I have made every reasonable attempt to correct obvious errors, but it is possible there are errors of grammar and possibly content that I did not discover before finalizing the note.        Gilda Richards, A.P.R.N.  [address]  [address]  [place] [id number]  Via In Basket

## 2021-07-28 NOTE — PATIENT INSTRUCTIONS
-Metoprolol does not seem to control your symptoms well enough and I believe you are having side effects.    -The neck step is to try flecainide, we will start with 50 mg a.m. and p.m.    -The maximum dose of flecainide is 150 mg a.m. and p.m.  If you are still having breakthrough symptoms after 1 month of being on the 50 mg, let me know and I will tell you to increase your dose to 100 mg a.m. and p.m.    -If you feel worse for any reason, let me know.  Side effects can include fatigue and slower heart rate.  This is not likely to happen but it is possible.    -I am changing her metoprolol to a metoprolol called metoprolol succinate which is long-acting, take a half a pill daily which is 12.5 mg.  This will lower your overall total daily dose.    -At this time, I would avoid a diuretic for the legs, I think it would just dehydrate you.    -See one of my electrophysiology partners in [place] to discuss ablation as a future plan if you cannot tolerate the medications.  I have made the referral.

## 2021-07-28 NOTE — PROGRESS NOTES
"Subjective:   Chief Complaint:   Chief Complaint   Patient presents with   • Chest Pain     F/V Dx: Other chest pain   • Palpitations   • Atrial Fibrillation     F/V Dx: PAF (paroxysmal atrial fibrillation) (HCC)       \"Ashley\" Jessie Johansen is a 55 y.o. female who returns for atypical chest pain, VERONICA, palpitations, PAF and SVT, LINA/CPAP.    She has been waking up at night, having discomfort in lower part of left breast, notes heart is racing, sits up, takes deep breaths, happening over the past few weeks.    Was feeling very lightheaded the next day and chest felt tight, radiated to throat.  Feels like it is racing quickly, not sure if it was irregular.    Zio with fast SVT and afib in sleep.  Tries to walk, gets VERONICA and fluttering, at times feels like her heart rate cannot get up to speed. Then gets CP that takes a while to go away with rest.    Echo was normal, normal size LA.  [id number] of 1, ASA only.  TSH ok     Has LINA, saw Dr. Coreas, CPAP    She has an inhaler, gets reactive airway but not asthma, not prior asthma, only in [place].    Has GERD- EGD and C scope done.  Has hiatal hernia and ulcer, started PPI.    She previously had episodes of left upper chest and shoulder tightness which sometimes radiates to the upper arm. It has happened while driving when she is stressed, doing dishes and at rest. It typically resolve spontaneously after 5 minutes. She had a nuclear stress test in 2017 which was normal. She exercised on the treadmill.      She has some mild dyspnea on exertion which is intermittent and sometimes some mild shortness of breath when lying down.   She notes mild lower extremity edema particularly after exercise.   Echo normal, normal IVC.    Has very mildly elevated LDL of 103.  HDL protective.  No hypertension.  Lifelong nonsmoker.  No diabetes.   She is adopted on her father's side and does not know her father's biologic history.     She has never had syncope.   She does not get dizzy or " lightheaded.    Lives in [place] with her .  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  Has been walking,  at the gym.    Not vaccinated yet. She is considering.    DATA REVIEWED by me:  ECG 3-  Sinus, 83, early R wave progression, normal variant    ECG April 25, 2018  NSR, 62, normal    ZioPatch Summary: 4-  1. Sinus rhythm with appropriate heart rate range. Average 86, range 46 to 250 bpm.   2. Normal sinus node and AV node conduction normal. No pauses.   3. Rare PACs.   4. Rare PVCs.   5. Paroxysmal atrial fibrillation, <1% burden, up to 7.5 minutes. Frequent SVT, up to 2 mins, up to 250 bpm.   6. 125 pt triggers during sinus, PAF, SVT, PVCs. Symptoms reported include racing, fluttering.     Conclusion: Sinus with frequent symptomatic fast SVT, paroxsymal atrial fibrillation.     Echo 4-  No prior study is available for comparison.   Left ventricular ejection fraction is visually estimated to be 65%.  Normal left atrial size.  No significant valve or doppler abnormality.       Nuclear stress test January 24, 2017  Exercised 6 minutes and 50 seconds achieving 7 METS, no concerning issues  Normal perfusion    Most recent labs:     Lab Results   Component Value Date/Time    HEMOGLOBIN 13.6 10/16/2019 09:00 PM    HEMOGLOBIN 11.8 (L) 02/25/2014 04:25 PM    HEMATOCRIT 40.4 10/16/2019 09:00 PM    HEMATOCRIT 35.2 (L) 02/25/2014 04:25 PM    MCV 92 10/16/2019 09:00 PM    MCV 90.7 02/25/2014 04:25 PM    TSH 1.720 10/16/2019 09:00 PM      Lab Results   Component Value Date/Time    SODIUM 144 10/16/2019 09:00 PM    SODIUM 138 02/25/2014 04:25 PM    POTASSIUM 4.0 10/16/2019 09:00 PM    POTASSIUM 3.8 02/25/2014 04:25 PM    CHLORIDE 108 (H) 10/16/2019 09:00 PM    CHLORIDE 106 02/25/2014 04:25 PM    CO2 22 10/16/2019 09:00 PM    CO2 24 02/25/2014 04:25 PM    GLUCOSE 99 10/16/2019 09:00 PM    GLUCOSE 87 02/25/2014 04:25 PM    BUN 14 10/16/2019 09:00 PM    BUN 13 02/25/2014 04:25 PM     CREATININE 0.94 10/16/2019 09:00 PM    CREATININE 0.80 02/25/2014 04:25 PM      Lab Results   Component Value Date/Time    ASTSGOT 12 10/16/2019 09:00 PM    ASTSGOT 10 (L) 02/25/2014 04:25 PM    ALTSGPT 14 10/16/2019 09:00 PM    ALTSGPT 8 02/25/2014 04:25 PM    ALBUMIN 4.4 10/16/2019 09:00 PM    ALBUMIN 4.3 02/25/2014 04:25 PM      Lab Results   Component Value Date/Time    CHOLSTRLTOT 168 02/24/2012 09:58 AM     02/24/2012 09:58 AM    HDL 52 02/24/2012 09:58 AM    TRIGLYCERIDE 63 02/24/2012 09:58 AM       Last blood work available to me is from February 2014, creatinine was 0.8, potassium was 3.8, LFTs were normal  Last lipid panel from 2012 revealed total cholesterol 168, Trig 63, HDL 52,     Past Medical History:   Diagnosis Date   • Allergy    • Anesthesia     clautraphobic unable to tolerated mask   • Apnea, sleep    • Arthritis     hands and feet   • Daytime sleepiness     sometimes   • Gasping for breath    • Hemorrhoids    • Indigestion    • Infectious disease 5/2014    step throat   • Insomnia    • Migraines    • Morning headache    • PAF (paroxysmal atrial fibrillation) ([facility]) 6/24/2020   • Pleurisy      Past Surgical History:   Procedure Laterality Date   • HYSTEROSCOPY NOVASURE-2  6/17/2014    Performed by [facility] Thomason M.D. at SURGERY SAME DAY [facility] ORS   • TUBAL COAGULATION LAPAROSCOPIC BILATERAL  1998 1998   • APPENDECTOMY     • BOWEL RESECTION     • CYSTECTOMY     • EXPLORATORY LAPAROTOMY      for surgical adhesions, polyps   • HEMORRHOIDECTOMY     • PRIMARY C SECTION       Family History   Adopted: Yes   Problem Relation Age of Onset   • Other Mother         hypothyroid   • Diabetes Mother    • Hypertension Mother    • Hyperlipidemia Mother    • Thyroid Mother    • No Known Problems Father         Adopted by father   • No Known Problems Maternal Grandmother    • No Known Problems Maternal Grandfather    • No Known Problems Sister         Half sister     Social History      Socioeconomic History   • Marital status:      Spouse name: Not on file   • Number of children: Not on file   • Years of education: Not on file   • Highest education level: Not on file   Occupational History   • Not on file   Tobacco Use   • Smoking status: Former Smoker     Packs/day: 0.50     Years: 6.00     Pack years: 3.00     Types: Cigarettes     Quit date: 1996     Years since quittin.5   • Smokeless tobacco: Never Used   Vaping Use   • Vaping Use: Never used   Substance and Sexual Activity   • Alcohol use: Yes     Comment: occ   • Drug use: No   • Sexual activity: Yes   Other Topics Concern   • Not on file   Social History Narrative   • Not on file     Social Determinants of Health     Financial Resource Strain:    • Difficulty of Paying Living Expenses:    Food Insecurity:    • Worried About Running Out of Food in the Last Year:    • Ran Out of Food in the Last Year:    Transportation Needs:    • Lack of Transportation (Medical):    • Lack of Transportation (Non-Medical):    Physical Activity:    • Days of Exercise per Week:    • Minutes of Exercise per Session:    Stress:    • Feeling of Stress :    Social Connections:    • Frequency of Communication with Friends and Family:    • Frequency of Social Gatherings with Friends and Family:    • Attends Yaz[employer] Services:    • Active Member of Clubs or Organizations:    • Attends Club or Organization Meetings:    • Marital Status:    Intimate Partner Violence:    • Fear of Current or Ex-Partner:    • Emotionally Abused:    • Physically Abused:    • Sexually Abused:      Allergies   Allergen Reactions   • Codeine Vomiting and Swelling   • Other Environmental Hives     Kiwi fruit       Current Outpatient Medications   Medication Sig Dispense Refill   • aspirin EC (ECOTRIN) 81 MG Tablet Delayed Response Take 1 tablet by mouth every day. 30 tablet    • flecainide (TAMBOCOR) 50 MG tablet Take 1 tablet by mouth 2 times a day. 180 tablet 3   •  "metoprolol SR (TOPROL XL) 25 MG TABLET SR 24 HR Take 0.5 Tablets by mouth every day. 45 tablet 3   • sumatriptan (IMITREX) 100 MG tablet Take 100 mg by mouth Once PRN for Migraine.     • albuterol 108 (90 Base) MCG/ACT Aero Soln inhalation aerosol Inhale 2 Puffs by mouth every four hours as needed (wheezing). 1 Inhaler 0   • Esomeprazole Magnesium (NEXIUM) 20 MG Pack Take  by mouth.       No current facility-administered medications for this visit.       ROS    All others systems reviewed and negative.     Objective:     /64 (BP Location: Left arm, Patient Position: Sitting, BP Cuff Size: Adult)   Pulse 66   Resp 12   Ht 1.549 m (5' 1\")   Wt 93.9 kg (207 lb)   SpO2 98%  Body mass index is 39.11 kg/m².    General: No acute distress. Well nourished.  HEENT: EOM grossly intact, no scleral icterus, no pharyngeal erythema.   Neck:  No JVD, no bruits, trachea midline  CVS: RRR. Normal S1, S2. No M/R/G. No LE edema.  2+ radial pulses, 2+ PT pulses  Resp: CTAB. No wheezing or crackles/rhonchi. Normal respiratory effort.  Abdomen: Soft, NT, no marija hepatomegaly.  MSK/Ext: No clubbing or cyanosis.  Skin: Warm and dry, no rashes.  Neurological: CN III-XII grossly intact. No focal deficits.   Psych: A&O x 3, appropriate affect, good judgement          Assessment:     1. PAF (paroxysmal atrial fibrillation) ([facility])  REFERRAL TO CARDIOLOGY    Basic Metabolic Panel   2. Palpitations     3. Dyspnea on exertion     4. Other chest pain     5. SVT (supraventricular tachycardia) ([facility])     6. Lightheaded     7. Mild intermittent reactive airway disease without complication     8. LINA (obstructive sleep apnea)     9. LINA on CPAP     10. High risk medication use  Basic Metabolic Panel       Medical Decision Making:  Today's Assessment / Status / Plan:     -Symptomatic PAF and SVT, still having symptoms and not doing great on beta-blocker  -Neck step would be flecainide, cardiovascular risk is low so I do not think she needs a " stress test, see below  -Referral to EP for consideration of ablation.  -I am avoiding calcium channel blockers because of the leg edema which does not appear to be her heart  -Has stomach ulcer, on ASA now, has been on nexium, may need to consider referral back to GI or alternative. Stay on PPI.  -CHADS2 vasc is 1, no prior TIA/CVA.  Aspirin 81 mg only.  -She is being adequately treated for sleep apnea  -Flecainide is a high risk medication requiring annual ecg, annual electrolytes,, BMP today, not fasting  -RTC 3 months    Written instructions given today:      -Metoprolol does not seem to control your symptoms well enough and I believe you are having side effects.    -The neck step is to try flecainide, we will start with 50 mg a.m. and p.m.    -The maximum dose of flecainide is 150 mg a.m. and p.m.  If you are still having breakthrough symptoms after 1 month of being on the 50 mg, let me know and I will tell you to increase your dose to 100 mg a.m. and p.m.    -If you feel worse for any reason, let me know.  Side effects can include fatigue and slower heart rate.  This is not likely to happen but it is possible.    -I am changing her metoprolol to a metoprolol called metoprolol succinate which is long-acting, take a half a pill daily which is 12.5 mg.  This will lower your overall total daily dose.    -At this time, I would avoid a diuretic for the legs, I think it would just dehydrate you.    -See one of my electrophysiology partners in [place] to discuss ablation as a future plan if you cannot tolerate the medications.  I have made the referral.        -[email or web address]  -Things that help, weight loss, avoid alcohol, avoid caffeine until you know how it affects you    Return in about 3 months (around 10/28/2021).    It is my pleasure to participate in the care of Ms. Johansen.  Please do not hesitate to contact me with questions or concerns.    Krystina Nation MD, [facility]  Cardiologist [facility]    Please note that this dictation was created using voice recognition software. I have made every reasonable attempt to correct obvious errors, but it is possible there are errors of grammar and possibly content that I did not discover before finalizing the note.

## 2021-08-10 ENCOUNTER — TELEPHONE (OUTPATIENT)
Dept: CARDIOLOGY | Facility: MEDICAL CENTER | Age: 56
End: 2021-08-10

## 2021-08-10 NOTE — TELEPHONE ENCOUNTER
----- Message -----  From: Ashley Romna Eleno  Sent: 8/10/2021   8:27 AM PDT  To: Amy Prakash  Subject: Prescription Question                            I went to [facility] to  my prescription the day after my visits and they said they could not give me the metroprol succinate because it could not be cut in half and they were waiting for a reply from the doctors office. I have not been able to start my new medicine so I was wondering what to do because I am afraid of running out of the current medication I am taking. My current medicine is metoprolol tartrate.  ------------------------------------------------------------------------    Cutting metoprolol succinate in half does not effect the extended release and Dr. Nation specifically said to take half a pill a day of metoprolol.    We have not received any calls from [facility]. Called [facility] this morning several times and was not able to get through to anyone, there seems to be something wrong with their phone system. Was able to leave a detailed message on their provider voice mail advising to please dispense the metoprolol, providing a verbal order.     Pt notified via [employer]. Pt to let us know if she continue to have trouble picking up the prescription.

## 2021-08-13 ENCOUNTER — OFFICE VISIT (OUTPATIENT)
Dept: CARDIOLOGY | Facility: MEDICAL CENTER | Age: 56
End: 2021-08-13
Payer: COMMERCIAL

## 2021-08-13 VITALS
OXYGEN SATURATION: 95 % | BODY MASS INDEX: 39.08 KG/M2 | WEIGHT: 207 LBS | SYSTOLIC BLOOD PRESSURE: 104 MMHG | DIASTOLIC BLOOD PRESSURE: 76 MMHG | HEART RATE: 98 BPM | HEIGHT: 61 IN

## 2021-08-13 DIAGNOSIS — I48.0 PAF (PAROXYSMAL ATRIAL FIBRILLATION) (HCC): ICD-10-CM

## 2021-08-13 LAB — EKG IMPRESSION: NORMAL

## 2021-08-13 PROCEDURE — 99214 OFFICE O/P EST MOD 30 MIN: CPT | Performed by: INTERNAL MEDICINE

## 2021-08-13 PROCEDURE — 93000 ELECTROCARDIOGRAM COMPLETE: CPT | Performed by: INTERNAL MEDICINE

## 2021-08-13 ASSESSMENT — FIBROSIS 4 INDEX: FIB4 SCORE: 0.61

## 2021-08-13 NOTE — PROGRESS NOTES
Arrhythmia Clinic Note (New patient)     DOS: 8/13/2021    Referring physician: Dr Nation    Chief complaint/Reason for consult: Afib    HPI: 54 y/o F with pAF. She has been managed on metoprolol. Still symptomatic. Issues with persistent weight gain. Low dose metoprolol. Recently prescribed flecainide but has not started taking. Has intermittent palpitations and chest pressure, shortness of breath. Normal stress test. She is a teacher.     ROS (+ highlighted in bold):  Constitutional: Fevers/chills/fatigue/weightloss  HEENT: Blurry vision/eye pain/sore throat/hearing loss  Respiratory: Shortness of breath/cough  Cardiovascular: Chest pain/palpitations/edema/orthopnea/syncope  GI: Nausea/vomitting/diarrhea  MSK: Arthralgias/myagias/muscle weakness  Skin: Rash/sores  Neurological: Numbness/tremors/vertigo  Endocrine: Excessive thirst/polyuria/cold intolerance/heat intolerance  Psych: Depression/anxiety    Past Medical History:   Diagnosis Date   • Allergy    • Anesthesia     clautraphobic unable to tolerated mask   • Apnea, sleep    • Arthritis     hands and feet   • Daytime sleepiness     sometimes   • Gasping for breath    • Hemorrhoids    • Indigestion    • Infectious disease 5/2014    step throat   • Insomnia    • Migraines    • Morning headache    • PAF (paroxysmal atrial fibrillation) ([facility]) 6/24/2020   • Pleurisy        Past Surgical History:   Procedure Laterality Date   • HYSTEROSCOPY NOVASURE-2  6/17/2014    Performed by [facility] Thomason M.D. at SURGERY SAME DAY [facility] ORS   • TUBAL COAGULATION LAPAROSCOPIC BILATERAL  1998 1998   • APPENDECTOMY     • BOWEL RESECTION     • CYSTECTOMY     • EXPLORATORY LAPAROTOMY      for surgical adhesions, polyps   • HEMORRHOIDECTOMY     • PRIMARY C SECTION         Social History     Socioeconomic History   • Marital status:      Spouse name: Not on file   • Number of children: Not on file   • Years of education: Not on file   • Highest education level: Not on  file   Occupational History   • Not on file   Tobacco Use   • Smoking status: Former Smoker     Packs/day: 0.50     Years: 6.00     Pack years: 3.00     Types: Cigarettes     Quit date: 1996     Years since quittin.6   • Smokeless tobacco: Never Used   Vaping Use   • Vaping Use: Never used   Substance and Sexual Activity   • Alcohol use: Yes     Comment: occ   • Drug use: No   • Sexual activity: Yes   Other Topics Concern   • Not on file   Social History Narrative   • Not on file     Social Determinants of Health     Financial Resource Strain:    • Difficulty of Paying Living Expenses:    Food Insecurity:    • Worried About Running Out of Food in the Last Year:    • Ran Out of Food in the Last Year:    Transportation Needs:    • Lack of Transportation (Medical):    • Lack of Transportation (Non-Medical):    Physical Activity:    • Days of Exercise per Week:    • Minutes of Exercise per Session:    Stress:    • Feeling of Stress :    Social Connections:    • Frequency of Communication with Friends and Family:    • Frequency of Social Gatherings with Friends and Family:    • Attends [employer] Services:    • Active Member of Clubs or Organizations:    • Attends Club or Organization Meetings:    • Marital Status:    Intimate Partner Violence:    • Fear of Current or Ex-Partner:    • Emotionally Abused:    • Physically Abused:    • Sexually Abused:        Family History   Adopted: Yes   Problem Relation Age of Onset   • Other Mother         hypothyroid   • Diabetes Mother    • Hypertension Mother    • Hyperlipidemia Mother    • Thyroid Mother    • No Known Problems Father         Adopted by father   • No Known Problems Maternal Grandmother    • No Known Problems Maternal Grandfather    • No Known Problems Sister         Half sister       Allergies   Allergen Reactions   • Codeine Vomiting and Swelling   • Other Environmental Hives     Kiwi fruit       Current Outpatient Medications   Medication Sig Dispense  "Refill   • aspirin EC (ECOTRIN) 81 MG Tablet Delayed Response Take 1 tablet by mouth every day. 30 tablet    • metoprolol SR (TOPROL XL) 25 MG TABLET SR 24 HR Take 0.5 Tablets by mouth every day. (Patient taking differently: Take 12.5 mg by mouth every day. 1/2 po q am & 1/2 po q pm.) 45 tablet 3   • sumatriptan (IMITREX) 100 MG tablet Take 100 mg by mouth Once PRN for Migraine.     • albuterol 108 (90 Base) MCG/ACT Aero Soln inhalation aerosol Inhale 2 Puffs by mouth every four hours as needed (wheezing). 1 Inhaler 0   • Esomeprazole Magnesium (NEXIUM) 20 MG Pack Take  by mouth.     • flecainide (TAMBOCOR) 50 MG tablet Take 1 tablet by mouth 2 times a day. (Patient not taking: Reported on 8/13/2021) 180 tablet 3     No current facility-administered medications for this visit.       Physical Exam:  Vitals:    08/13/21 1403   BP: 104/76   BP Location: Left arm   Patient Position: Sitting   BP Cuff Size: Adult   Pulse: 98   SpO2: 95%   Weight: 93.9 kg (207 lb)   Height: 1.549 m (5' 1\")     General appearance: NAD, conversant   Eyes: anicteric sclerae, moist conjunctivae; no lid-lag; PERRLA  HENT: Atraumatic; oropharynx clear with moist mucous membranes and no mucosal ulcerations; normal hard and soft palate  Neck: Trachea midline; FROM, supple, no thyromegaly or lymphadenopathy  Lungs: CTA, with normal respiratory effort and no intercostal retractions  CV: RRR, no MRGs, no JVD   Abdomen: Soft, non-tender; no masses or HSM  Extremities: No peripheral edema or extremity lymphadenopathy  Skin: Normal temperature, turgor and texture; no rash, ulcers or subcutaneous nodules  Psych: Appropriate affect, alert and oriented to person, place and time    Data:  Lipids:   Lab Results   Component Value Date/Time    CHOLSTRLTOT 168 02/24/2012 09:58 AM    TRIGLYCERIDE 63 02/24/2012 09:58 AM    HDL 52 02/24/2012 09:58 AM     02/24/2012 09:58 AM        BMP:  Lab Results   Component Value Date/Time    SODIUM 139 07/28/2021 1258 "    POTASSIUM 4.0 07/28/2021 1258    CHLORIDE 104 07/28/2021 1258    CO2 24 07/28/2021 1258    GLUCOSE 98 07/28/2021 1258    BUN 15 07/28/2021 1258    CREATININE 0.80 07/28/2021 1258    CALCIUM 8.8 07/28/2021 1258    ANION 11.0 07/28/2021 1258        TSH:   Lab Results   Component Value Date/Time    TSHULTRASEN 1.270 02/24/2012 0958        THYROXINE (T4):   Lab Results   Component Value Date/Time    FREEDIR 1.15 10/16/2019 2100        CBC:   Lab Results   Component Value Date/Time    WBC 7.4 10/16/2019 09:00 PM    WBC 9.0 02/25/2014 04:25 PM    RBC 4.40 10/16/2019 09:00 PM    RBC 3.88 (L) 02/25/2014 04:25 PM    HEMOGLOBIN 13.6 10/16/2019 09:00 PM    HEMOGLOBIN 11.8 (L) 02/25/2014 04:25 PM    HEMATOCRIT 40.4 10/16/2019 09:00 PM    HEMATOCRIT 35.2 (L) 02/25/2014 04:25 PM    MCV 92 10/16/2019 09:00 PM    MCV 90.7 02/25/2014 04:25 PM    MCH 30.9 10/16/2019 09:00 PM    MCH 30.3 02/25/2014 04:25 PM    MCHC 33.7 10/16/2019 09:00 PM    MCHC 33.4 02/25/2014 04:25 PM    RDW 13.2 10/16/2019 09:00 PM    RDW 15.0 02/25/2014 04:25 PM    PLATELETCT 282 10/16/2019 09:00 PM    PLATELETCT 262 02/25/2014 04:25 PM    MPV 9.2 02/25/2014 04:25 PM    NEUTSPOLYS 63 10/16/2019 09:00 PM    NEUTSPOLYS 68.0 02/25/2014 04:25 PM    LYMPHOCYTES 27 10/16/2019 09:00 PM    LYMPHOCYTES 23.2 02/25/2014 04:25 PM    MONOCYTES 9 10/16/2019 09:00 PM    MONOCYTES 7.8 02/25/2014 04:25 PM    EOSINOPHILS 1 10/16/2019 09:00 PM    EOSINOPHILS 0.6 02/25/2014 04:25 PM    BASOPHILS 0 10/16/2019 09:00 PM    BASOPHILS 0.4 02/25/2014 04:25 PM    IMMGRAN 0 10/16/2019 09:00 PM    NRBC CANCELED 10/16/2019 09:00 PM    NEUTS 4.7 10/16/2019 09:00 PM    LYMPHS 2.0 10/16/2019 09:00 PM    MONOS 0.6 10/16/2019 09:00 PM    EOS 0.1 10/16/2019 09:00 PM    BASO 0.0 10/16/2019 09:00 PM    IMMGRANAB 0.0 10/16/2019 09:00 PM        CBC w/o DIFF  Lab Results   Component Value Date/Time    WBC 7.4 10/16/2019 09:00 PM    WBC 9.0 02/25/2014 04:25 PM    RBC 4.40 10/16/2019 09:00 PM    RBC  3.88 (L) 02/25/2014 04:25 PM    HEMOGLOBIN 13.6 10/16/2019 09:00 PM    HEMOGLOBIN 11.8 (L) 02/25/2014 04:25 PM    MCV 92 10/16/2019 09:00 PM    MCV 90.7 02/25/2014 04:25 PM    MCH 30.9 10/16/2019 09:00 PM    MCH 30.3 02/25/2014 04:25 PM    MCHC 33.7 10/16/2019 09:00 PM    MCHC 33.4 02/25/2014 04:25 PM    RDW 13.2 10/16/2019 09:00 PM    RDW 15.0 02/25/2014 04:25 PM    MPV 9.2 02/25/2014 04:25 PM       Prior echo/stress results reviewed: Normal EF    EKG interpreted by me: NSR    Impression/Plan:  1. PAF (paroxysmal atrial fibrillation) (HCC)  EKG     1. pAF  2. Encounter for high risk medication management, flecainide    -[id number] is 1 owing only to female, continue ASA  -Start flecainide 50mg BID. Continue Metoprolol. Echo and stress normal.  -Can increase flecainide if needed. Also discussed ablation if flecainide not working or causing side effects.    FV in 6 months, sooner if needed.    Tim Paz MD  Cardiac Electrophysiology

## 2021-08-31 ENCOUNTER — HOSPITAL ENCOUNTER (OUTPATIENT)
Facility: MEDICAL CENTER | Age: 56
End: 2021-08-31
Attending: FAMILY MEDICINE
Payer: COMMERCIAL

## 2021-08-31 ENCOUNTER — OFFICE VISIT (OUTPATIENT)
Dept: URGENT CARE | Facility: PHYSICIAN GROUP | Age: 56
End: 2021-08-31
Payer: COMMERCIAL

## 2021-08-31 VITALS
WEIGHT: 204 LBS | HEART RATE: 84 BPM | BODY MASS INDEX: 38.51 KG/M2 | SYSTOLIC BLOOD PRESSURE: 124 MMHG | HEIGHT: 61 IN | OXYGEN SATURATION: 97 % | DIASTOLIC BLOOD PRESSURE: 82 MMHG | RESPIRATION RATE: 18 BRPM | TEMPERATURE: 97.6 F

## 2021-08-31 DIAGNOSIS — Z11.52 ENCOUNTER FOR SCREENING FOR COVID-19: ICD-10-CM

## 2021-08-31 DIAGNOSIS — R09.81 NASAL CONGESTION: ICD-10-CM

## 2021-08-31 DIAGNOSIS — J22 ACUTE RESPIRATORY INFECTION: ICD-10-CM

## 2021-08-31 DIAGNOSIS — J34.89 RHINORRHEA: ICD-10-CM

## 2021-08-31 DIAGNOSIS — R05.9 COUGH: ICD-10-CM

## 2021-08-31 PROCEDURE — U0003 INFECTIOUS AGENT DETECTION BY NUCLEIC ACID (DNA OR RNA); SEVERE ACUTE RESPIRATORY SYNDROME CORONAVIRUS 2 (SARS-COV-2) (CORONAVIRUS DISEASE [COVID-19]), AMPLIFIED PROBE TECHNIQUE, MAKING USE OF HIGH THROUGHPUT TECHNOLOGIES AS DESCRIBED BY CMS-2020-01-R: HCPCS

## 2021-08-31 PROCEDURE — 99214 OFFICE O/P EST MOD 30 MIN: CPT | Performed by: FAMILY MEDICINE

## 2021-08-31 PROCEDURE — U0005 INFEC AGEN DETEC AMPLI PROBE: HCPCS

## 2021-08-31 RX ORDER — AZITHROMYCIN 250 MG/1
TABLET, FILM COATED ORAL
Qty: 6 TABLET | Refills: 0 | Status: SHIPPED | OUTPATIENT
Start: 2021-08-31 | End: 2021-09-05

## 2021-08-31 RX ORDER — BENZONATATE 200 MG/1
CAPSULE ORAL
Qty: 30 CAPSULE | Refills: 0 | Status: SHIPPED | OUTPATIENT
Start: 2021-08-31 | End: 2021-11-10

## 2021-08-31 ASSESSMENT — FIBROSIS 4 INDEX: FIB4 SCORE: 0.61

## 2021-08-31 NOTE — PROGRESS NOTES
Chief Complaint:    Chief Complaint   Patient presents with   • Cough     tested for covid yesterday neg rapid test   • Congestion       History of Present Illness:    Symptoms since 22. Has nasal congestion, rhinorrhea, burning in chest, and cough productive of purulent mucus. Did rapid Covid test yesterday - negative.      Past Medical History:    Past Medical History:   Diagnosis Date   • Allergy    • Anesthesia     clautraphobic unable to tolerated mask   • Apnea, sleep    • Arthritis     hands and feet   • Daytime sleepiness     sometimes   • Gasping for breath    • Hemorrhoids    • Indigestion    • Infectious disease 2014    step throat   • Insomnia    • Migraines    • Morning headache    • PAF (paroxysmal atrial fibrillation) ([facility]) 2020   • Pleurisy      Past Surgical History:    Past Surgical History:   Procedure Laterality Date   • HYSTEROSCOPY NOVASURE-2  2014    Performed by [facility] Thomason M.D. at SURGERY SAME DAY [facility]   • TUBAL COAGULATION LAPAROSCOPIC BILATERAL  1998   • APPENDECTOMY     • BOWEL RESECTION     • CYSTECTOMY     • EXPLORATORY LAPAROTOMY      for surgical adhesions, polyps   • HEMORRHOIDECTOMY     • PRIMARY C SECTION       Social History:    Social History     Socioeconomic History   • Marital status:      Spouse name: Not on file   • Number of children: Not on file   • Years of education: Not on file   • Highest education level: Not on file   Occupational History   • Not on file   Tobacco Use   • Smoking status: Former Smoker     Packs/day: 0.50     Years: 6.00     Pack years: 3.00     Types: Cigarettes     Quit date: 1996     Years since quittin.6   • Smokeless tobacco: Never Used   Vaping Use   • Vaping Use: Never used   Substance and Sexual Activity   • Alcohol use: Yes     Comment: occ   • Drug use: No   • Sexual activity: Yes   Other Topics Concern   • Not on file   Social History Narrative   • Not on file     Social Determinants of  Health     Financial Resource Strain:    • Difficulty of Paying Living Expenses:    Food Insecurity:    • Worried About Running Out of Food in the Last Year:    • Ran Out of Food in the Last Year:    Transportation Needs:    • Lack of Transportation (Medical):    • Lack of Transportation (Non-Medical):    Physical Activity:    • Days of Exercise per Week:    • Minutes of Exercise per Session:    Stress:    • Feeling of Stress :    Social Connections:    • Frequency of Communication with Friends and Family:    • Frequency of Social Gatherings with Friends and Family:    • Attends Christian Services:    • Active Member of Clubs or Organizations:    • Attends Club or Organization Meetings:    • Marital Status:    Intimate Partner Violence:    • Fear of Current or Ex-Partner:    • Emotionally Abused:    • Physically Abused:    • Sexually Abused:      Family History:    Family History   Adopted: Yes   Problem Relation Age of Onset   • Other Mother         hypothyroid   • Diabetes Mother    • Hypertension Mother    • Hyperlipidemia Mother    • Thyroid Mother    • No Known Problems Father         Adopted by father   • No Known Problems Maternal Grandmother    • No Known Problems Maternal Grandfather    • No Known Problems Sister         Half sister     Medications:    Current Outpatient Medications on File Prior to Visit   Medication Sig Dispense Refill   • aspirin EC (ECOTRIN) 81 MG Tablet Delayed Response Take 1 tablet by mouth every day. 30 tablet    • flecainide (TAMBOCOR) 50 MG tablet Take 1 tablet by mouth 2 times a day. 180 tablet 3   • metoprolol SR (TOPROL XL) 25 MG TABLET SR 24 HR Take 0.5 Tablets by mouth every day. (Patient taking differently: Take 12.5 mg by mouth every day. 1/2 po q am & 1/2 po q pm.) 45 tablet 3   • sumatriptan (IMITREX) 100 MG tablet Take 100 mg by mouth Once PRN for Migraine.     • albuterol 108 (90 Base) MCG/ACT Aero Soln inhalation aerosol Inhale 2 Puffs by mouth every four hours as  "needed (wheezing). 1 Inhaler 0   • Esomeprazole Magnesium (NEXIUM) 20 MG Pack Take  by mouth.       No current facility-administered medications on file prior to visit.     Allergies:    Allergies   Allergen Reactions   • Codeine Vomiting and Swelling   • Other Environmental Hives     Kiwi fruit       Vitals:    Vitals:    08/31/21 1648   BP: 124/82   Pulse: 84   Resp: 18   Temp: 36.4 °C (97.6 °F)   SpO2: 97%   Weight: 92.5 kg (204 lb)   Height: 1.549 m (5' 1\")       Physical Exam:    Constitutional: Vital signs reviewed. Appears well-developed and well-nourished. Occl cough. No acute distress.   Eyes: Sclera white, conjunctivae clear.   ENT: External ears normal. External auditory canals normal without discharge. TMs translucent and non-bulging. Hearing normal. Nasal mucosa pink. Lips/teeth are normal. Oral mucosa pink and moist. Posterior pharynx: WNL.  Neck: Neck supple.   Cardiovascular: Regular rate and rhythm. No murmur.  Pulmonary/Chest: Respirations non-labored. Clear to auscultation bilaterally.  Musculoskeletal: Normal gait. Normal range of motion. No muscular atrophy or weakness.  Neurological: Alert and oriented to person, place, and time. Muscle tone normal. Coordination normal.   Skin: No rashes or lesions. Warm, dry, normal turgor.  Psychiatric: Normal mood and affect. Behavior is normal. Judgment and thought content normal.       Assessment / Plan:    1. Nasal congestion  - SARS-CoV-2 PCR (24 hour In-House): Collect NP swab in VTM; Future    2. Rhinorrhea  - SARS-CoV-2 PCR (24 hour In-House): Collect NP swab in VTM; Future    3. Cough  - SARS-CoV-2 PCR (24 hour In-House): Collect NP swab in VTM; Future  - benzonatate (TESSALON) 200 MG capsule; 1 CAP BY MOUTH UP TO 3 TIMES A DAY ONLY IF NEEDED FOR COUGH.  Dispense: 30 Capsule; Refill: 0    4. Acute respiratory infection  - azithromycin (ZITHROMAX) 250 MG Tab; 2 TABS BY MOUTH ON DAY 1, 1 TAB ON DAYS 2-5.  Dispense: 6 Tablet; Refill: 0    5. Encounter " for screening for COVID-19  - SARS-CoV-2 PCR (24 hour In-House): Collect NP swab in VTM; Future      Work note given - excuse for 8/30 thru and including 9/3/21.    Discussed with her DDX, management options, and risks, benefits, and alternatives to treatment plan agreed upon.    Symptoms since 8/25/21. Has nasal congestion, rhinorrhea, burning in chest, and cough productive of purulent mucus. Did rapid Covid test yesterday - negative.    ? etiology.    Agreeable to medications prescribed.    Agreeable to COVID-19 test obtained.    Advised test result will show in MyChart.    Patient will follow-up if needed while waiting for test result.

## 2021-08-31 NOTE — LETTER
August 31, 2021         Patient: Ashley Johansen   YOB: 1965   Date of Visit: 8/31/2021           To Whom it May Concern:    Ashley Johansen was seen in my clinic on 8/31/2021.     Please excuse from work for 8/30 thru and including 9/3/21 due to medical condition.    If you have any questions or concerns, please don't hesitate to call.        Sincerely,           Alexander Joe M.D.  Electronically Signed

## 2021-11-01 NOTE — TELEPHONE ENCOUNTER
LVM for the pt wanting to schedule a 6 month fv. I advise pt to give us a call back to schedule the apt.    Yes

## 2021-11-10 ENCOUNTER — TELEMEDICINE (OUTPATIENT)
Dept: CARDIOLOGY | Facility: PHYSICIAN GROUP | Age: 56
End: 2021-11-10
Payer: COMMERCIAL

## 2021-11-10 VITALS
SYSTOLIC BLOOD PRESSURE: 125 MMHG | HEART RATE: 92 BPM | HEIGHT: 61 IN | DIASTOLIC BLOOD PRESSURE: 71 MMHG | WEIGHT: 195 LBS | BODY MASS INDEX: 36.82 KG/M2

## 2021-11-10 DIAGNOSIS — I47.10 SVT (SUPRAVENTRICULAR TACHYCARDIA) (HCC): ICD-10-CM

## 2021-11-10 DIAGNOSIS — J45.20 MILD INTERMITTENT REACTIVE AIRWAY DISEASE WITHOUT COMPLICATION: ICD-10-CM

## 2021-11-10 DIAGNOSIS — E78.00 PURE HYPERCHOLESTEROLEMIA: ICD-10-CM

## 2021-11-10 DIAGNOSIS — R06.09 DYSPNEA ON EXERTION: ICD-10-CM

## 2021-11-10 DIAGNOSIS — G47.33 OSA ON CPAP: ICD-10-CM

## 2021-11-10 DIAGNOSIS — R07.89 OTHER CHEST PAIN: ICD-10-CM

## 2021-11-10 DIAGNOSIS — Z79.899 LONG TERM CURRENT USE OF ANTIARRHYTHMIC DRUG: ICD-10-CM

## 2021-11-10 DIAGNOSIS — R00.2 PALPITATIONS: ICD-10-CM

## 2021-11-10 DIAGNOSIS — R42 LIGHTHEADED: ICD-10-CM

## 2021-11-10 DIAGNOSIS — I48.0 PAF (PAROXYSMAL ATRIAL FIBRILLATION) (HCC): ICD-10-CM

## 2021-11-10 PROCEDURE — 99214 OFFICE O/P EST MOD 30 MIN: CPT | Mod: 95 | Performed by: INTERNAL MEDICINE

## 2021-11-10 RX ORDER — METOPROLOL SUCCINATE 25 MG/1
12.5 TABLET, EXTENDED RELEASE ORAL DAILY
Qty: 45 TABLET | Refills: 7 | Status: SHIPPED | OUTPATIENT
Start: 2021-11-10 | End: 2022-04-11 | Stop reason: SDUPTHER

## 2021-11-10 RX ORDER — FLECAINIDE ACETATE 50 MG/1
50 TABLET ORAL 2 TIMES DAILY
Qty: 180 TABLET | Refills: 7 | Status: SHIPPED | OUTPATIENT
Start: 2021-11-10 | End: 2023-01-10

## 2021-11-10 NOTE — PROGRESS NOTES
"Subjective:   Chief Complaint:   Chief Complaint   Patient presents with   • Atrial Fibrillation     F/V Dx: PAF (paroxysmal atrial fibrillation) (HCC)   • Palpitations   • Chest Pain     This evaluation was conducted via Zoom using secure and encrypted videoconferencing technology. The patient was in a private location in the state of [place].    The patient's identity was confirmed and verbal consent was obtained for this virtual visit.    \"Ashley\" Jessie Johansen is a 55 y.o. female who returns for atypical chest pain, VERONICA, palpitations, PAF and SVT, LINA/CPAP.    She had been waking up at night, having discomfort in lower part of left breast, notes heart is racing, sits up, takes deep breaths, happening over the past few weeks.    Was feeling very lightheaded the next day and chest felt tight, radiated to throat.  Feels like it is racing quickly, not sure if it was irregular.  Zio with fast SVT and afib in sleep.  During walking was gets VERONICA and fluttering, at times feels like her heart rate cannot get up to speed. Then gets CP that takes a while to go away with rest.    We started flecainide and she feels great.  Walking, moving, feeling better.  Saw Dr. Paz, not needing ablation.    Echo was normal, normal size LA.  [id number] of 1, ASA only.  TSH ok .    Has LINA, saw Dr. Coreas, CPAP.    She has an inhaler, gets reactive airway but not asthma, not prior asthma, only in [place].    Has GERD- EGD and C scope done.  Has hiatal hernia and ulcer, started PPI.    She previously had episodes of left upper chest and shoulder tightness which sometimes radiates to the upper arm. It has happened while driving when she is stressed, doing dishes and at rest. It typically resolve spontaneously after 5 minutes.   She had a nuclear stress test in 2017 which was normal. She exercised on the treadmill.  No more CP, exercising without symptoms.       She has some mild dyspnea on exertion which is intermittent and sometimes some " mild shortness of breath when lying down.   She notes mild lower extremity edema particularly after exercise.   Echo normal, normal IVC.    Has very mildly elevated LDL of 103.  HDL protective.  No hypertension.  Lifelong nonsmoker.  No diabetes.   She is adopted on her father's side and does not know her father's biologic history.     She has never had syncope.   She does not get dizzy or lightheaded.    Lives in [place] with her .  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  Has been walking,  at the gym.    Not vaccinated yet. She is considering.    DATA REVIEWED by me:  ECG 3-  Sinus, 83, early R wave progression, normal variant    ECG April 25, 2018  NSR, 62, normal    ZioPatch Summary: 4-  1. Sinus rhythm with appropriate heart rate range. Average 86, range 46 to 250 bpm.   2. Normal sinus node and AV node conduction normal. No pauses.   3. Rare PACs.   4. Rare PVCs.   5. Paroxysmal atrial fibrillation, <1% burden, up to 7.5 minutes. Frequent SVT, up to 2 mins, up to 250 bpm.   6. 125 pt triggers during sinus, PAF, SVT, PVCs. Symptoms reported include racing, fluttering.     Conclusion: Sinus with frequent symptomatic fast SVT, paroxsymal atrial fibrillation.     Echo 4-  No prior study is available for comparison.   Left ventricular ejection fraction is visually estimated to be 65%.  Normal left atrial size.  No significant valve or doppler abnormality.       Nuclear stress test January 24, 2017  Exercised 6 minutes and 50 seconds achieving 7 METS, no concerning issues  Normal perfusion    Most recent labs:     Lab Results   Component Value Date/Time    HEMOGLOBIN 13.6 10/16/2019 09:00 PM    HEMOGLOBIN 11.8 (L) 02/25/2014 04:25 PM    HEMATOCRIT 40.4 10/16/2019 09:00 PM    HEMATOCRIT 35.2 (L) 02/25/2014 04:25 PM    MCV 92 10/16/2019 09:00 PM    MCV 90.7 02/25/2014 04:25 PM    TSH 1.720 10/16/2019 09:00 PM      Lab Results   Component Value Date/Time    SODIUM 139 07/28/2021  12:58 PM    POTASSIUM 4.0 07/28/2021 12:58 PM    CHLORIDE 104 07/28/2021 12:58 PM    CO2 24 07/28/2021 12:58 PM    GLUCOSE 98 07/28/2021 12:58 PM    BUN 15 07/28/2021 12:58 PM    CREATININE 0.80 07/28/2021 12:58 PM      Lab Results   Component Value Date/Time    ASTSGOT 12 10/16/2019 09:00 PM    ASTSGOT 10 (L) 02/25/2014 04:25 PM    ALTSGPT 14 10/16/2019 09:00 PM    ALTSGPT 8 02/25/2014 04:25 PM    ALBUMIN 4.4 10/16/2019 09:00 PM    ALBUMIN 4.3 02/25/2014 04:25 PM      Lab Results   Component Value Date/Time    CHOLSTRLTOT 168 02/24/2012 09:58 AM     02/24/2012 09:58 AM    HDL 52 02/24/2012 09:58 AM    TRIGLYCERIDE 63 02/24/2012 09:58 AM       Last blood work available to me is from February 2014, creatinine was 0.8, potassium was 3.8, LFTs were normal  Last lipid panel from 2012 revealed total cholesterol 168, Trig 63, HDL 52,     Past Medical History:   Diagnosis Date   • Allergy    • Anesthesia     clautraphobic unable to tolerated mask   • Apnea, sleep    • Arthritis     hands and feet   • Daytime sleepiness     sometimes   • Gasping for breath    • Hemorrhoids    • Indigestion    • Infectious disease 5/2014    step throat   • Insomnia    • Migraines    • Morning headache    • PAF (paroxysmal atrial fibrillation) ([facility]) 6/24/2020   • Pleurisy      Past Surgical History:   Procedure Laterality Date   • HYSTEROSCOPY NOVASURE-2  6/17/2014    Performed by [facility] Thomason M.D. at SURGERY SAME DAY [facility]   • TUBAL COAGULATION LAPAROSCOPIC BILATERAL  1998 1998   • APPENDECTOMY     • BOWEL RESECTION     • CYSTECTOMY     • EXPLORATORY LAPAROTOMY      for surgical adhesions, polyps   • HEMORRHOIDECTOMY     • PRIMARY C SECTION       Family History   Adopted: Yes   Problem Relation Age of Onset   • Other Mother         hypothyroid   • Diabetes Mother    • Hypertension Mother    • Hyperlipidemia Mother    • Thyroid Mother    • No Known Problems Father         Adopted by father   • No Known Problems  Maternal Grandmother    • No Known Problems Maternal Grandfather    • No Known Problems Sister         Half sister     Social History     Socioeconomic History   • Marital status:      Spouse name: Not on file   • Number of children: Not on file   • Years of education: Not on file   • Highest education level: Not on file   Occupational History   • Not on file   Tobacco Use   • Smoking status: Former Smoker     Packs/day: 0.50     Years: 6.00     Pack years: 3.00     Types: Cigarettes     Quit date: 1996     Years since quittin.8   • Smokeless tobacco: Never Used   Vaping Use   • Vaping Use: Never used   Substance and Sexual Activity   • Alcohol use: Yes     Comment: occ   • Drug use: No   • Sexual activity: Yes   Other Topics Concern   • Not on file   Social History Narrative   • Not on file     Social Determinants of Health     Financial Resource Strain:    • Difficulty of Paying Living Expenses: Not on file   Food Insecurity:    • Worried About Running Out of Food in the Last Year: Not on file   • Ran Out of Food in the Last Year: Not on file   Transportation Needs:    • Lack of Transportation (Medical): Not on file   • Lack of Transportation (Non-Medical): Not on file   Physical Activity:    • Days of Exercise per Week: Not on file   • Minutes of Exercise per Session: Not on file   Stress:    • Feeling of Stress : Not on file   Social Connections:    • Frequency of Communication with Friends and Family: Not on file   • Frequency of Social Gatherings with Friends and Family: Not on file   • Attends Mosque Services: Not on file   • Active Member of Clubs or Organizations: Not on file   • Attends Club or Organization Meetings: Not on file   • Marital Status: Not on file   Intimate Partner Violence:    • Fear of Current or Ex-Partner: Not on file   • Emotionally Abused: Not on file   • Physically Abused: Not on file   • Sexually Abused: Not on file   Housing Stability:    • Unable to Pay for  "Housing in the Last Year: Not on file   • Number of Places Lived in the Last Year: Not on file   • Unstable Housing in the Last Year: Not on file     Allergies   Allergen Reactions   • Codeine Vomiting and Swelling   • Other Environmental Hives     Kiwi fruit       Current Outpatient Medications   Medication Sig Dispense Refill   • flecainide (TAMBOCOR) 50 MG tablet Take 1 Tablet by mouth 2 times a day. 180 Tablet 7   • metoprolol SR (TOPROL XL) 25 MG TABLET SR 24 HR Take 0.5 Tablets by mouth every day. 45 Tablet 7   • aspirin EC (ECOTRIN) 81 MG Tablet Delayed Response Take 1 tablet by mouth every day. 30 tablet    • sumatriptan (IMITREX) 100 MG tablet Take 100 mg by mouth Once PRN for Migraine.     • albuterol 108 (90 Base) MCG/ACT Aero Soln inhalation aerosol Inhale 2 Puffs by mouth every four hours as needed (wheezing). 1 Inhaler 0   • Esomeprazole Magnesium (NEXIUM) 20 MG Pack Take 20 mg by mouth 2 times a day.       No current facility-administered medications for this visit.       ROS    All others systems reviewed and negative.     Objective:     /71 (BP Location: Left arm, Patient Position: Sitting, BP Cuff Size: Adult)   Pulse 92   Ht 1.549 m (5' 1\")   Wt 88.5 kg (195 lb)  Body mass index is 36.84 kg/m².    Physical Exam:  General: No acute distress. Well nourished.   HEENT: EOM grossly intact, no scleral icterus, no pharyngeal erythema.   Neck:  No JVD noted at 90 degrees, trachea midline  CVS: Pulse as reported by patient, no visible LE edema.  Resp: Unlabored respiratory effort, no cough or audible wheeze  MSK/Ext: No clubbing or cyanosis visible appreciated.  Skin: No rashes in visible areas.  Neurological: CN III-XII grossly intact. No focal deficits.   Psych: A&O x 3, appropriate affect, good judgement, well groomed      Assessment:     1. PAF (paroxysmal atrial fibrillation) (HCC)     2. Palpitations     3. SVT (supraventricular tachycardia) (HCC)     4. Lightheaded     5. Other chest pain   " "  6. Dyspnea on exertion     7. LINA on CPAP     8. Mild intermittent reactive airway disease without complication     9. Pure hypercholesterolemia     10. Long term current use of antiarrhythmic drug         Medical Decision Making:  Today's Assessment / Status / Plan:     -Symptomatic PAF and SVT, doing well with current meds  -She is doing well, no need for ablation  -I am avoiding calcium channel blockers because of the leg edema which does not appear to be her heart  -Has stomach ulcer, on ASA now, has been on nexium, may need to consider referral back to GI or alternative. Stay on PPI.  -CHADS2 vasc is 1, no prior TIA/CVA.  Aspirin 81 mg only. Tolerating this ok from GI standpoint.  -LINA, on CPAP  -Flecainide is a high risk medication requiring annual ecg, annual electrolytes, can be done with us or PCP  -RTC 1 year with ecg    Written instructions given today:    -Walking is wonderful!  Great job.    -If you have having a \"bad palpitation day\" you can take an extra dose of flecainide, or even 2. If this happens, send a MyChart    -I will see you in one year unless something changes, send a MyChart message and we can get you back in    Return in about 1 year (around 11/10/2022).    It is my pleasure to participate in the care of Ms. Johansen.  Please do not hesitate to contact me with questions or concerns.    Krystina Nation MD, [facility]  Cardiologist [facility]    Please note that this dictation was created using voice recognition software. I have made every reasonable attempt to correct obvious errors, but it is possible there are errors of grammar and possibly content that I did not discover before finalizing the note.  "

## 2021-11-10 NOTE — PATIENT INSTRUCTIONS
"-Walking is wonderful!  Great job.    -If you have having a \"bad palpitation day\" you can take an extra dose of flecainide, or even 2. If this happens, send a MyChart    -I will see you in one year unless something changes, send a MyChart message and we can get you back in  "

## 2021-11-10 NOTE — LETTER
"     [facility]   [address]  [place] [address]  Phone: [phone]  Fax: [phone]              Ashley Johansen  1965    Encounter Date: 11/10/2021    Krystina Nation M.D.          PROGRESS NOTE:  Subjective:   Chief Complaint:   Chief Complaint   Patient presents with   • Atrial Fibrillation     F/V Dx: PAF (paroxysmal atrial fibrillation) (HCC)   • Palpitations   • Chest Pain     This evaluation was conducted via Zoom using secure and encrypted videoconferencing technology. The patient was in a private location in the [facility] [place].    The patient's identity was confirmed and verbal consent was obtained for this virtual visit.    \"Ashley\" Jessie Johansen is a 55 y.o. female who returns for atypical chest pain, VERONICA, palpitations, PAF and SVT, LINA/CPAP.    She had been waking up at night, having discomfort in lower part of left breast, notes heart is racing, sits up, takes deep breaths, happening over the past few weeks.    Was feeling very lightheaded the next day and chest felt tight, radiated to throat.  Feels like it is racing quickly, not sure if it was irregular.  Zio with fast SVT and afib in sleep.  During walking was gets VERONICA and fluttering, at times feels like her heart rate cannot get up to speed. Then gets CP that takes a while to go away with rest.    We started flecainide and she feels great.  Walking, moving, feeling better.  Saw Dr. Paz, not needing ablation.    Echo was normal, normal size LA.  [id number] of 1, ASA only.  TSH ok .    Has LINA, saw Dr. Coreas, CPAP.    She has an inhaler, gets reactive airway but not asthma, not prior asthma, only in [place].    Has GERD- EGD and C scope done.  Has hiatal hernia and ulcer, started PPI.    She previously had episodes of left upper chest and shoulder tightness which sometimes radiates to the upper arm. It has happened while driving when she is stressed, doing " dishes and at rest. It typically resolve spontaneously after 5 minutes.   She had a nuclear stress test in 2017 which was normal. She exercised on the treadmill.  No more CP, exercising without symptoms.       She has some mild dyspnea on exertion which is intermittent and sometimes some mild shortness of breath when lying down.   She notes mild lower extremity edema particularly after exercise.   Echo normal, normal IVC.    Has very mildly elevated LDL of 103.  HDL protective.  No hypertension.  Lifelong nonsmoker.  No diabetes.   She is adopted on her father's side and does not know her father's biologic history.     She has never had syncope.   She does not get dizzy or lightheaded.    Lives in [place] with her .  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  Has been walking,  at the gym.    Not vaccinated yet. She is considering.    DATA REVIEWED by me:  ECG 3-  Sinus, 83, early R wave progression, normal variant    ECG April 25, 2018  NSR, 62, normal    ZioPatch Summary: 4-  1. Sinus rhythm with appropriate heart rate range. Average 86, range 46 to 250 bpm.   2. Normal sinus node and AV node conduction normal. No pauses.   3. Rare PACs.   4. Rare PVCs.   5. Paroxysmal atrial fibrillation, <1% burden, up to 7.5 minutes. Frequent SVT, up to 2 mins, up to 250 bpm.   6. 125 pt triggers during sinus, PAF, SVT, PVCs. Symptoms reported include racing, fluttering.     Conclusion: Sinus with frequent symptomatic fast SVT, paroxsymal atrial fibrillation.     Echo 4-  No prior study is available for comparison.   Left ventricular ejection fraction is visually estimated to be 65%.  Normal left atrial size.  No significant valve or doppler abnormality.       Nuclear stress test January 24, 2017  Exercised 6 minutes and 50 seconds achieving 7 METS, no concerning issues  Normal perfusion    Most recent labs:     Lab Results   Component Value Date/Time    HEMOGLOBIN 13.6 10/16/2019 09:00 PM     HEMOGLOBIN 11.8 (L) 02/25/2014 04:25 PM    HEMATOCRIT 40.4 10/16/2019 09:00 PM    HEMATOCRIT 35.2 (L) 02/25/2014 04:25 PM    MCV 92 10/16/2019 09:00 PM    MCV 90.7 02/25/2014 04:25 PM    TSH 1.720 10/16/2019 09:00 PM      Lab Results   Component Value Date/Time    SODIUM 139 07/28/2021 12:58 PM    POTASSIUM 4.0 07/28/2021 12:58 PM    CHLORIDE 104 07/28/2021 12:58 PM    CO2 24 07/28/2021 12:58 PM    GLUCOSE 98 07/28/2021 12:58 PM    BUN 15 07/28/2021 12:58 PM    CREATININE 0.80 07/28/2021 12:58 PM      Lab Results   Component Value Date/Time    ASTSGOT 12 10/16/2019 09:00 PM    ASTSGOT 10 (L) 02/25/2014 04:25 PM    ALTSGPT 14 10/16/2019 09:00 PM    ALTSGPT 8 02/25/2014 04:25 PM    ALBUMIN 4.4 10/16/2019 09:00 PM    ALBUMIN 4.3 02/25/2014 04:25 PM      Lab Results   Component Value Date/Time    CHOLSTRLTOT 168 02/24/2012 09:58 AM     02/24/2012 09:58 AM    HDL 52 02/24/2012 09:58 AM    TRIGLYCERIDE 63 02/24/2012 09:58 AM       Last blood work available to me is from February 2014, creatinine was 0.8, potassium was 3.8, LFTs were normal  Last lipid panel from 2012 revealed total cholesterol 168, Trig 63, HDL 52,     Past Medical History:   Diagnosis Date   • Allergy    • Anesthesia     clautraphobic unable to tolerated mask   • Apnea, sleep    • Arthritis     hands and feet   • Daytime sleepiness     sometimes   • Gasping for breath    • Hemorrhoids    • Indigestion    • Infectious disease 5/2014    step throat   • Insomnia    • Migraines    • Morning headache    • PAF (paroxysmal atrial fibrillation) ([facility]) 6/24/2020   • Pleurisy      Past Surgical History:   Procedure Laterality Date   • HYSTEROSCOPY NOVASURE-2  6/17/2014    Performed by [facility] Thomason M.D. at SURGERY SAME DAY [facility] ORS   • TUBAL COAGULATION LAPAROSCOPIC BILATERAL  1998 1998   • APPENDECTOMY     • BOWEL RESECTION     • CYSTECTOMY     • EXPLORATORY LAPAROTOMY      for surgical adhesions, polyps   • HEMORRHOIDECTOMY     •  PRIMARY C SECTION       Family History   Adopted: Yes   Problem Relation Age of Onset   • Other Mother         hypothyroid   • Diabetes Mother    • Hypertension Mother    • Hyperlipidemia Mother    • Thyroid Mother    • No Known Problems Father         Adopted by father   • No Known Problems Maternal Grandmother    • No Known Problems Maternal Grandfather    • No Known Problems Sister         Half sister     Social History     Socioeconomic History   • Marital status:      Spouse name: Not on file   • Number of children: Not on file   • Years of education: Not on file   • Highest education level: Not on file   Occupational History   • Not on file   Tobacco Use   • Smoking status: Former Smoker     Packs/day: 0.50     Years: 6.00     Pack years: 3.00     Types: Cigarettes     Quit date: 1996     Years since quittin.8   • Smokeless tobacco: Never Used   Vaping Use   • Vaping Use: Never used   Substance and Sexual Activity   • Alcohol use: Yes     Comment: occ   • Drug use: No   • Sexual activity: Yes   Other Topics Concern   • Not on file   Social History Narrative   • Not on file     Social Determinants of Health     Financial Resource Strain:    • Difficulty of Paying Living Expenses: Not on file   Food Insecurity:    • Worried About Running Out of Food in the Last Year: Not on file   • Ran Out of Food in the Last Year: Not on file   Transportation Needs:    • Lack of Transportation (Medical): Not on file   • Lack of Transportation (Non-Medical): Not on file   Physical Activity:    • Days of Exercise per Week: Not on file   • Minutes of Exercise per Session: Not on file   Stress:    • Feeling of Stress : Not on file   Social Connections:    • Frequency of Communication with Friends and Family: Not on file   • Frequency of Social Gatherings with Friends and Family: Not on file   • Attends Muslim Services: Not on file   • Active Member of Clubs or Organizations: Not on file   • Attends Club or  "Organization Meetings: Not on file   • Marital Status: Not on file   Intimate Partner Violence:    • Fear of Current or Ex-Partner: Not on file   • Emotionally Abused: Not on file   • Physically Abused: Not on file   • Sexually Abused: Not on file   Housing Stability:    • Unable to Pay for Housing in the Last Year: Not on file   • Number of Places Lived in the Last Year: Not on file   • Unstable Housing in the Last Year: Not on file     Allergies   Allergen Reactions   • Codeine Vomiting and Swelling   • Other Environmental Hives     Kiwi fruit       Current Outpatient Medications   Medication Sig Dispense Refill   • flecainide (TAMBOCOR) 50 MG tablet Take 1 Tablet by mouth 2 times a day. 180 Tablet 7   • metoprolol SR (TOPROL XL) 25 MG TABLET SR 24 HR Take 0.5 Tablets by mouth every day. 45 Tablet 7   • aspirin EC (ECOTRIN) 81 MG Tablet Delayed Response Take 1 tablet by mouth every day. 30 tablet    • sumatriptan (IMITREX) 100 MG tablet Take 100 mg by mouth Once PRN for Migraine.     • albuterol 108 (90 Base) MCG/ACT Aero Soln inhalation aerosol Inhale 2 Puffs by mouth every four hours as needed (wheezing). 1 Inhaler 0   • Esomeprazole Magnesium (NEXIUM) 20 MG Pack Take 20 mg by mouth 2 times a day.       No current facility-administered medications for this visit.       ROS    All others systems reviewed and negative.     Objective:     /71 (BP Location: Left arm, Patient Position: Sitting, BP Cuff Size: Adult)   Pulse 92   Ht 1.549 m (5' 1\")   Wt 88.5 kg (195 lb)  Body mass index is 36.84 kg/m².    Physical Exam:  General: No acute distress. Well nourished.   HEENT: EOM grossly intact, no scleral icterus, no pharyngeal erythema.   Neck:  No JVD noted at 90 degrees, trachea midline  CVS: Pulse as reported by patient, no visible LE edema.  Resp: Unlabored respiratory effort, no cough or audible wheeze  MSK/Ext: No clubbing or cyanosis visible appreciated.  Skin: No rashes in visible areas.  Neurological: " "CN III-XII grossly intact. No focal deficits.   Psych: A&O x 3, appropriate affect, good judgement, well groomed      Assessment:     1. PAF (paroxysmal atrial fibrillation) (HCC)     2. Palpitations     3. SVT (supraventricular tachycardia) (HCC)     4. Lightheaded     5. Other chest pain     6. Dyspnea on exertion     7. LINA on CPAP     8. Mild intermittent reactive airway disease without complication     9. Pure hypercholesterolemia     10. Long term current use of antiarrhythmic drug         Medical Decision Making:  Today's Assessment / Status / Plan:     -Symptomatic PAF and SVT, doing well with current meds  -She is doing well, no need for ablation  -I am avoiding calcium channel blockers because of the leg edema which does not appear to be her heart  -Has stomach ulcer, on ASA now, has been on nexium, may need to consider referral back to GI or alternative. Stay on PPI.  -CHADS2 vasc is 1, no prior TIA/CVA.  Aspirin 81 mg only. Tolerating this ok from GI standpoint.  -LINA, on CPAP  -Flecainide is a high risk medication requiring annual ecg, annual electrolytes, can be done with us or PCP  -RTC 1 year with ecg    Written instructions given today:    -Walking is wonderful!  Great job.    -If you have having a \"bad palpitation day\" you can take an extra dose of flecainide, or even 2. If this happens, send a MyChart    -I will see you in one year unless something changes, send a MyChart message and we can get you back in    Return in about 1 year (around 11/10/2022).    It is my pleasure to participate in the care of Ms. Johansen.  Please do not hesitate to contact me with questions or concerns.    Krystina Nation MD, [facility]  Cardiologist [facility]    Please note that this dictation was created using voice recognition software. I have made every reasonable attempt to correct obvious errors, but it is possible there are errors of grammar and possibly content that I did not " discover before finalizing the note.        Cathy Espinal M.D.  [address] [place] [id number]  Via Fax: [phone]

## 2021-11-11 ENCOUNTER — HOSPITAL ENCOUNTER (OUTPATIENT)
Dept: LAB | Facility: MEDICAL CENTER | Age: 56
End: 2021-11-11
Attending: PHYSICIAN ASSISTANT
Payer: COMMERCIAL

## 2021-11-11 PROCEDURE — 36415 COLL VENOUS BLD VENIPUNCTURE: CPT

## 2021-11-11 PROCEDURE — 84439 ASSAY OF FREE THYROXINE: CPT

## 2021-11-11 PROCEDURE — 84443 ASSAY THYROID STIM HORMONE: CPT

## 2021-11-11 PROCEDURE — 80061 LIPID PANEL: CPT

## 2021-11-11 PROCEDURE — 80053 COMPREHEN METABOLIC PANEL: CPT

## 2021-11-12 LAB
ALBUMIN SERPL BCP-MCNC: 4.4 G/DL (ref 3.2–4.9)
ALBUMIN/GLOB SERPL: 1.4 G/DL
ALP SERPL-CCNC: 94 U/L (ref 30–99)
ALT SERPL-CCNC: 18 U/L (ref 2–50)
ANION GAP SERPL CALC-SCNC: 11 MMOL/L (ref 7–16)
AST SERPL-CCNC: 14 U/L (ref 12–45)
BILIRUB SERPL-MCNC: 0.4 MG/DL (ref 0.1–1.5)
BUN SERPL-MCNC: 17 MG/DL (ref 8–22)
CALCIUM SERPL-MCNC: 9 MG/DL (ref 8.5–10.5)
CHLORIDE SERPL-SCNC: 105 MMOL/L (ref 96–112)
CHOLEST SERPL-MCNC: 216 MG/DL (ref 100–199)
CO2 SERPL-SCNC: 24 MMOL/L (ref 20–33)
CREAT SERPL-MCNC: 0.98 MG/DL (ref 0.5–1.4)
FASTING STATUS PATIENT QL REPORTED: NORMAL
GLOBULIN SER CALC-MCNC: 3.2 G/DL (ref 1.9–3.5)
GLUCOSE SERPL-MCNC: 102 MG/DL (ref 65–99)
HDLC SERPL-MCNC: 66 MG/DL
LDLC SERPL CALC-MCNC: 131 MG/DL
POTASSIUM SERPL-SCNC: 4.2 MMOL/L (ref 3.6–5.5)
PROT SERPL-MCNC: 7.6 G/DL (ref 6–8.2)
SODIUM SERPL-SCNC: 140 MMOL/L (ref 135–145)
T4 FREE SERPL-MCNC: 1.17 NG/DL (ref 0.93–1.7)
TRIGL SERPL-MCNC: 97 MG/DL (ref 0–149)
TSH SERPL DL<=0.005 MIU/L-ACNC: 1.66 UIU/ML (ref 0.38–5.33)

## 2021-11-23 ENCOUNTER — PATIENT MESSAGE (OUTPATIENT)
Dept: MEDICAL GROUP | Facility: MEDICAL CENTER | Age: 56
End: 2021-11-23

## 2021-11-23 NOTE — TELEPHONE ENCOUNTER
From: Ashley Johansen  To: Nurse Practitioner Gilda Richards  Sent: 11/23/2021 9:11 AM PST  Subject: Migraine Medicine    I am completely out of my sumatriptan and wanted to know if you can send the refill to the [facility] in [place]?

## 2021-11-29 RX ORDER — SUMATRIPTAN 100 MG/1
100 TABLET, FILM COATED ORAL
Qty: 10 TABLET | Refills: 3 | Status: SHIPPED | OUTPATIENT
Start: 2021-11-29

## 2022-04-11 ENCOUNTER — TELEPHONE (OUTPATIENT)
Dept: CARDIOLOGY | Facility: MEDICAL CENTER | Age: 57
End: 2022-04-11
Payer: COMMERCIAL

## 2022-04-11 RX ORDER — METOPROLOL SUCCINATE 25 MG/1
12.5 TABLET, EXTENDED RELEASE ORAL DAILY
Qty: 45 TABLET | Refills: 2 | Status: SHIPPED | OUTPATIENT
Start: 2022-04-11 | End: 2023-02-08 | Stop reason: SDUPTHER

## 2022-04-11 NOTE — TELEPHONE ENCOUNTER
BRENNAN Ramirez has 1 refill left for:  metoprolol SR (TOPROL XL) 25 MG TABLET SR 24 HR (Order #[id number]) on 11/10/21    Ashley -  [phone]    Thank you,   Lily CROCKETT

## 2022-05-19 ENCOUNTER — HOSPITAL ENCOUNTER (OUTPATIENT)
Dept: LAB | Facility: MEDICAL CENTER | Age: 57
End: 2022-05-19
Attending: FAMILY MEDICINE
Payer: COMMERCIAL

## 2022-05-19 LAB
BASOPHILS # BLD AUTO: 0.4 % (ref 0–1.8)
BASOPHILS # BLD: 0.04 K/UL (ref 0–0.12)
CRP SERPL HS-MCNC: 3.68 MG/DL (ref 0–0.75)
EOSINOPHIL # BLD AUTO: 0.06 K/UL (ref 0–0.51)
EOSINOPHIL NFR BLD: 0.7 % (ref 0–6.9)
ERYTHROCYTE [DISTWIDTH] IN BLOOD BY AUTOMATED COUNT: 47.8 FL (ref 35.9–50)
ERYTHROCYTE [SEDIMENTATION RATE] IN BLOOD BY WESTERGREN METHOD: 35 MM/HOUR (ref 0–25)
HBV SURFACE AG SER QL: NORMAL
HCT VFR BLD AUTO: 40.3 % (ref 37–47)
HGB BLD-MCNC: 13.1 G/DL (ref 12–16)
IMM GRANULOCYTES # BLD AUTO: 0.02 K/UL (ref 0–0.11)
IMM GRANULOCYTES NFR BLD AUTO: 0.2 % (ref 0–0.9)
LYMPHOCYTES # BLD AUTO: 2.38 K/UL (ref 1–4.8)
LYMPHOCYTES NFR BLD: 26.5 % (ref 22–41)
MCH RBC QN AUTO: 29.8 PG (ref 27–33)
MCHC RBC AUTO-ENTMCNC: 32.5 G/DL (ref 33.6–35)
MCV RBC AUTO: 91.8 FL (ref 81.4–97.8)
MONOCYTES # BLD AUTO: 0.72 K/UL (ref 0–0.85)
MONOCYTES NFR BLD AUTO: 8 % (ref 0–13.4)
NEUTROPHILS # BLD AUTO: 5.77 K/UL (ref 2–7.15)
NEUTROPHILS NFR BLD: 64.2 % (ref 44–72)
NRBC # BLD AUTO: 0 K/UL
NRBC BLD-RTO: 0 /100 WBC
PLATELET # BLD AUTO: 269 K/UL (ref 164–446)
PMV BLD AUTO: 11.2 FL (ref 9–12.9)
RBC # BLD AUTO: 4.39 M/UL (ref 4.2–5.4)
WBC # BLD AUTO: 9 K/UL (ref 4.8–10.8)

## 2022-05-19 PROCEDURE — 85652 RBC SED RATE AUTOMATED: CPT

## 2022-05-19 PROCEDURE — 87340 HEPATITIS B SURFACE AG IA: CPT

## 2022-05-19 PROCEDURE — 85025 COMPLETE CBC W/AUTO DIFF WBC: CPT

## 2022-05-19 PROCEDURE — 86038 ANTINUCLEAR ANTIBODIES: CPT

## 2022-05-19 PROCEDURE — 36415 COLL VENOUS BLD VENIPUNCTURE: CPT

## 2022-05-19 PROCEDURE — 83516 IMMUNOASSAY NONANTIBODY: CPT | Mod: 91

## 2022-05-19 PROCEDURE — 86140 C-REACTIVE PROTEIN: CPT

## 2022-05-21 LAB
MYELOPEROXIDASE AB SER-ACNC: 0 AU/ML (ref 0–19)
PROTEINASE3 AB SER-ACNC: 0 AU/ML (ref 0–19)

## 2022-05-22 LAB — NUCLEAR IGG SER QL IA: NORMAL

## 2023-01-09 DIAGNOSIS — I48.0 PAF (PAROXYSMAL ATRIAL FIBRILLATION) (HCC): ICD-10-CM

## 2023-01-10 RX ORDER — FLECAINIDE ACETATE 50 MG/1
50 TABLET ORAL 2 TIMES DAILY
Qty: 180 TABLET | Refills: 0 | Status: SHIPPED | OUTPATIENT
Start: 2023-01-10 | End: 2023-04-12 | Stop reason: SDUPTHER

## 2023-01-10 NOTE — TELEPHONE ENCOUNTER
Is the patient due for a refill? Yes    Was the patient seen the past year? No    Date of last office visit: 11/10/21    Does the patient have an upcoming appointment?  No   If yes, When?     Provider to refill:  JUAN MANUEL AMIN,  TW  ADD    Does the patients insurance require a 100 day supply?  No   Routing refill request to provider for review/approval because:  Labs out of range:  Creatinine, potassium    Creatinine   Date Value Ref Range Status   03/08/2021 1.09 (H) 0.52 - 1.04 mg/dL Final     Potassium   Date Value Ref Range Status   03/08/2021 3.2 (L) 3.4 - 5.3 mmol/L Final     Henrry AZUL RN

## 2023-02-08 DIAGNOSIS — I48.0 PAF (PAROXYSMAL ATRIAL FIBRILLATION) (HCC): ICD-10-CM

## 2023-02-10 RX ORDER — METOPROLOL SUCCINATE 25 MG/1
12.5 TABLET, EXTENDED RELEASE ORAL DAILY
Qty: 30 TABLET | Refills: 0 | Status: SHIPPED | OUTPATIENT
Start: 2023-02-10 | End: 2023-04-12 | Stop reason: SDUPTHER

## 2023-02-10 NOTE — TELEPHONE ENCOUNTER
Is the patient due for a refill? Yes    Was the patient seen the past year? No    Date of last office visit: 11/10/2021    Does the patient have an upcoming appointment?  Yes   If yes, When? 3/8/2023    Provider to refill:MARIO ALBERTO LAGOS    Does the patients insurance require a 100 day supply?  No

## 2023-03-08 ENCOUNTER — OFFICE VISIT (OUTPATIENT)
Dept: CARDIOLOGY | Facility: MEDICAL CENTER | Age: 58
End: 2023-03-08
Payer: COMMERCIAL

## 2023-03-08 VITALS
DIASTOLIC BLOOD PRESSURE: 98 MMHG | HEART RATE: 74 BPM | WEIGHT: 214 LBS | HEIGHT: 61 IN | RESPIRATION RATE: 16 BRPM | BODY MASS INDEX: 40.4 KG/M2 | OXYGEN SATURATION: 97 % | SYSTOLIC BLOOD PRESSURE: 142 MMHG

## 2023-03-08 DIAGNOSIS — I48.0 PAF (PAROXYSMAL ATRIAL FIBRILLATION) (HCC): ICD-10-CM

## 2023-03-08 PROBLEM — M51.36 DEGENERATION OF LUMBAR INTERVERTEBRAL DISC: Status: ACTIVE | Noted: 2023-03-08

## 2023-03-08 PROBLEM — M51.369 DEGENERATION OF LUMBAR INTERVERTEBRAL DISC: Status: ACTIVE | Noted: 2023-03-08

## 2023-03-08 PROBLEM — G90.01 CAROTID SINUS SYNCOPE: Status: ACTIVE | Noted: 2023-02-03

## 2023-03-08 PROBLEM — M47.817 LUMBOSACRAL SPONDYLOSIS WITHOUT MYELOPATHY: Status: ACTIVE | Noted: 2023-03-08

## 2023-03-08 PROBLEM — I63.9 STROKE (HCC): Status: ACTIVE | Noted: 2023-02-02

## 2023-03-08 LAB — EKG IMPRESSION: NORMAL

## 2023-03-08 PROCEDURE — 99204 OFFICE O/P NEW MOD 45 MIN: CPT | Performed by: INTERNAL MEDICINE

## 2023-03-08 PROCEDURE — 93000 ELECTROCARDIOGRAM COMPLETE: CPT | Performed by: INTERNAL MEDICINE

## 2023-03-08 ASSESSMENT — ENCOUNTER SYMPTOMS
DIZZINESS: 0
DIARRHEA: 0
PALPITATIONS: 0
IRREGULAR HEARTBEAT: 0
HEARTBURN: 0
VOMITING: 0
ORTHOPNEA: 0
CONSTIPATION: 0
ABDOMINAL PAIN: 0
NAUSEA: 0
SHORTNESS OF BREATH: 0
FLANK PAIN: 0
FEVER: 0
DYSPNEA ON EXERTION: 0
COUGH: 0
ALTERED MENTAL STATUS: 0
CLAUDICATION: 0
WEIGHT GAIN: 0
PND: 0
SYNCOPE: 0
BACK PAIN: 0
DECREASED APPETITE: 0
DEPRESSION: 0
NEAR-SYNCOPE: 0
WEIGHT LOSS: 0
BLURRED VISION: 0

## 2023-03-08 ASSESSMENT — FIBROSIS 4 INDEX: FIB4 SCORE: 0.61

## 2023-03-08 NOTE — PROGRESS NOTES
Cardiology Note    Chief Complaint   Patient presents with    Atrial Fibrillation     F/v dx: PAF (paroxysmal atrial fibrillation) (HCC)    Chest Pain       History of Present Illness: Ashley Johansen is a 57 y.o. female PMH paroxysmal AF,  who presents to reestablish care.     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 Suspected this was cause of syncope was seen at [facility]. Working with Dr Russell. No new cardiac complaints. Dyspnea unchanged and working with sleep medicine to adjust cpap. No angina/chest pain, palpitations, especially no heart failure symptoms. Denies toxic social habits.     Review of Systems   Constitutional: Negative for decreased appetite, fever, malaise/fatigue, weight gain and weight loss.   HENT:  Negative for congestion and nosebleeds.    Eyes:  Negative for blurred vision.   Cardiovascular:  Negative for chest pain, claudication, dyspnea on exertion, irregular heartbeat, leg swelling, near-syncope, orthopnea, palpitations, paroxysmal nocturnal dyspnea and syncope.   Respiratory:  Negative for cough and shortness of breath.    Endocrine: Negative for cold intolerance and heat intolerance.   Skin:  Negative for rash.   Musculoskeletal:  Negative for back pain.   Gastrointestinal:  Negative for abdominal pain, constipation, diarrhea, heartburn, melena, nausea and vomiting.   Genitourinary:  Negative for dysuria, flank pain and hematuria.   Neurological:  Negative for dizziness.   Psychiatric/Behavioral:  Negative for altered mental status and depression.        Past Medical History:   Diagnosis Date    Allergy     Anesthesia     clautraphobic unable to tolerated mask    Apnea, sleep     Arthritis     hands and feet    Daytime sleepiness     sometimes    Gasping for breath     Hemorrhoids     Indigestion     Infectious disease 5/2014    step throat    Insomnia     Migraines     Morning headache     PAF (paroxysmal atrial fibrillation) (HCC) 6/24/2020    Pleurisy          Past Surgical  History:   Procedure Laterality Date    HYSTEROSCOPY NOVASURE-2  6/17/2014    Performed by [facility] Thomason M.D. at SURGERY SAME DAY [facility] ORS    TUBAL COAGULATION LAPAROSCOPIC BILATERAL  1998 1998    APPENDECTOMY      BOWEL RESECTION      CYSTECTOMY      EXPLORATORY LAPAROTOMY      for surgical adhesions, polyps    HEMORRHOIDECTOMY      PRIMARY C SECTION           Current Outpatient Medications   Medication Sig Dispense Refill    metoprolol SR (TOPROL XL) 25 MG TABLET SR 24 HR Take 0.5 Tablets by mouth every day. 30 Tablet 0    flecainide (TAMBOCOR) 50 MG tablet Take 1 Tablet by mouth 2 times a day. *Must schedule follow up appointment for further refills.* Please call [phone], thank you! 180 Tablet 0    sumatriptan (IMITREX) 100 MG tablet Take 1 Tablet by mouth one time as needed for Migraine. 10 Tablet 3    aspirin EC (ECOTRIN) 81 MG Tablet Delayed Response Take 1 tablet by mouth every day. 30 tablet     albuterol 108 (90 Base) MCG/ACT Aero Soln inhalation aerosol Inhale 2 Puffs by mouth every four hours as needed (wheezing). 1 Inhaler 0    Esomeprazole Magnesium 20 MG Pack Take 20 mg by mouth 2 times a day.       No current facility-administered medications for this visit.         Allergies   Allergen Reactions    Codeine Vomiting and Swelling    Other Environmental Hives     Kiwi fruit         Family History   Adopted: Yes   Problem Relation Age of Onset    Other Mother         hypothyroid    Diabetes Mother     Hypertension Mother     Hyperlipidemia Mother     Thyroid Mother     No Known Problems Father         Adopted by father    No Known Problems Maternal Grandmother     No Known Problems Maternal Grandfather     No Known Problems Sister         Half sister         Social History     Socioeconomic History    Marital status:      Spouse name: Not on file    Number of children: Not on file    Years of education: Not on file    Highest education level: Not on file   Occupational History     "Not on file   Tobacco Use    Smoking status: Former     Packs/day: 0.50     Years: 6.00     Pack years: 3.00     Types: Cigarettes     Quit date: 1996     Years since quittin.2    Smokeless tobacco: Never   Vaping Use    Vaping Use: Never used   Substance and Sexual Activity    Alcohol use: Yes     Comment: occ    Drug use: No    Sexual activity: Yes   Other Topics Concern    Not on file   Social History Narrative    Not on file     Social Determinants of Health     Financial Resource Strain: Not on file   Food Insecurity: Not on file   Transportation Needs: Not on file   Physical Activity: Not on file   Stress: Not on file   Social Connections: Not on file   Intimate Partner Violence: Not on file   Housing Stability: Not on file         Physical Exam:  Ambulatory Vitals  BP (!) 142/98 (BP Location: Left arm, Patient Position: Sitting, BP Cuff Size: Adult)   Pulse 74   Resp 16   Ht 1.549 m (5' 1\")   Wt 97.1 kg (214 lb)   SpO2 97%    BP Readings from Last 4 Encounters:   23 (!) 142/98   11/10/21 125/71   21 124/82   21 104/76     Weight/BMI:   Vitals:    23 0819   BP: (!) 142/98   Weight: 97.1 kg (214 lb)   Height: 1.549 m (5' 1\")    Body mass index is 40.43 kg/m².  Wt Readings from Last 4 Encounters:   23 97.1 kg (214 lb)   11/10/21 88.5 kg (195 lb)   21 92.5 kg (204 lb)   21 93.9 kg (207 lb)       Physical Exam  Constitutional:       General: She is not in acute distress.  HENT:      Head: Normocephalic and atraumatic.   Eyes:      Conjunctiva/sclera: Conjunctivae normal.      Pupils: Pupils are equal, round, and reactive to light.   Neck:      Vascular: No JVD.   Cardiovascular:      Rate and Rhythm: Normal rate and regular rhythm.      Heart sounds: Normal heart sounds. No murmur heard.    No friction rub. No gallop.   Pulmonary:      Effort: Pulmonary effort is normal. No respiratory distress.      Breath sounds: Normal breath sounds. No wheezing or rales. "   Chest:      Chest wall: No tenderness.   Abdominal:      General: Bowel sounds are normal. There is no distension.      Palpations: Abdomen is soft.   Musculoskeletal:      Cervical back: Normal range of motion and neck supple.   Skin:     General: Skin is warm and dry.   Neurological:      Mental Status: She is alert and oriented to person, place, and time.   Psychiatric:         Mood and Affect: Affect normal.         Judgment: Judgment normal.       Lab Data Review:  Lab Results   Component Value Date/Time    CHOLSTRLTOT 216 (H) 11/11/2021 07:57 AM     (H) 11/11/2021 07:57 AM    HDL 66 11/11/2021 07:57 AM    TRIGLYCERIDE 97 11/11/2021 07:57 AM       Lab Results   Component Value Date/Time    SODIUM 141 02/02/2023 02:32 PM    SODIUM 140 11/11/2021 07:57 AM    POTASSIUM 3.8 02/02/2023 02:32 PM    POTASSIUM 4.2 11/11/2021 07:57 AM    CHLORIDE 105 02/02/2023 02:32 PM    CHLORIDE 105 11/11/2021 07:57 AM    CO2 25.0 02/02/2023 02:32 PM    CO2 24 11/11/2021 07:57 AM    GLUCOSE 96 02/02/2023 02:32 PM    GLUCOSE 102 (H) 11/11/2021 07:57 AM    BUN 12.0 02/02/2023 02:32 PM    BUN 17 11/11/2021 07:57 AM    CREATININE 0.9 02/02/2023 02:32 PM    CREATININE 0.98 11/11/2021 07:57 AM    BUNCREATRAT 13.3 02/02/2023 02:32 PM    BUNCREATRAT 15 10/16/2019 09:00 PM     CrCl cannot be calculated (Patient's most recent lab result is older than the maximum 7 days allowed.).  Lab Results   Component Value Date/Time    ALKPHOSPHAT 101 02/02/2023 02:32 PM    ALKPHOSPHAT 94 11/11/2021 07:57 AM    ASTSGOT 12 02/02/2023 02:32 PM    ASTSGOT 14 11/11/2021 07:57 AM    ALTSGPT 15 02/02/2023 02:32 PM    ALTSGPT 18 11/11/2021 07:57 AM    TBILIRUBIN 0.4 02/02/2023 02:32 PM    TBILIRUBIN 0.4 11/11/2021 07:57 AM      Lab Results   Component Value Date/Time    WBC 10.20 (H) 02/02/2023 02:32 PM    WBC 9.0 05/19/2022 01:08 PM     Lab Results   Component Value Date/Time    HBA1C 5.4 02/03/2023 05:49 AM    HBA1C 5.3 10/16/2019 09:00 PM     No  components found for: TROP      Cardiac Imaging and Procedures Review:      EKG 3/8/23 interpreted by me sinus, low voltage    Mina Summary: 4-  1. Sinus rhythm with appropriate heart rate range. Average 86, range 46 to 250 bpm.   2. Normal sinus node and AV node conduction normal. No pauses.   3. Rare PACs.   4. Rare PVCs.   5. Paroxysmal atrial fibrillation, <1% burden, up to 7.5 minutes. Frequent SVT, up to 2 mins, up to 250 bpm.   6. 125 pt triggers during sinus, PAF, SVT, PVCs. Symptoms reported include racing, fluttering.   Conclusion: Sinus with frequent symptomatic fast SVT, paroxsymal atrial fibrillation.      Echo 4-  No prior study is available for comparison.   Left ventricular ejection fraction is visually estimated to be 65%.  Normal left atrial size.  No significant valve or doppler abnormality.     Nuclear stress test January 24, 2017  Exercised 6 minutes and 50 seconds achieving 7 METS, no concerning issues  Normal perfusion    Result Date: 2/2/2023 outside study [facility]  SMALL FOCUS OF OLD HEMORRHAGE IN THE LEFT POSTERIOR PARIETAL CENTRUM SEMIOVALE DEEP WHITE MATTER. OTHERWISE NEGATIVE EXAM. NO ACUTE ABNORMALITY.    Medical Decision Making:  Problem List Items Addressed This Visit       PAF (paroxysmal atrial fibrillation) (HCC)    Relevant Orders    EKG (Completed)     Paroxysmal AF - controlled. Continue metoprolol and flecainide. Continue aspirin. Chadsvasc 1 for gender only. Old small hemorrhagic cva incidental findings on brain mri.     Check blood pressure at home, record, and send results. Possibly white coat hypertension.     It was my pleasure to meet with Ms. Johansen.

## 2023-03-09 ENCOUNTER — HOSPITAL ENCOUNTER (OUTPATIENT)
Dept: RADIOLOGY | Facility: MEDICAL CENTER | Age: 58
End: 2023-03-09
Payer: COMMERCIAL

## 2023-03-10 ENCOUNTER — OFFICE VISIT (OUTPATIENT)
Dept: RHEUMATOLOGY | Facility: MEDICAL CENTER | Age: 58
End: 2023-03-10
Attending: STUDENT IN AN ORGANIZED HEALTH CARE EDUCATION/TRAINING PROGRAM
Payer: COMMERCIAL

## 2023-03-10 VITALS
DIASTOLIC BLOOD PRESSURE: 70 MMHG | HEART RATE: 91 BPM | TEMPERATURE: 98.3 F | SYSTOLIC BLOOD PRESSURE: 118 MMHG | OXYGEN SATURATION: 97 % | WEIGHT: 215 LBS | BODY MASS INDEX: 40.59 KG/M2 | HEIGHT: 61 IN

## 2023-03-10 DIAGNOSIS — M25.50 ARTHRALGIA OF MULTIPLE JOINTS: ICD-10-CM

## 2023-03-10 DIAGNOSIS — R21 RASH AND NONSPECIFIC SKIN ERUPTION: ICD-10-CM

## 2023-03-10 PROCEDURE — 99204 OFFICE O/P NEW MOD 45 MIN: CPT | Performed by: STUDENT IN AN ORGANIZED HEALTH CARE EDUCATION/TRAINING PROGRAM

## 2023-03-10 PROCEDURE — 99212 OFFICE O/P EST SF 10 MIN: CPT | Performed by: STUDENT IN AN ORGANIZED HEALTH CARE EDUCATION/TRAINING PROGRAM

## 2023-03-10 ASSESSMENT — FIBROSIS 4 INDEX: FIB4 SCORE: 0.61

## 2023-03-11 NOTE — PROGRESS NOTES
[facility] RHEUMATOLOGY  [address], [address], [place] [address]  Phone: [phone] ? Fax: [phone]    RHEUMATOLOGY NEW PATIENT VISIT NOTE      DATE OF SERVICE: 03/10/2023         Subjective     REFERRING PRACTITIONER:  Cathy Espinal M.D.  [address] [place] [address],  [place] [address]    PATIENT IDENTIFICATION:  Ashley Johansen  [address] [place] [address]    YOB: 1965    MEDICAL RECORD NUMBER: [id number]         CHIEF COMPLAINT:   Chief Complaint   Patient presents with    New Patient     Rash and other nonspecific skin eruption       HISTORY OF PRESENT ILLNESS:  Ashley Johansen is a 57 y.o. 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 Presents for rheumatologic evaluation of skin rash in the setting of elevated ESR and CRP with concern for autoimmune disease. Reports onset of symptoms since around 2017/2018 with sporadic episodic redness/swelling on the medial aspects of her lower legs/ankles with tingling/burning/numbness. She has also experienced waxing/waning pain in her entire hands, right hip, and left knee that worsen on physical activity, roughly 30-60 minutes of morning stiffness that improves with activity, and diffuse body aches. However, her most bothersome symptom is the episodic rash on her legs/ankles which she manages with application of ice and over-the-counter topical remedies with modest relief.    Pertinent lab results history: Elevated ESR 43<35 and CRP 18<3.68 (in 9/2022 < 5/2022); negative/normal HUSSAIN and HBV (in 5/2022), ANCA, anti-MPO and anti-PR3 (in 5/2022 and 9/2022), unremarkable CBC and CMP (in 2/2023).    REVIEW OF SYSTEMS:  Except as noted in the history above, relevant review of systems with emphasis on autoimmune rheumatic diseases was otherwise negative.      ACTIVE PROBLEM LIST:  Patient Active Problem List    Diagnosis  Date Noted    Rash and nonspecific skin eruption 03/10/2023    Arthralgia of multiple joints 03/10/2023    Degeneration of lumbar intervertebral disc 03/08/2023    Lumbosacral spondylosis without myelopathy 03/08/2023    Carotid sinus syncope 02/03/2023    Stroke ([facility]) 02/02/2023    Cervical radiculopathy 04/05/2021    PAF (paroxysmal atrial fibrillation) ([facility]) 06/24/2020    Sinus pain 11/11/2019    Chronic gastric ulcer without hemorrhage and without perforation 10/07/2019    Hiatal hernia 10/07/2019    Family history of diabetes mellitus (DM) 10/07/2019    Intractable migraine without aura and without status migrainosus 10/07/2019    Chronic right hip pain 10/07/2019    Chronic midline low back pain with right-sided sciatica 10/07/2019    Obesity (BMI 30-39.9) 10/07/2019    Sensation of fullness in right ear 10/07/2019    Other chest pain 04/25/2018    Gastroesophageal reflux disease without esophagitis 04/25/2018    Reactive airway disease 04/25/2018    Palpitations 04/25/2018    Dyspnea on exertion 04/25/2018    Excessive or frequent menstruation 06/17/2014    Irregular periods 04/13/2012    Weight gain 04/13/2012    Vitamin D deficiency 04/13/2012       PAST MEDICAL HISTORY:  Past Medical History:   Diagnosis Date    Allergy     Anesthesia     clautraphobic unable to tolerated mask    Apnea, sleep     Arthritis     hands and feet    Daytime sleepiness     sometimes    Gasping for breath     Hemorrhoids     Indigestion     Infectious disease 5/2014    step throat    Insomnia     Migraines     Morning headache     PAF (paroxysmal atrial fibrillation) ([facility]) 6/24/2020    Pleurisy        PAST SURGICAL HISTORY:  Past Surgical History:   Procedure Laterality Date    HYSTEROSCOPY NOVASURE-2  6/17/2014    Performed by [facility] M.D. at SURGERY SAME DAY [facility]    TUBAL COAGULATION LAPAROSCOPIC BILATERAL  1998 1998    APPENDECTOMY      BOWEL RESECTION      CYSTECTOMY      EXPLORATORY LAPAROTOMY      for  surgical adhesions, polyps    HEMORRHOIDECTOMY      PRIMARY C SECTION         MEDICATIONS:  Current Outpatient Medications   Medication Sig    metoprolol SR (TOPROL XL) 25 MG TABLET SR 24 HR Take 0.5 Tablets by mouth every day.    flecainide (TAMBOCOR) 50 MG tablet Take 1 Tablet by mouth 2 times a day. *Must schedule follow up appointment for further refills.* Please call [phone], thank you!    sumatriptan (IMITREX) 100 MG tablet Take 1 Tablet by mouth one time as needed for Migraine.    aspirin EC (ECOTRIN) 81 MG Tablet Delayed Response Take 1 tablet by mouth every day.    albuterol 108 (90 Base) MCG/ACT Aero Soln inhalation aerosol Inhale 2 Puffs by mouth every four hours as needed (wheezing).    Esomeprazole Magnesium 20 MG Pack Take 20 mg by mouth 2 times a day.       ALLERGIES:   Allergies   Allergen Reactions    Codeine Vomiting, Swelling and Unspecified    Other Environmental Hives     Kiwi fruit       IMMUNIZATIONS:  Immunization History   Administered Date(s) Administered    Tdap Vaccine 2019       SOCIAL HISTORY:   Social History     Socioeconomic History    Marital status:    Tobacco Use    Smoking status: Former     Packs/day: 0.50     Years: 6.00     Pack years: 3.00     Types: Cigarettes     Quit date: 1996     Years since quittin.2    Smokeless tobacco: Never   Vaping Use    Vaping Use: Never used   Substance and Sexual Activity    Alcohol use: Yes     Comment: occ    Drug use: No    Sexual activity: Yes       FAMILY HISTORY:  Family History   Adopted: Yes   Problem Relation Age of Onset    Other Mother         hypothyroid    Diabetes Mother     Hypertension Mother     Hyperlipidemia Mother     Thyroid Mother     No Known Problems Father         Adopted by father    No Known Problems Maternal Grandmother     No Known Problems Maternal Grandfather     No Known Problems Sister         Half sister            Objective     Vital Signs: /70 (BP Location: Left arm, Patient  "Position: Sitting, BP Cuff Size: Adult)   Pulse 91   Temp 36.8 °C (98.3 °F) (Temporal)   Ht 1.549 m (5' 1\")   Wt 97.5 kg (215 lb)   SpO2 97% Body mass index is 40.62 kg/m².    General: Appears well and comfortable  Eyes: No scleral or conjunctival lesions  ENT: No apparent oral or nasal lesions  Head/Neck: No apparent scalp or neck lesions  Cardiovascular: Regular rate and rhythm; no pericardial rubs  Respiratory: Breathing quiet and unlabored; no rales or pleural rubs  Gastrointestinal: No apparent organomegaly or abdominal masses  Integumentary: No significant cutaneous lesions or discolorations  Musculoskeletal: No significant joint tenderness, periarticular soft tissue swelling, warmth, erythema, or overt signs of synovitis; no significant restriction in range of motion of joints examined  Neurologic: No focal sensory or motor deficits  Psychiatric: Mood and affect appropriate      LABORATORY RESULTS REVIEWED AND INTERPRETED BY ME:  Lab Results   Component Value Date/Time    SEDRATEWES 35 (H) 05/19/2022 01:08 PM    CREACTPROT 3.68 (H) 05/19/2022 01:08 PM     Lab Results   Component Value Date/Time    ANTINUCAB None Detected 05/19/2022 01:08 PM     Lab Results   Component Value Date/Time    TSH 1.720 10/16/2019 09:00 PM    TSHULTRASEN 1.660 11/11/2021 07:57 AM    FREEDIR 1.15 10/16/2019 09:00 PM    FREET4 1.17 11/11/2021 07:57 AM     Lab Results   Component Value Date/Time    25HYDROXY 33.1 10/16/2019 09:00 PM    25HYDROXY 16 (L) 02/24/2012 09:58 AM     Lab Results   Component Value Date/Time    WBC 10.20 (H) 02/02/2023 02:32 PM    WBC 9.0 05/19/2022 01:08 PM    RBC 4.56 02/02/2023 02:32 PM    RBC 4.39 05/19/2022 01:08 PM    HEMOGLOBIN 13.8 02/02/2023 02:32 PM    HEMOGLOBIN 13.1 05/19/2022 01:08 PM    HEMATOCRIT 40.9 02/02/2023 02:32 PM    HEMATOCRIT 40.3 05/19/2022 01:08 PM    MCV 89.8 02/02/2023 02:32 PM    MCV 91.8 05/19/2022 01:08 PM    MCH 30.3 02/02/2023 02:32 PM    MCH 29.8 05/19/2022 01:08 PM    " MCHC 33.8 02/02/2023 02:32 PM    MCHC 32.5 (L) 05/19/2022 01:08 PM    RDW 14.9 02/02/2023 02:32 PM    RDW 47.8 05/19/2022 01:08 PM    PLATELETCT 285 02/02/2023 02:32 PM    PLATELETCT 269 05/19/2022 01:08 PM    MPV 9.4 02/02/2023 02:32 PM    MPV 11.2 05/19/2022 01:08 PM    NEUTS 6.5 02/02/2023 02:32 PM    NEUTS 5.77 05/19/2022 01:08 PM    NEUTS 4.7 10/16/2019 09:00 PM    LYMPHOCYTES 27.3 02/02/2023 02:32 PM    LYMPHOCYTES 26.50 05/19/2022 01:08 PM    MONOCYTES 6.9 02/02/2023 02:32 PM    MONOCYTES 8.00 05/19/2022 01:08 PM    EOSINOPHILS 0.7 02/02/2023 02:32 PM    EOSINOPHILS 0.70 05/19/2022 01:08 PM    BASOPHILS 1.3 02/02/2023 02:32 PM    BASOPHILS 0.40 05/19/2022 01:08 PM     Lab Results   Component Value Date/Time    ASTSGOT 12 02/02/2023 02:32 PM    ASTSGOT 14 11/11/2021 07:57 AM    ALTSGPT 15 02/02/2023 02:32 PM    ALTSGPT 18 11/11/2021 07:57 AM    ALKPHOSPHAT 101 02/02/2023 02:32 PM    ALKPHOSPHAT 94 11/11/2021 07:57 AM    TBILIRUBIN 0.4 02/02/2023 02:32 PM    TBILIRUBIN 0.4 11/11/2021 07:57 AM    TOTPROTEIN 8.3 02/02/2023 02:32 PM    TOTPROTEIN 7.6 11/11/2021 07:57 AM    ALBUMIN 5.0 02/02/2023 02:32 PM    ALBUMIN 4.4 11/11/2021 07:57 AM     Lab Results   Component Value Date/Time    SODIUM 141 02/02/2023 02:32 PM    SODIUM 140 11/11/2021 07:57 AM    POTASSIUM 3.8 02/02/2023 02:32 PM    POTASSIUM 4.2 11/11/2021 07:57 AM    CHLORIDE 105 02/02/2023 02:32 PM    CHLORIDE 105 11/11/2021 07:57 AM    CO2 25.0 02/02/2023 02:32 PM    CO2 24 11/11/2021 07:57 AM    GLUCOSE 96 02/02/2023 02:32 PM    GLUCOSE 102 (H) 11/11/2021 07:57 AM    BUN 12.0 02/02/2023 02:32 PM    BUN 17 11/11/2021 07:57 AM    CREATININE 0.9 02/02/2023 02:32 PM    CREATININE 0.98 11/11/2021 07:57 AM    BUNCREATRAT 13.3 02/02/2023 02:32 PM    BUNCREATRAT 15 10/16/2019 09:00 PM    CALCIUM 9.7 02/02/2023 02:32 PM    CALCIUM 9.0 11/11/2021 07:57 AM     Lab Results   Component Value Date/Time    HEPBSAG Non-Reactive 05/19/2022 01:08 PM     Lab Results    Component Value Date/Time    CHOLSTRLTOT 216 (H) 11/11/2021 07:57 AM     (H) 11/11/2021 07:57 AM    HDL 66 11/11/2021 07:57 AM    TRIGLYCERIDE 97 11/11/2021 07:57 AM    HBA1C 5.4 02/03/2023 05:49 AM    HBA1C 5.3 10/16/2019 09:00 PM       RADIOLOGY RESULTS REVIEWED AND INTERPRETED BY ME:  No loadable results found in the system. Pertinent non-system results, if applicable, summarized under history above.      All relevant laboratory and imaging results reported on this note were reviewed and interpreted by me.         Assessment & Plan     Ashley Johansen is a 57 y.o. female with history as noted above whose presentation merits the following diagnostic and clinical status impressions and recommendations:    1. Rash and nonspecific skin eruption  Clinical picture in the context of her persistently elevated inflammatory markers raises concern for possible underlying limited cutaneous vasculitis (such as hypocomplementemic urticarial vasculitis) or autoimmune inflammatory process. Reasonable to undertake further investigation with the workup noted below for exclusionary and risk stratification purposes based on which additional recommendations may be provided.  - COMPLEMENT C1Q, QUANTITATIVE  - COMPLEMENT C3; Future  - COMPLEMENT C4; Future  - COMPLEMENT TOTAL (CH50); Future  - Sed Rate; Future  - CRP QUANTITIVE (NON-CARDIAC); Future    2. Arthralgia of multiple joints  Presentation has features of both inflammatory arthritis (such as rheumatoid arthritis) and biomechanical arthralgia (such as osteoarthritis). Reasonable to undertake further investigation with the workup noted below for exclusionary and risk stratification purposes.  - RHEUMATOID ARTHRITIS FACTOR; Future  - CCP ANTIBODY; Future      FOLLOW-UP: Return for follow-up TBD.         Thank you for the opportunity to participate in the care of Ashley Johansen.    Johnson Woods MD, MS  Rheumatologist, [facility] ? [facility]   of Clinical Medicine, Department of Internal Medicine  [facility], [employer] [facility]

## 2023-03-11 NOTE — PATIENT INSTRUCTIONS
AFTER VISIT INSTRUCTIONS    Below are important information to help you navigate your healthcare needs and help us serve you safely and effectively:  If laboratory tests and/or imaging studies were ordered, remember to go get them done as instructed.  If new prescriptions and/or refills were sent, remember to go pick them up from your local pharmacy, or call the specialty pharmacy to request shipment.  Always take your prescription medications exactly as prescribed unless instructed otherwise.  Note that antirheumatic drugs and steroids are immunosuppressive which means they increase your risk of infections and have multiple potential adverse effects on various organ systems in your body, though most of them are uncommon.  It is important that you are up-to-date on age-appropriate immunizations, particularly shingles and bacterial/viral pneumonia vaccines, which you can request from me or your primary care provider.  Be sure to read the drug package inserts to learn about the potential side effects of your medications before you start taking them.  If you experience any significant drug side effects, stop taking the medication and notify me promptly, and depending on the severity of the side effects, consider going to an urgent care or emergency department for immediate attention.  If there are significant findings on your lab tests and imaging studies that warrant further action, I will notify you with explanations via [employer] or phone call, otherwise you can view them on [facility] and let me know if you have any questions.  Note that [facility] [employer] messages are typically read during office hours and may take 1-7 business days before a response depending on the urgency of the situation and how busy my clinic schedule is.  In general, [facility] messaging is for non-urgent matters that do not require immediate attention, so for urgent matters that cannot wait, you are advised to go to an urgent care.  Lastly, you are granted  MyChart access to my documentation of your visit and are encouraged to read my note which details my assessment and plan for your condition.

## 2023-04-12 DIAGNOSIS — I48.0 PAF (PAROXYSMAL ATRIAL FIBRILLATION) (HCC): ICD-10-CM

## 2023-04-13 RX ORDER — METOPROLOL SUCCINATE 25 MG/1
12.5 TABLET, EXTENDED RELEASE ORAL DAILY
Qty: 45 TABLET | Refills: 3 | Status: SHIPPED | OUTPATIENT
Start: 2023-04-13 | End: 2023-09-11 | Stop reason: SDUPTHER

## 2023-04-13 NOTE — TELEPHONE ENCOUNTER
Is the patient due for a refill? Yes    Was the patient seen the past year? Yes    Date of last office visit: 3/8/2023    Does the patient have an upcoming appointment?  Yes   If yes, When? 9/11/2023    Provider to refill:VR    Does the patients insurance require a 100 day supply?  No

## 2023-05-01 ENCOUNTER — PATIENT MESSAGE (OUTPATIENT)
Dept: CARDIOLOGY | Facility: MEDICAL CENTER | Age: 58
End: 2023-05-01
Payer: COMMERCIAL

## 2023-05-05 ENCOUNTER — TELEPHONE (OUTPATIENT)
Dept: CARDIOLOGY | Facility: MEDICAL CENTER | Age: 58
End: 2023-05-05
Payer: COMMERCIAL

## 2023-05-05 VITALS — DIASTOLIC BLOOD PRESSURE: 84 MMHG | SYSTOLIC BLOOD PRESSURE: 128 MMHG

## 2023-05-05 NOTE — TELEPHONE ENCOUNTER
Home readings sent via [employer]  Entered into flowsheets    4/23  /69  4/24 /79  4/25 /81  /86  4/26 /84  /105  4/27 /81  /95  4/28 /82  /87  4/29 Noon 126/90  May 1 Noon 128/84

## 2023-05-14 RX ORDER — AMLODIPINE BESYLATE 5 MG/1
5 TABLET ORAL DAILY
Qty: 30 TABLET | Refills: 3 | Status: SHIPPED | OUTPATIENT
Start: 2023-05-14 | End: 2023-09-11

## 2023-05-15 ENCOUNTER — APPOINTMENT (OUTPATIENT)
Dept: RADIOLOGY | Facility: IMAGING CENTER | Age: 58
End: 2023-05-15
Attending: FAMILY MEDICINE
Payer: COMMERCIAL

## 2023-05-15 ENCOUNTER — OFFICE VISIT (OUTPATIENT)
Dept: URGENT CARE | Facility: PHYSICIAN GROUP | Age: 58
End: 2023-05-15
Payer: COMMERCIAL

## 2023-05-15 VITALS
OXYGEN SATURATION: 97 % | HEIGHT: 61 IN | SYSTOLIC BLOOD PRESSURE: 120 MMHG | HEART RATE: 75 BPM | WEIGHT: 205 LBS | DIASTOLIC BLOOD PRESSURE: 68 MMHG | RESPIRATION RATE: 15 BRPM | TEMPERATURE: 97.9 F | BODY MASS INDEX: 38.71 KG/M2

## 2023-05-15 DIAGNOSIS — R05.9 COUGH, UNSPECIFIED TYPE: ICD-10-CM

## 2023-05-15 PROCEDURE — 99214 OFFICE O/P EST MOD 30 MIN: CPT | Performed by: FAMILY MEDICINE

## 2023-05-15 PROCEDURE — 3074F SYST BP LT 130 MM HG: CPT | Performed by: FAMILY MEDICINE

## 2023-05-15 PROCEDURE — 1125F AMNT PAIN NOTED PAIN PRSNT: CPT | Performed by: FAMILY MEDICINE

## 2023-05-15 PROCEDURE — 3078F DIAST BP <80 MM HG: CPT | Performed by: FAMILY MEDICINE

## 2023-05-15 PROCEDURE — 71046 X-RAY EXAM CHEST 2 VIEWS: CPT | Mod: TC,FY | Performed by: STUDENT IN AN ORGANIZED HEALTH CARE EDUCATION/TRAINING PROGRAM

## 2023-05-15 RX ORDER — ONDANSETRON 4 MG/1
TABLET, ORALLY DISINTEGRATING ORAL
COMMUNITY
Start: 2023-04-07 | End: 2023-08-25

## 2023-05-15 RX ORDER — DEXAMETHASONE INTENSOL 1 MG/ML
SOLUTION, CONCENTRATE ORAL
COMMUNITY
Start: 2023-04-07 | End: 2023-08-25

## 2023-05-15 RX ORDER — HYDROCODONE BITARTRATE AND ACETAMINOPHEN 5; 325 MG/1; MG/1
TABLET ORAL
COMMUNITY
Start: 2023-04-07 | End: 2023-08-25

## 2023-05-15 RX ORDER — BENZONATATE 200 MG/1
200 CAPSULE ORAL 3 TIMES DAILY PRN
Qty: 45 CAPSULE | Refills: 0 | Status: SHIPPED | OUTPATIENT
Start: 2023-05-15 | End: 2023-09-11

## 2023-05-15 RX ORDER — CELECOXIB 200 MG/1
CAPSULE ORAL
COMMUNITY
Start: 2023-03-30

## 2023-05-15 RX ORDER — FLUTICASONE PROPIONATE 50 MCG
1 SPRAY, SUSPENSION (ML) NASAL 2 TIMES DAILY
Qty: 16 G | Refills: 0 | Status: SHIPPED | OUTPATIENT
Start: 2023-05-15 | End: 2023-05-22

## 2023-05-15 ASSESSMENT — FIBROSIS 4 INDEX: FIB4 SCORE: 0.84

## 2023-05-15 NOTE — PROGRESS NOTES
Chief Complaint   Patient presents with    Cough               cough  This is a new problem. The current episode started 1 mth ago. The problem has been unchanged. The problem occurs constantly , but worse at night.  The cough is dry.   Denies headache or nasal congestion or sinus pain.    Pertinent negatives include no  Fevers,  nausea, vomiting, diarrhea, sweats, weight loss or wheezing. Nothing aggravates the symptoms.  Patient has tried nothing for the symptoms. There is no history of asthma.        Social History     Tobacco Use    Smoking status: Former     Packs/day: 0.50     Years: 6.00     Pack years: 3.00     Types: Cigarettes     Quit date: 1996     Years since quittin.3    Smokeless tobacco: Never   Vaping Use    Vaping Use: Never used   Substance Use Topics    Alcohol use: Yes     Comment: occ    Drug use: No           Past Medical History:   Diagnosis Date    PAF (paroxysmal atrial fibrillation) ([facility]) 2020    Infectious disease 2014    step throat    Allergy     Anesthesia     clautraphobic unable to tolerated mask    Apnea, sleep     Arthritis     hands and feet    Daytime sleepiness     sometimes    Gasping for breath     Hemorrhoids     Indigestion     Insomnia     Migraines     Morning headache     Pleurisy          Current Outpatient Medications on File Prior to Visit   Medication Sig Dispense Refill    celecoxib (CELEBREX) 200 MG Cap       HYDROcodone-acetaminophen (NORCO) 5-325 MG Tab per tablet       metoprolol tartrate (LOPRESSOR) 25 MG Tab Take 12.5 mg by mouth every day.      ondansetron (ZOFRAN ODT) 4 MG TABLET DISPERSIBLE       amLODIPine (NORVASC) 5 MG Tab Take 1 Tablet by mouth every day. 30 Tablet 3    metoprolol SR (TOPROL XL) 25 MG TABLET SR 24 HR Take 0.5 Tablets by mouth every day. 45 Tablet 3    flecainide (TAMBOCOR) 50 MG tablet Take 1 Tablet by mouth 2 times a day. Must attend 23 appt for further refills 180 Tablet 1    sumatriptan (IMITREX) 100 MG tablet  "Take 1 Tablet by mouth one time as needed for Migraine. 10 Tablet 3    aspirin EC (ECOTRIN) 81 MG Tablet Delayed Response Take 1 tablet by mouth every day. 30 tablet     albuterol 108 (90 Base) MCG/ACT Aero Soln inhalation aerosol Inhale 2 Puffs by mouth every four hours as needed (wheezing). 1 Inhaler 0    DEXAMETHASONE INTENSOL 1 MG/ML Conc  (Patient not taking: Reported on 5/15/2023)      Esomeprazole Magnesium 20 MG Pack Take 20 mg by mouth 2 times a day. (Patient not taking: Reported on 5/15/2023)       No current facility-administered medications on file prior to visit.         Review of Systems   Constitutional: Negative for fever and weight loss.   HENT: negative for otalgia  Cardiovascular - denies chest pain or dyspnea  Respiratory: Positive for cough.  .  Negative for wheezing.    Neurological: Negative for headaches.   GI - denies nausea, vomiting or diarrhea  Neuro - denies numbness or tingling.            Objective:     /68   Pulse 75   Temp 36.6 °C (97.9 °F) (Temporal)   Resp 15   Ht 1.549 m (5' 1\")   Wt 93 kg (205 lb)   SpO2 97%       Physical Exam   Constitutional: patient is oriented to person, place, and time. Patient appears well-developed and well-nourished. No distress.   HENT:   Head: Normocephalic and atraumatic.   Right Ear: External ear normal.   Left Ear: External ear normal.   Nose: Mucosal edema and clear postnasal drip present. Right sinus exhibits no maxillary sinus tenderness. Left sinus exhibits no maxillary sinus tenderness.   Mouth/Throat: Mucous membranes are normal. No oral lesions.  No posterior pharyngeal erythema.  No oropharyngeal exudate or posterior oropharyngeal edema.   Eyes: Conjunctivae and EOM are normal. Pupils are equal, round, and reactive to light. Right eye exhibits no discharge. Left eye exhibits no discharge. No scleral icterus.   Neck: Normal range of motion. Neck supple. No tracheal deviation present.   Cardiovascular: Normal rate, regular rhythm " and normal heart sounds.  Exam reveals no friction rub.    Pulmonary/Chest: Effort normal. No respiratory distress. Patient has no wheezes or rhonchi. Patient has no rales.    Musculoskeletal:  exhibits no edema.   Lymphadenopathy:     Patient has no cervical adenopathy.      Neurological: patient is alert and oriented to person, place, and time.   Skin: Skin is warm and dry. No rash noted. No erythema.   Psychiatric: patient  has a normal mood and affect.  behavior is normal.   Nursing note and vitals reviewed.         Details    Reading Physician Reading Date Result Priority   Esau Terrazas M.D.  [phone] 5/15/2023 Urgent Care     Narrative & Impression   Chest radiographs, 2 views.  5/15/2023 2:06 PM     HISTORY/REASON FOR EXAM:  cough; Cough.     COMPARISON:  March 19, 2020     FINDINGS:  The lungs are clear. No consolidation, edema, effusion, or pneumothorax is noted. Cardiac silhouette is within normal limits in size and unremarkable. Osseous structures and soft tissues are unremarkable.     IMPRESSION:        No acute cardiopulmonary process.           Exam Ended: 05/15/23  2:14 PM Last Resulted: 05/15/23  2:16 PM              Assessment/Plan:       1. Postnasal drip      Likely secondary to allergies.        Chest x-ray was personally interpreted and reviewed. No acute cardiopulmonary findings. No pulmonary infiltrates or densities. Cardiac silhouette is normal. No hemidiaphragm elevation. No bony abnormalities.       - fluticasone (FLONASE) 50 MCG/ACT nasal spray; Spray 1 Spray in nose 2 times a day.  Dispense: 1 Bottle; Refill: 0     Follow up in one week if no improvement, sooner if symptoms worsen.         My total time spent caring for the patient on the day of the encounter was 30 minutes.   This does not include time spent on separately billable procedures/tests.

## 2023-08-25 ENCOUNTER — OFFICE VISIT (OUTPATIENT)
Dept: URGENT CARE | Facility: PHYSICIAN GROUP | Age: 58
End: 2023-08-25
Payer: COMMERCIAL

## 2023-08-25 VITALS
OXYGEN SATURATION: 96 % | DIASTOLIC BLOOD PRESSURE: 82 MMHG | SYSTOLIC BLOOD PRESSURE: 110 MMHG | TEMPERATURE: 98.2 F | RESPIRATION RATE: 18 BRPM | HEART RATE: 83 BPM | BODY MASS INDEX: 37.08 KG/M2 | WEIGHT: 196.4 LBS | HEIGHT: 61 IN

## 2023-08-25 DIAGNOSIS — H10.33 ACUTE BACTERIAL CONJUNCTIVITIS OF BOTH EYES: ICD-10-CM

## 2023-08-25 PROCEDURE — 1126F AMNT PAIN NOTED NONE PRSNT: CPT | Performed by: FAMILY MEDICINE

## 2023-08-25 PROCEDURE — 3079F DIAST BP 80-89 MM HG: CPT | Performed by: FAMILY MEDICINE

## 2023-08-25 PROCEDURE — 99213 OFFICE O/P EST LOW 20 MIN: CPT | Performed by: FAMILY MEDICINE

## 2023-08-25 PROCEDURE — 3074F SYST BP LT 130 MM HG: CPT | Performed by: FAMILY MEDICINE

## 2023-08-25 RX ORDER — OFLOXACIN 3 MG/ML
SOLUTION/ DROPS OPHTHALMIC
Qty: 5 ML | Refills: 1 | Status: SHIPPED | OUTPATIENT
Start: 2023-08-25 | End: 2023-09-11

## 2023-08-25 ASSESSMENT — FIBROSIS 4 INDEX: FIB4 SCORE: 0.84

## 2023-08-25 ASSESSMENT — PAIN SCALES - GENERAL: PAINLEVEL: NO PAIN

## 2023-08-25 NOTE — PROGRESS NOTES
Chief Complaint:    Chief Complaint   Patient presents with    Conjunctivitis     Itchy and redness on right eye then has gone to the left. Discharge from eye        History of Present Illness:    Irritation, redness, and AM crusting of both eyes, started in right eye first few days ago, today left eye is affected. No contact lens use, foreign body to eye, or change in vision.       Past Medical History:    Past Medical History:   Diagnosis Date    Allergy     Anesthesia     clautraphobic unable to tolerated mask    Apnea, sleep     Arthritis     hands and feet    Daytime sleepiness     sometimes    Gasping for breath     Hemorrhoids     Indigestion     Infectious disease 2014    step throat    Insomnia     Migraines     Morning headache     PAF (paroxysmal atrial fibrillation) ([facility]) 2020    Pleurisy      Past Surgical History:    Past Surgical History:   Procedure Laterality Date    HYSTEROSCOPY NOVASURE-2  2014    Performed by [facility] Thomason M.D. at SURGERY SAME DAY [facility] ORS    TUBAL COAGULATION LAPAROSCOPIC BILATERAL  1998    APPENDECTOMY      BOWEL RESECTION      CYSTECTOMY      EXPLORATORY LAPAROTOMY      for surgical adhesions, polyps    HEMORRHOIDECTOMY      PRIMARY C SECTION       Social History:    Social History     Socioeconomic History    Marital status:      Spouse name: Not on file    Number of children: Not on file    Years of education: Not on file    Highest education level: Not on file   Occupational History    Not on file   Tobacco Use    Smoking status: Former     Current packs/day: 0.00     Average packs/day: 0.5 packs/day for 6.0 years (3.0 ttl pk-yrs)     Types: Cigarettes     Start date: 1990     Quit date: 1996     Years since quittin.6    Smokeless tobacco: Never   Vaping Use    Vaping Use: Never used   Substance and Sexual Activity    Alcohol use: Yes     Comment: occ    Drug use: No    Sexual activity: Yes   Other Topics Concern    Not on  file   Social History Narrative    Not on file     Social Determinants of Health     Financial Resource Strain: Not on file   Food Insecurity: Not on file   Transportation Needs: Not on file   Physical Activity: Not on file   Stress: Not on file   Social Connections: Not on file   Intimate Partner Violence: Not on file   Housing Stability: Not on file     Family History:    Family History   Adopted: Yes   Problem Relation Age of Onset    Other Mother         hypothyroid    Diabetes Mother     Hypertension Mother     Hyperlipidemia Mother     Thyroid Mother     No Known Problems Father         Adopted by father    No Known Problems Maternal Grandmother     No Known Problems Maternal Grandfather     No Known Problems Sister         Half sister     Medications:    Current Outpatient Medications on File Prior to Visit   Medication Sig Dispense Refill    celecoxib (CELEBREX) 200 MG Cap       benzonatate (TESSALON) 200 MG capsule Take 1 Capsule by mouth 3 times a day as needed for Cough. 45 Capsule 0    amLODIPine (NORVASC) 5 MG Tab Take 1 Tablet by mouth every day. 30 Tablet 3    metoprolol SR (TOPROL XL) 25 MG TABLET SR 24 HR Take 0.5 Tablets by mouth every day. 45 Tablet 3    flecainide (TAMBOCOR) 50 MG tablet Take 1 Tablet by mouth 2 times a day. Must attend 9/11/23 appt for further refills 180 Tablet 1    sumatriptan (IMITREX) 100 MG tablet Take 1 Tablet by mouth one time as needed for Migraine. 10 Tablet 3    aspirin EC (ECOTRIN) 81 MG Tablet Delayed Response Take 1 tablet by mouth every day. 30 tablet     albuterol 108 (90 Base) MCG/ACT Aero Soln inhalation aerosol Inhale 2 Puffs by mouth every four hours as needed (wheezing). 1 Inhaler 0     No current facility-administered medications on file prior to visit.     Allergies:    Allergies   Allergen Reactions    Kiwi Extract Hives    Codeine Vomiting, Swelling and Unspecified    Other Environmental Hives     Kiwi fruit       Vitals:    Vitals:    08/25/23 1236   BP:  "110/82   Pulse: 83   Resp: 18   Temp: 36.8 °C (98.2 °F)   TempSrc: Temporal   SpO2: 96%   Weight: 89.1 kg (196 lb 6.4 oz)   Height: 1.549 m (5' 1\")       Physical Exam:    Constitutional: Vital signs reviewed. Appears well-developed and well-nourished. No acute distress.   Eyes: Bilateral conjunctivae are mildly injected, R>L. PERRLA.  ENT: External ears normal. Hearing normal.   Neck: Neck supple.   Pulmonary/Chest: Respirations non-labored.   Musculoskeletal: Normal gait. No muscular atrophy or weakness.  Neurological: Alert and oriented to person, place, and time. CN 2-12 intact. Muscle tone normal. Coordination normal.   Skin: No rashes or lesions. Warm, dry, normal turgor.  Psychiatric: Normal mood and affect. Behavior is normal. Judgment and thought content normal.       Assessment / Plan & Medical Decision Makin. Acute bacterial conjunctivitis of both eyes  - ofloxacin (OCUFLOX) 0.3 % Solution; 1-2 DROPS TO BOTH EYES EVERY 2-4 HOURS WHILE AWAKE. USE UNTIL BETTER AND FOR ONE EXTRA DAY AFTER BETTER.  Dispense: 5 mL; Refill: 1       Discussed with her DDX, management options, and risks, benefits, and alternatives to treatment plan agreed upon.    Irritation, redness, and AM crusting of both eyes, started in right eye first few days ago, today left eye is affected. No contact lens use, foreign body to eye, or change in vision.     Bilateral conjunctivae are mildly injected, R>L.     Agreeable to medication prescribed.    Recommended good hand hygiene, wash linens and towels.    Discussed expected course of duration, time for improvement, and to seek follow-up in Emergency Room, urgent care, or with PCP if getting worse at any time or not improving within expected time frame.   "

## 2023-09-11 ENCOUNTER — OFFICE VISIT (OUTPATIENT)
Dept: CARDIOLOGY | Facility: MEDICAL CENTER | Age: 58
End: 2023-09-11
Attending: INTERNAL MEDICINE
Payer: COMMERCIAL

## 2023-09-11 VITALS
SYSTOLIC BLOOD PRESSURE: 110 MMHG | RESPIRATION RATE: 12 BRPM | WEIGHT: 194 LBS | HEIGHT: 61 IN | DIASTOLIC BLOOD PRESSURE: 74 MMHG | OXYGEN SATURATION: 96 % | HEART RATE: 65 BPM | BODY MASS INDEX: 36.63 KG/M2

## 2023-09-11 DIAGNOSIS — G90.01 CAROTID SINUS SYNCOPE: ICD-10-CM

## 2023-09-11 DIAGNOSIS — I48.0 PAF (PAROXYSMAL ATRIAL FIBRILLATION) (HCC): ICD-10-CM

## 2023-09-11 LAB — EKG IMPRESSION: NORMAL

## 2023-09-11 PROCEDURE — 93005 ELECTROCARDIOGRAM TRACING: CPT | Performed by: INTERNAL MEDICINE

## 2023-09-11 PROCEDURE — 93010 ELECTROCARDIOGRAM REPORT: CPT | Performed by: INTERNAL MEDICINE

## 2023-09-11 PROCEDURE — 99417 PROLNG OP E/M EACH 15 MIN: CPT | Performed by: INTERNAL MEDICINE

## 2023-09-11 PROCEDURE — 99212 OFFICE O/P EST SF 10 MIN: CPT | Performed by: INTERNAL MEDICINE

## 2023-09-11 PROCEDURE — 3074F SYST BP LT 130 MM HG: CPT | Performed by: INTERNAL MEDICINE

## 2023-09-11 PROCEDURE — 99215 OFFICE O/P EST HI 40 MIN: CPT | Mod: 25 | Performed by: INTERNAL MEDICINE

## 2023-09-11 PROCEDURE — 3078F DIAST BP <80 MM HG: CPT | Performed by: INTERNAL MEDICINE

## 2023-09-11 RX ORDER — METOPROLOL SUCCINATE 25 MG/1
25 TABLET, EXTENDED RELEASE ORAL DAILY
Qty: 90 TABLET | Refills: 3 | Status: SHIPPED | OUTPATIENT
Start: 2023-09-11

## 2023-09-11 ASSESSMENT — ENCOUNTER SYMPTOMS
PND: 0
PALPITATIONS: 0
WEIGHT LOSS: 0
DIZZINESS: 0
ABDOMINAL PAIN: 0
BACK PAIN: 0
IRREGULAR HEARTBEAT: 0
NAUSEA: 0
CLAUDICATION: 0
WEIGHT GAIN: 0
COUGH: 0
FLANK PAIN: 0
NEAR-SYNCOPE: 0
DEPRESSION: 0
DIARRHEA: 0
SYNCOPE: 0
ORTHOPNEA: 0
ALTERED MENTAL STATUS: 0
SHORTNESS OF BREATH: 0
VOMITING: 0
BLURRED VISION: 0
FEVER: 0
HEARTBURN: 0
DYSPNEA ON EXERTION: 0
CONSTIPATION: 0
DECREASED APPETITE: 0

## 2023-09-11 ASSESSMENT — FIBROSIS 4 INDEX: FIB4 SCORE: 0.84

## 2023-09-11 NOTE — PATIENT INSTRUCTIONS
Please attempt holding amlodipine. Check blood pressure morning and evening for two weeks and send results.

## 2023-09-11 NOTE — PROGRESS NOTES
Cardiology Note    Chief Complaint   Patient presents with    Atrial Fibrillation     FV Dx; PAF       History of Present Illness: Ashley Johansen is a 57 y.o. 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     Feels much better since resection carotid body tumor. Resolved dizziness and fog. Improved physical stamina. No palpitations. Blood pressure stable at home. No other cardiac complaints. Compliant with medications and denies adverse effects.     Review of Systems   Constitutional: Negative for decreased appetite, fever, malaise/fatigue, weight gain and weight loss.   HENT:  Negative for congestion and nosebleeds.    Eyes:  Negative for blurred vision.   Cardiovascular:  Negative for chest pain, claudication, dyspnea on exertion, irregular heartbeat, leg swelling, near-syncope, orthopnea, palpitations, paroxysmal nocturnal dyspnea and syncope.   Respiratory:  Negative for cough and shortness of breath.    Endocrine: Negative for cold intolerance and heat intolerance.   Skin:  Negative for rash.   Musculoskeletal:  Negative for back pain.   Gastrointestinal:  Negative for abdominal pain, constipation, diarrhea, heartburn, melena, nausea and vomiting.   Genitourinary:  Negative for dysuria, flank pain and hematuria.   Neurological:  Negative for dizziness.   Psychiatric/Behavioral:  Negative for altered mental status and depression.          Past Medical History:   Diagnosis Date    Allergy     Anesthesia     clautraphobic unable to tolerated mask    Apnea, sleep     Arthritis     hands and feet    Daytime sleepiness     sometimes    Gasping for breath     Hemorrhoids     Indigestion     Infectious disease 5/2014    step throat    Insomnia     Migraines     Morning headache     PAF (paroxysmal atrial fibrillation) ([facility]) 6/24/2020    Pleurisy          Past Surgical History:   Procedure Laterality Date    HYSTEROSCOPY NOVASURE-2   6/17/2014    Performed by [facility] Thomason M.D. at SURGERY SAME DAY [facility] ORS    TUBAL COAGULATION LAPAROSCOPIC BILATERAL  1998 1998    APPENDECTOMY      BOWEL RESECTION      CYSTECTOMY      EXPLORATORY LAPAROTOMY      for surgical adhesions, polyps    HEMORRHOIDECTOMY      PRIMARY C SECTION           Current Outpatient Medications   Medication Sig Dispense Refill    metoprolol SR (TOPROL XL) 25 MG TABLET SR 24 HR Take 1 Tablet by mouth every day. 90 Tablet 3    celecoxib (CELEBREX) 200 MG Cap       flecainide (TAMBOCOR) 50 MG tablet Take 1 Tablet by mouth 2 times a day. Must attend 9/11/23 appt for further refills 180 Tablet 1    sumatriptan (IMITREX) 100 MG tablet Take 1 Tablet by mouth one time as needed for Migraine. 10 Tablet 3    aspirin EC (ECOTRIN) 81 MG Tablet Delayed Response Take 1 tablet by mouth every day. 30 tablet     albuterol 108 (90 Base) MCG/ACT Aero Soln inhalation aerosol Inhale 2 Puffs by mouth every four hours as needed (wheezing). 1 Inhaler 0     No current facility-administered medications for this visit.         Allergies   Allergen Reactions    Kiwi Extract Hives    Codeine Vomiting, Swelling and Unspecified    Other Environmental Hives     Kiwi fruit         Family History   Adopted: Yes   Problem Relation Age of Onset    Other Mother         hypothyroid    Diabetes Mother     Hypertension Mother     Hyperlipidemia Mother     Thyroid Mother     No Known Problems Father         Adopted by father    No Known Problems Maternal Grandmother     No Known Problems Maternal Grandfather     No Known Problems Sister         Half sister         Social History     Socioeconomic History    Marital status:      Spouse name: Not on file    Number of children: Not on file    Years of education: Not on file    Highest education level: Not on file   Occupational History    Not on file   Tobacco Use    Smoking status: Former     Current packs/day: 0.00     Average packs/day: 0.5 packs/day for  "6.0 years (3.0 ttl pk-yrs)     Types: Cigarettes     Start date: 1990     Quit date: 1996     Years since quittin.7    Smokeless tobacco: Never   Vaping Use    Vaping Use: Never used   Substance and Sexual Activity    Alcohol use: Yes     Comment: occ    Drug use: No    Sexual activity: Yes   Other Topics Concern    Not on file   Social History Narrative    Not on file     Social Determinants of Health     Financial Resource Strain: Not on file   Food Insecurity: Not on file   Transportation Needs: Not on file   Physical Activity: Not on file   Stress: Not on file   Social Connections: Not on file   Intimate Partner Violence: Not on file   Housing Stability: Not on file         Physical Exam:  Ambulatory Vitals  /74 (BP Location: Left arm, Patient Position: Sitting, BP Cuff Size: Large adult)   Pulse 65   Resp 12   Ht 1.549 m (5' 1\")   Wt 88 kg (194 lb)   SpO2 96%    BP Readings from Last 4 Encounters:   23 110/74   23 110/82   05/15/23 120/68   23 128/84     Weight/BMI:   Vitals:    23 1012   BP: 110/74   Weight: 88 kg (194 lb)   Height: 1.549 m (5' 1\")    Body mass index is 36.66 kg/m².  Wt Readings from Last 4 Encounters:   23 88 kg (194 lb)   23 89.1 kg (196 lb 6.4 oz)   05/15/23 93 kg (205 lb)   03/10/23 97.5 kg (215 lb)       Physical Exam  Constitutional:       General: She is not in acute distress.  HENT:      Head: Normocephalic and atraumatic.   Eyes:      Conjunctiva/sclera: Conjunctivae normal.      Pupils: Pupils are equal, round, and reactive to light.   Neck:      Vascular: No JVD.   Cardiovascular:      Rate and Rhythm: Normal rate and regular rhythm.      Heart sounds: Normal heart sounds. No murmur heard.     No friction rub. No gallop.   Pulmonary:      Effort: Pulmonary effort is normal. No respiratory distress.      Breath sounds: Normal breath sounds. No wheezing or rales.   Chest:      Chest wall: No tenderness.   Abdominal:      " "General: Bowel sounds are normal. There is no distension.      Palpations: Abdomen is soft.   Musculoskeletal:      Cervical back: Normal range of motion and neck supple.   Skin:     General: Skin is warm and dry.   Neurological:      Mental Status: She is alert and oriented to person, place, and time.   Psychiatric:         Mood and Affect: Affect normal.         Judgment: Judgment normal.         Lab Data Review:  Lab Results   Component Value Date/Time    CHOLSTRLTOT 216 (H) 11/11/2021 07:57 AM     (H) 11/11/2021 07:57 AM    HDL 66 11/11/2021 07:57 AM    TRIGLYCERIDE 97 11/11/2021 07:57 AM       Lab Results   Component Value Date/Time    SODIUM 140 04/07/2023 04:47 AM    SODIUM 140 11/11/2021 07:57 AM    POTASSIUM 4.4 04/07/2023 04:47 AM    POTASSIUM 4.2 11/11/2021 07:57 AM    CHLORIDE 105 04/07/2023 04:47 AM    CHLORIDE 105 11/11/2021 07:57 AM    CO2 25.0 04/07/2023 04:47 AM    CO2 24 11/11/2021 07:57 AM    GLUCOSE 128 04/07/2023 04:47 AM    GLUCOSE 102 (H) 11/11/2021 07:57 AM    BUN 14.0 04/07/2023 04:47 AM    BUN 17 11/11/2021 07:57 AM    CREATININE 0.9 04/07/2023 04:47 AM    CREATININE 0.98 11/11/2021 07:57 AM    BUNCREATRAT 15.6 04/07/2023 04:47 AM    BUNCREATRAT 15 10/16/2019 09:00 PM     CrCl cannot be calculated (Patient's most recent lab result is older than the maximum 7 days allowed.).  Lab Results   Component Value Date/Time    ALKPHOSPHAT 101 02/02/2023 02:32 PM    ALKPHOSPHAT 94 11/11/2021 07:57 AM    ASTSGOT 12 02/02/2023 02:32 PM    ASTSGOT 14 11/11/2021 07:57 AM    ALTSGPT 15 02/02/2023 02:32 PM    ALTSGPT 18 11/11/2021 07:57 AM    TBILIRUBIN 0.4 02/02/2023 02:32 PM    TBILIRUBIN 0.4 11/11/2021 07:57 AM      Lab Results   Component Value Date/Time    WBC 13.30 (H) 04/07/2023 04:47 AM    WBC 9.0 05/19/2022 01:08 PM     Lab Results   Component Value Date/Time    HBA1C 5.2 04/06/2023 04:54 AM    HBA1C 5.3 10/16/2019 09:00 PM     No components found for: \"TROP\"      Cardiac Imaging and " Procedures Review:      EKG 9/11/23 interpreted by me sinus elia 58  EKG 3/8/23 interpreted by me sinus, low voltage    ZioPatch Summary: 4-  1. Sinus rhythm with appropriate heart rate range. Average 86, range 46 to 250 bpm.   2. Normal sinus node and AV node conduction normal. No pauses.   3. Rare PACs.   4. Rare PVCs.   5. Paroxysmal atrial fibrillation, <1% burden, up to 7.5 minutes. Frequent SVT, up to 2 mins, up to 250 bpm.   6. 125 pt triggers during sinus, PAF, SVT, PVCs. Symptoms reported include racing, fluttering.   Conclusion: Sinus with frequent symptomatic fast SVT, paroxsymal atrial fibrillation.      Echo 4-  No prior study is available for comparison.   Left ventricular ejection fraction is visually estimated to be 65%.  Normal left atrial size.  No significant valve or doppler abnormality.     Nuclear stress test January 24, 2017  Exercised 6 minutes and 50 seconds achieving 7 METS, no concerning issues  Normal perfusion    Result Date: 2/2/2023 outside study [facility]  SMALL FOCUS OF OLD HEMORRHAGE IN THE LEFT POSTERIOR PARIETAL CENTRUM SEMIOVALE DEEP WHITE MATTER. OTHERWISE NEGATIVE EXAM. NO ACUTE ABNORMALITY.    Medical Decision Making:  Problem List Items Addressed This Visit       PAF (paroxysmal atrial fibrillation) (HCC)    Relevant Medications    metoprolol SR (TOPROL XL) 25 MG TABLET SR 24 HR    Other Relevant Orders    EKG (Completed)    Carotid sinus syncope    Relevant Medications    metoprolol SR (TOPROL XL) 25 MG TABLET SR 24 HR     Elevated blood pressure. Attempt hold amlodipine and repeat blood pressure at home.    Paroxysmal AF - controlled. Attempt metoprolol 25mg. Continue flecainide. If hypertension persists then elevated chadsvasc change aspirin to eliquis 5mg bid.     It was my pleasure to meet with Ms. Johansen.    A total of 65 minutes of time was spent on day of encounter reviewing medical record, performing history and examination, counseling, ordering  medication/test/consults and documentation.

## 2023-10-21 ENCOUNTER — OFFICE VISIT (OUTPATIENT)
Dept: URGENT CARE | Facility: PHYSICIAN GROUP | Age: 58
End: 2023-10-21
Payer: COMMERCIAL

## 2023-10-21 ENCOUNTER — HOSPITAL ENCOUNTER (OUTPATIENT)
Facility: MEDICAL CENTER | Age: 58
End: 2023-10-21
Attending: NURSE PRACTITIONER
Payer: COMMERCIAL

## 2023-10-21 VITALS
BODY MASS INDEX: 36.51 KG/M2 | WEIGHT: 193.4 LBS | HEART RATE: 68 BPM | HEIGHT: 61 IN | RESPIRATION RATE: 18 BRPM | OXYGEN SATURATION: 97 % | SYSTOLIC BLOOD PRESSURE: 112 MMHG | DIASTOLIC BLOOD PRESSURE: 80 MMHG | TEMPERATURE: 97 F

## 2023-10-21 DIAGNOSIS — R30.0 DYSURIA: ICD-10-CM

## 2023-10-21 DIAGNOSIS — N30.00 ACUTE CYSTITIS WITHOUT HEMATURIA: ICD-10-CM

## 2023-10-21 LAB
APPEARANCE UR: NORMAL
BILIRUB UR STRIP-MCNC: NORMAL MG/DL
COLOR UR AUTO: YELLOW
GLUCOSE UR STRIP.AUTO-MCNC: NORMAL MG/DL
KETONES UR STRIP.AUTO-MCNC: NORMAL MG/DL
LEUKOCYTE ESTERASE UR QL STRIP.AUTO: NORMAL
NITRITE UR QL STRIP.AUTO: NORMAL
PH UR STRIP.AUTO: 6 [PH] (ref 5–8)
PROT UR QL STRIP: NORMAL MG/DL
RBC UR QL AUTO: NORMAL
SP GR UR STRIP.AUTO: 1.01
UROBILINOGEN UR STRIP-MCNC: NORMAL MG/DL

## 2023-10-21 PROCEDURE — 3079F DIAST BP 80-89 MM HG: CPT | Performed by: NURSE PRACTITIONER

## 2023-10-21 PROCEDURE — 87086 URINE CULTURE/COLONY COUNT: CPT

## 2023-10-21 PROCEDURE — 3074F SYST BP LT 130 MM HG: CPT | Performed by: NURSE PRACTITIONER

## 2023-10-21 PROCEDURE — 81002 URINALYSIS NONAUTO W/O SCOPE: CPT | Performed by: NURSE PRACTITIONER

## 2023-10-21 PROCEDURE — 99213 OFFICE O/P EST LOW 20 MIN: CPT | Performed by: NURSE PRACTITIONER

## 2023-10-21 RX ORDER — CEFDINIR 300 MG/1
300 CAPSULE ORAL EVERY 12 HOURS
Qty: 10 CAPSULE | Refills: 0 | Status: SHIPPED | OUTPATIENT
Start: 2023-10-21 | End: 2023-10-26

## 2023-10-21 ASSESSMENT — ENCOUNTER SYMPTOMS
VOMITING: 0
DIZZINESS: 0
FLANK PAIN: 0
DIARRHEA: 0
CONSTIPATION: 0
HEADACHES: 0
BACK PAIN: 0
WEAKNESS: 0
FEVER: 0
CHILLS: 0
MYALGIAS: 0
NAUSEA: 0
ABDOMINAL PAIN: 0

## 2023-10-21 ASSESSMENT — FIBROSIS 4 INDEX: FIB4 SCORE: 0.84

## 2023-10-21 NOTE — PROGRESS NOTES
Subjective     Ashley Johansen is a 57 y.o. female who presents with Painful Urination (Towards the end of urination. ) and Urinary Frequency (X 1 week )            Urinary Frequency  Pertinent negatives include no abdominal pain, chills, fever, headaches, myalgias, nausea, rash, vomiting or weakness.    has been experiencing dysuria and frequency to urinate x1 week.  Denies fever, nausea, vomiting, diarrhea.  Denies kidney/flank pain but does have some right-sided lower pelvic pain.  States urine is cloudy but denies hematuria.  Denies recurrent history of UTIs.  States that she does drink plenty of water throughout the day.   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    PMH:  has a past medical history of Allergy, Anesthesia, Apnea, sleep, Arthritis, Daytime sleepiness, Gasping for breath, Hemorrhoids, Indigestion, Infectious disease (5/2014), Insomnia, Migraines, Morning headache, PAF (paroxysmal [facility] fibrillation) ([facility]) (6/24/2020), and Pleurisy.  MEDS:   Current Outpatient Medications:     flecainide (TAMBOCOR) 50 MG tablet, TAKE 1 TABLET BY MOUTH TWICE DAILY, Disp: 180 Tablet, Rfl: 1    metoprolol SR (TOPROL XL) 25 MG TABLET SR 24 HR, Take 1 Tablet by mouth every day., Disp: 90 Tablet, Rfl: 3    celecoxib (CELEBREX) 200 MG Cap, , Disp: , Rfl:     sumatriptan (IMITREX) 100 MG tablet, Take 1 Tablet by mouth one time as needed for Migraine., Disp: 10 Tablet, Rfl: 3    aspirin EC (ECOTRIN) 81 MG Tablet Delayed Response, Take 1 tablet by mouth every day., Disp: 30 tablet, Rfl:     albuterol 108 (90 Base) MCG/ACT Aero Soln inhalation aerosol, Inhale 2 Puffs by mouth every four hours as needed (wheezing)., Disp: 1 Inhaler, Rfl: 0  ALLERGIES:   Allergies   Allergen Reactions    Codeine Vomiting, Swelling, Unspecified and Shortness of Breath     swelling    Kiwi Extract Hives    Other Environmental Hives     Kiwi fruit     SURGHX:   Past Surgical History:  "  Procedure Laterality Date    HYSTEROSCOPY NOVASURE-2  6/17/2014    Performed by Mt Thomason M.D. at SURGERY SAME DAY [facility]S    TUBAL COAGULATION LAPAROSCOPIC BILATERAL  1998 1998    APPENDECTOMY      BOWEL RESECTION      CYSTECTOMY      EXPLORATORY LAPAROTOMY      for surgical adhesions, polyps    HEMORRHOIDECTOMY      PRIMARY C SECTION       SOCHX:  reports that she quit smoking about 27 years ago. Her smoking use included cigarettes. She started smoking about 33 years ago. She has a 3.0 pack-year smoking history. She has never used smokeless tobacco. She reports current alcohol use. She reports that she does not use drugs.  FH: Family history was reviewed, no pertinent findings to report    Review of Systems   Constitutional:  Negative for chills, fever and malaise/fatigue.   Gastrointestinal:  Negative for abdominal pain, constipation, diarrhea, nausea and vomiting.   Genitourinary:  Positive for dysuria, frequency and urgency. Negative for flank pain and hematuria.   Musculoskeletal:  Negative for back pain and myalgias.   Skin:  Negative for itching and rash.   Neurological:  Negative for dizziness, weakness and headaches.   All other systems reviewed and are negative.             Objective     /80   Pulse 68   Temp 36.1 °C (97 °F) (Temporal)   Resp 18   Ht 1.549 m (5' 1\")   Wt 87.7 kg (193 lb 6.4 oz)   SpO2 97%   BMI 36.54 kg/m²      Physical Exam  Vitals reviewed.   Constitutional:       General: She is awake. She is not in acute distress.     Appearance: Normal appearance. She is not ill-appearing, toxic-appearing or diaphoretic.   Cardiovascular:      Rate and Rhythm: Normal rate.   Pulmonary:      Effort: Pulmonary effort is normal.   Abdominal:      General: Bowel sounds are normal. There is no distension.      Palpations: Abdomen is soft.      Tenderness: There is abdominal tenderness in the suprapubic area. There is no right CVA tenderness, left CVA tenderness, guarding or " rebound.       Musculoskeletal:         General: Normal range of motion.   Skin:     General: Skin is warm and dry.   Neurological:      Mental Status: She is alert and oriented to person, place, and time.   Psychiatric:         Attention and Perception: Attention normal.         Mood and Affect: Mood normal.         Speech: Speech normal.         Behavior: Behavior normal. Behavior is cooperative.                             Assessment & Plan        1. Dysuria    - POCT Urinalysis    2. Acute cystitis without hematuria    - Urine Culture; Future  - cefdinir (OMNICEF) 300 MG Cap; Take 1 Capsule by mouth every 12 hours for 5 days.  Dispense: 10 Capsule; Refill: 0  - URINE CULTURE(NEW); Future    -Increase water intake  -Urinate more frequently and empty bladder completely  -Practice good toileting hygiene after bowel movements and sexual intercourse, refrain from sexual intercourse until infection cleared  -Monitor for back/flank pain, difficulty urinating, blood in urine- need re-evaluation    MyChart release of urine culture results with any changes in meds, patient notified

## 2023-10-23 DIAGNOSIS — N30.00 ACUTE CYSTITIS WITHOUT HEMATURIA: ICD-10-CM

## 2023-10-25 LAB
BACTERIA UR CULT: NORMAL
SIGNIFICANT IND 70042: NORMAL
SITE SITE: NORMAL
SOURCE SOURCE: NORMAL

## 2023-11-12 ENCOUNTER — OFFICE VISIT (OUTPATIENT)
Dept: URGENT CARE | Facility: PHYSICIAN GROUP | Age: 58
End: 2023-11-12
Payer: COMMERCIAL

## 2023-11-12 ENCOUNTER — HOSPITAL ENCOUNTER (OUTPATIENT)
Facility: MEDICAL CENTER | Age: 58
End: 2023-11-12
Attending: FAMILY MEDICINE
Payer: COMMERCIAL

## 2023-11-12 VITALS
HEART RATE: 80 BPM | DIASTOLIC BLOOD PRESSURE: 82 MMHG | HEIGHT: 61 IN | RESPIRATION RATE: 16 BRPM | TEMPERATURE: 97.3 F | SYSTOLIC BLOOD PRESSURE: 124 MMHG | OXYGEN SATURATION: 94 % | WEIGHT: 193 LBS | BODY MASS INDEX: 36.44 KG/M2

## 2023-11-12 DIAGNOSIS — R30.0 DYSURIA: ICD-10-CM

## 2023-11-12 DIAGNOSIS — I48.0 PAF (PAROXYSMAL ATRIAL FIBRILLATION) (HCC): ICD-10-CM

## 2023-11-12 LAB
APPEARANCE UR: NORMAL
BILIRUB UR STRIP-MCNC: NORMAL MG/DL
COLOR UR AUTO: YELLOW
GLUCOSE UR STRIP.AUTO-MCNC: NORMAL MG/DL
KETONES UR STRIP.AUTO-MCNC: NORMAL MG/DL
LEUKOCYTE ESTERASE UR QL STRIP.AUTO: NORMAL
NITRITE UR QL STRIP.AUTO: NORMAL
PH UR STRIP.AUTO: 5.5 [PH] (ref 5–8)
PROT UR QL STRIP: NORMAL MG/DL
RBC UR QL AUTO: NORMAL
SP GR UR STRIP.AUTO: 1
UROBILINOGEN UR STRIP-MCNC: 0.2 MG/DL

## 2023-11-12 PROCEDURE — 99213 OFFICE O/P EST LOW 20 MIN: CPT | Performed by: FAMILY MEDICINE

## 2023-11-12 PROCEDURE — 87086 URINE CULTURE/COLONY COUNT: CPT

## 2023-11-12 PROCEDURE — 81002 URINALYSIS NONAUTO W/O SCOPE: CPT | Performed by: FAMILY MEDICINE

## 2023-11-12 PROCEDURE — 1125F AMNT PAIN NOTED PAIN PRSNT: CPT | Performed by: FAMILY MEDICINE

## 2023-11-12 PROCEDURE — 3074F SYST BP LT 130 MM HG: CPT | Performed by: FAMILY MEDICINE

## 2023-11-12 PROCEDURE — 3079F DIAST BP 80-89 MM HG: CPT | Performed by: FAMILY MEDICINE

## 2023-11-12 RX ORDER — PHENAZOPYRIDINE HYDROCHLORIDE 200 MG/1
200 TABLET, FILM COATED ORAL 3 TIMES DAILY PRN
Qty: 6 TABLET | Refills: 0 | Status: SHIPPED | OUTPATIENT
Start: 2023-11-12

## 2023-11-12 RX ORDER — NITROFURANTOIN 25; 75 MG/1; MG/1
100 CAPSULE ORAL 2 TIMES DAILY
Qty: 10 CAPSULE | Refills: 0 | Status: SHIPPED | OUTPATIENT
Start: 2023-11-12 | End: 2023-11-17

## 2023-11-12 ASSESSMENT — FIBROSIS 4 INDEX: FIB4 SCORE: 0.84

## 2023-11-12 ASSESSMENT — PAIN SCALES - GENERAL: PAINLEVEL: 2=MINIMAL-SLIGHT

## 2023-11-12 NOTE — PROGRESS NOTES
Subjective     Ashley Johansen is a 57 y.o. female who presents with UTI (Passing blood and grey color. When wiping there is blood. Pain in the vaginal area. Urgency to go )      - This is a very pleasant 57 y.o. who has come to the walk-in clinic today for 1 day w/ dysuria/freq and blood n urine. No nvfc. Feels like uti. Had a colonoscopy few days ago and feels like she is getting over this well    Hx PAF, on BB and antiarrythmic       ALLERGIES:  Codeine, Kiwi extract, and Other environmental     PMH:  Past Medical History:   Diagnosis Date    Allergy     Anesthesia     clautraphobic unable to tolerated mask    Apnea, sleep     Arthritis     hands and feet    Daytime sleepiness     sometimes    Gasping for breath     Hemorrhoids     Indigestion     Infectious disease 5/2014    step throat    Insomnia     Migraines     Morning headache     PAF (paroxysmal atrial fibrillation) (HCC) 6/24/2020    Pleurisy         PSH:  Past Surgical History:   Procedure Laterality Date    HYSTEROSCOPY NOVASURE-2  6/17/2014    Performed by Mt Thomason M.D. at SURGERY SAME DAY [facility] ORS    TUBAL COAGULATION LAPAROSCOPIC BILATERAL  1998 1998    APPENDECTOMY      BOWEL RESECTION      CYSTECTOMY      EXPLORATORY LAPAROTOMY      for surgical adhesions, polyps    HEMORRHOIDECTOMY      PRIMARY C SECTION         MEDS:    Current Outpatient Medications:     nitrofurantoin (MACROBID) 100 MG Cap, Take 1 Capsule by mouth 2 times a day for 5 days., Disp: 10 Capsule, Rfl: 0    phenazopyridine (PYRIDIUM) 200 MG Tab, Take 1 Tablet by mouth 3 times a day as needed for Mild Pain., Disp: 6 Tablet, Rfl: 0    flecainide (TAMBOCOR) 50 MG tablet, TAKE 1 TABLET BY MOUTH TWICE DAILY, Disp: 180 Tablet, Rfl: 1    metoprolol SR (TOPROL XL) 25 MG TABLET SR 24 HR, Take 1 Tablet by mouth every day., Disp: 90 Tablet, Rfl: 3    celecoxib (CELEBREX) 200 MG Cap, , Disp: , Rfl:     sumatriptan (IMITREX) 100 MG tablet, Take 1 Tablet by mouth one time as  "needed for Migraine., Disp: 10 Tablet, Rfl: 3    aspirin EC (ECOTRIN) 81 MG Tablet Delayed Response, Take 1 tablet by mouth every day., Disp: 30 tablet, Rfl:     albuterol 108 (90 Base) MCG/ACT Aero Soln inhalation aerosol, Inhale 2 Puffs by mouth every four hours as needed (wheezing)., Disp: 1 Inhaler, Rfl: 0    ** I have documented what I find to be significant in regards to past medical, social, family and surgical history  in my HPI or under PMH/PSH/FH review section, otherwise it is noncontributory **         HPI    Review of Systems   Genitourinary:  Positive for dysuria.   All other systems reviewed and are negative.             Objective     /82   Pulse 80   Temp 36.3 °C (97.3 °F) (Temporal)   Resp 16   Ht 1.549 m (5' 1\")   Wt 87.5 kg (193 lb)   SpO2 94%   BMI 36.47 kg/m²      Physical Exam  Vitals and nursing note reviewed.   Constitutional:       General: She is not in acute distress.     Appearance: Normal appearance. She is well-developed.   HENT:      Head: Normocephalic.   Cardiovascular:      Heart sounds: Normal heart sounds. No murmur heard.  Pulmonary:      Effort: Pulmonary effort is normal. No respiratory distress.   Abdominal:      Palpations: Abdomen is soft.      Tenderness: There is no abdominal tenderness.   Neurological:      Mental Status: She is alert.      Motor: No abnormal muscle tone.   Psychiatric:         Mood and Affect: Mood normal.         Behavior: Behavior normal.                             Assessment & Plan     1. Dysuria  POCT Urinalysis    URINE CULTURE(NEW)    nitrofurantoin (MACROBID) 100 MG Cap    phenazopyridine (PYRIDIUM) 200 MG Tab      2. PAF (paroxysmal atrial fibrillation) ([facility])            - Dx, plan & d/c instructions discussed   - Rest, stay hydrated      Follow up with your regular primary care providers office in a week for a recheck on today's visit. ER if not improving in 2-3 days or if feeling/getting worse.     Any realistic side effects of " medications that may have been given today reviewed.     Patient left in stable condition     POCT results reviewed/discussed        Pertinent prior lab work and/or imaging studies in Epic have been reviewed by me today on day of this visit and taken into account for my treatment and plan today    Pertinent PMH/PSH and/or chronic conditions and medications if any were reviewed today and taken into account for my treatment and plan today    Pertinent prior office visit notes in Epic have been reviewed by me today on day of this visit.    Please note that this dictation may have been created using voice recognition software, if so I have made every reasonable attempt to correct obvious errors, but I expect that there are errors of grammar and possibly content that I did not discover before finalizing the note.

## 2023-11-13 DIAGNOSIS — R30.0 DYSURIA: ICD-10-CM

## 2023-11-15 LAB
BACTERIA UR CULT: NORMAL
SIGNIFICANT IND 70042: NORMAL
SITE SITE: NORMAL
SOURCE SOURCE: NORMAL

## 2023-12-06 ENCOUNTER — APPOINTMENT (OUTPATIENT)
Dept: RADIOLOGY | Facility: IMAGING CENTER | Age: 58
End: 2023-12-06
Attending: PHYSICIAN ASSISTANT
Payer: COMMERCIAL

## 2023-12-06 ENCOUNTER — OFFICE VISIT (OUTPATIENT)
Dept: URGENT CARE | Facility: PHYSICIAN GROUP | Age: 58
End: 2023-12-06
Payer: COMMERCIAL

## 2023-12-06 VITALS
HEIGHT: 61 IN | HEART RATE: 78 BPM | RESPIRATION RATE: 18 BRPM | OXYGEN SATURATION: 98 % | BODY MASS INDEX: 35.87 KG/M2 | TEMPERATURE: 98.6 F | WEIGHT: 190 LBS | DIASTOLIC BLOOD PRESSURE: 96 MMHG | SYSTOLIC BLOOD PRESSURE: 146 MMHG

## 2023-12-06 DIAGNOSIS — R06.02 SHORTNESS OF BREATH: ICD-10-CM

## 2023-12-06 DIAGNOSIS — R05.1 ACUTE COUGH: ICD-10-CM

## 2023-12-06 DIAGNOSIS — R07.9 CHEST PAIN, UNSPECIFIED TYPE: ICD-10-CM

## 2023-12-06 PROCEDURE — 71046 X-RAY EXAM CHEST 2 VIEWS: CPT | Mod: TC,FY | Performed by: RADIOLOGY

## 2023-12-06 PROCEDURE — 3080F DIAST BP >= 90 MM HG: CPT | Performed by: PHYSICIAN ASSISTANT

## 2023-12-06 PROCEDURE — 99214 OFFICE O/P EST MOD 30 MIN: CPT | Performed by: PHYSICIAN ASSISTANT

## 2023-12-06 PROCEDURE — 3077F SYST BP >= 140 MM HG: CPT | Performed by: PHYSICIAN ASSISTANT

## 2023-12-06 ASSESSMENT — ENCOUNTER SYMPTOMS
HEADACHES: 0
SORE THROAT: 0
NAUSEA: 0
COUGH: 1
EYE REDNESS: 0
FEVER: 1
VOMITING: 0
SHORTNESS OF BREATH: 1
EYE DISCHARGE: 0
SPUTUM PRODUCTION: 0
DIARRHEA: 0
MYALGIAS: 0

## 2023-12-06 ASSESSMENT — FIBROSIS 4 INDEX: FIB4 SCORE: 0.84

## 2023-12-06 NOTE — PROGRESS NOTES
Subjective     Ashley Johansen is a 57 y.o. female who presents with Chest Pain and Shortness of Breath            This is a new problem.  The patient presents to clinic complaining of chest pain and shortness of breath x today.  The patient states she has been sick over the past week with associated cough and congestion.  The patient states she has been experiencing an intermittent fever.  The patient states her last measured fever was last night with a temperature of 102.0.  The patient states she has been experiencing worsening shortness of breath with her current URI-like symptoms.  The patient states she developed chest pain earlier today while at work.  The patient describes her chest pain as a heaviness and pressure, and states her chest pain is 7/10 in severity.  The patient reports no radiation of the pain.  The patient states her chest pain waxes and wanes in severity, but reports an unknown duration of chest pain.  The patient reports no associated ear pain, sore throat, headaches, or body aches.  She also reports no nausea, vomiting, or diarrhea.  The patient has taken OTC Tylenol for her current symptoms.  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  The patient's past medical history is significant for A-fib.    Chest Pain   This is a new problem. The current episode started today. The problem occurs constantly. The problem has been waxing and waning. The pain is present in the substernal region. The pain is at a severity of 7/10. The quality of the pain is described as pressure and heavy. The pain does not radiate. Associated symptoms include a cough, a fever and shortness of breath. Pertinent negatives include no headaches, nausea, sputum production or vomiting. The cough is Non-productive. Treatments tried: OTC Tylenol.     PMH:  has a past medical history of Allergy, Anesthesia, Apnea, sleep, Arthritis, Daytime sleepiness, Gasping for breath, Hemorrhoids,  Indigestion, Infectious disease (5/2014), Insomnia, Migraines, Morning headache, PAF (paroxysmal atrial fibrillation) (HCC) (6/24/2020), and Pleurisy.  MEDS:   Current Outpatient Medications:     phenazopyridine (PYRIDIUM) 200 MG Tab, Take 1 Tablet by mouth 3 times a day as needed for Mild Pain., Disp: 6 Tablet, Rfl: 0    flecainide (TAMBOCOR) 50 MG tablet, TAKE 1 TABLET BY MOUTH TWICE DAILY, Disp: 180 Tablet, Rfl: 1    metoprolol SR (TOPROL XL) 25 MG TABLET SR 24 HR, Take 1 Tablet by mouth every day., Disp: 90 Tablet, Rfl: 3    celecoxib (CELEBREX) 200 MG Cap, , Disp: , Rfl:     sumatriptan (IMITREX) 100 MG tablet, Take 1 Tablet by mouth one time as needed for Migraine., Disp: 10 Tablet, Rfl: 3    aspirin EC (ECOTRIN) 81 MG Tablet Delayed Response, Take 1 tablet by mouth every day., Disp: 30 tablet, Rfl:     albuterol 108 (90 Base) MCG/ACT Aero Soln inhalation aerosol, Inhale 2 Puffs by mouth every four hours as needed (wheezing)., Disp: 1 Inhaler, Rfl: 0  ALLERGIES:   Allergies   Allergen Reactions    Codeine Vomiting, Swelling, Unspecified and Shortness of Breath     swelling    Kiwi Extract Hives    Other Environmental Hives     Kiwi fruit     SURGHX:   Past Surgical History:   Procedure Laterality Date    HYSTEROSCOPY NOVASURE-2  6/17/2014    Performed by Mt Thomason M.D. at SURGERY SAME DAY [facility] ORS    TUBAL COAGULATION LAPAROSCOPIC BILATERAL  1998 1998    APPENDECTOMY      BOWEL RESECTION      CYSTECTOMY      EXPLORATORY LAPAROTOMY      for surgical adhesions, polyps    HEMORRHOIDECTOMY      PRIMARY C SECTION       SOCHX:  reports that she quit smoking about 27 years ago. Her smoking use included cigarettes. She started smoking about 33 years ago. She has a 3.0 pack-year smoking history. She has never used smokeless tobacco. She reports current alcohol use. She reports that she does not use drugs.  FH: Family history was reviewed, no pertinent findings to report        Review of Systems  "  Constitutional:  Positive for fever.   HENT:  Positive for congestion. Negative for ear pain and sore throat.    Eyes:  Negative for discharge and redness.   Respiratory:  Positive for cough and shortness of breath. Negative for sputum production.    Cardiovascular:  Positive for chest pain.   Gastrointestinal:  Negative for diarrhea, nausea and vomiting.   Musculoskeletal:  Negative for myalgias.   Neurological:  Negative for headaches.              Objective     BP (!) 146/96   Pulse 78   Temp 37 °C (98.6 °F) (Temporal)   Resp 18   Ht 1.549 m (5' 1\")   Wt 86.2 kg (190 lb)   SpO2 98%   BMI 35.90 kg/m²      Physical Exam  Constitutional:       General: She is not in acute distress.     Appearance: Normal appearance. She is ill-appearing.   HENT:      Head: Normocephalic and atraumatic.      Right Ear: External ear normal.      Left Ear: External ear normal.      Nose: Nose normal.      Mouth/Throat:      Mouth: Mucous membranes are moist.      Pharynx: Oropharynx is clear. No posterior oropharyngeal erythema.   Eyes:      Extraocular Movements: Extraocular movements intact.      Conjunctiva/sclera: Conjunctivae normal.   Cardiovascular:      Rate and Rhythm: Normal rate and regular rhythm.      Heart sounds: Normal heart sounds.   Pulmonary:      Effort: Pulmonary effort is normal. No respiratory distress.      Breath sounds: Decreased breath sounds and rales present. No wheezing.   Musculoskeletal:         General: Normal range of motion.      Cervical back: Normal range of motion and neck supple.   Skin:     General: Skin is warm and dry.   Neurological:      Mental Status: She is alert and oriented to person, place, and time.                  Progress:  CXR:  COMPARISON:  5/15/2023     FINDINGS:  The mediastinal and cardiac silhouette is unremarkable.     The pulmonary vascularity is within normal limits.     The lung parenchyma is clear.     There is no significant pleural effusion.     There is no " visible pneumothorax.     There is mild degenerative change in the spine and AC joints.     IMPRESSION:  1.  No evidence of acute cardiopulmonary process.    EKG:  EKG Interpretation   Interpreted by me   Rhythm: normal sinus   Rate: normal   Axis: normal   Ectopy: none   Conduction: normal   ST Segments: no acute change   T Waves: no acute change   Q Waves: none   Clinical Impression: no acute changes                 Assessment & Plan        1. Chest pain, unspecified type  - EKG    2. Shortness of breath  - DX-CHEST-2 VIEWS; Future    3. Acute cough    The patient's presenting symptoms and physical exam findings are consistent with chest pain and shortness of breath.  The patient has been experiencing symptoms of a viral URI with associated cough.  The patient developed subsequent chest pain with worsening shortness of breath as of today.  On physical exam, the patient had rales to the bilateral bases with decreased breath sounds.  The remainder the patient's physical exam today in clinic was normal.  The patient's heart was regular rate and rhythm without murmurs, gallops, or rubs.  The patient is ill-appearing and visibly short of breath.  The patient's vital signs are stable and within normal limits.  She is afebrile today in clinic.  An EKG and a chest x-ray were obtained to further evaluate the patient's current symptoms.  The patient's EKG today in clinic showed normal sinus rhythm with a heart rate of 71.  No acute ST segment changes were noted.  This is slightly changed from the patient's previous EKG from 9/11/2023 in terms of low voltage in the extremity leads.  The patient's chest x-ray showed no evidence of acute cardiopulmonary process.  The cause of the patient's acute chest pain and shortness of breath is unknown at this time.  Based on the patient's presenting symptoms and physical exam findings, I believe the patient would benefit from a higher level of care.  Therefore, I recommend the patient be  transferred to the [facility] ED for further evaluation management.  The patient agrees with this plan.  The patient stable for transfer via private vehicle at this time, and states her  will transport her to the [facility] ED.  Advised the patient of the associate risks of not seeking further care for her current symptoms, and she verbalized understanding.    Plan:  Transfer patient to the [facility] ED for further evaluation management.    I personally reviewed prior external notes and test results pertinent to today's visit.  I have independently reviewed and interpreted all diagnostics ordered during this urgent care visit.     Please note that this dictation was created using voice recognition software. I have made every reasonable attempt to correct obvious errors, but I expect that there may be errors of grammar and possibly content that I did not discover before finalizing the note.     This note was electronically signed by Daysi Carr PA-C

## 2024-04-10 DIAGNOSIS — I48.0 PAF (PAROXYSMAL ATRIAL FIBRILLATION) (HCC): ICD-10-CM

## 2024-04-11 RX ORDER — FLECAINIDE ACETATE 50 MG/1
50 TABLET ORAL 2 TIMES DAILY
Qty: 180 TABLET | Refills: 1 | Status: SHIPPED | OUTPATIENT
Start: 2024-04-11

## 2024-06-18 ENCOUNTER — APPOINTMENT (RX ONLY)
Dept: URBAN - METROPOLITAN AREA CLINIC 4 | Facility: CLINIC | Age: 59
Setting detail: DERMATOLOGY
End: 2024-06-18

## 2024-06-18 DIAGNOSIS — L81.4 OTHER MELANIN HYPERPIGMENTATION: ICD-10-CM

## 2024-06-18 DIAGNOSIS — D22 MELANOCYTIC NEVI: ICD-10-CM

## 2024-06-18 DIAGNOSIS — L82.1 OTHER SEBORRHEIC KERATOSIS: ICD-10-CM

## 2024-06-18 DIAGNOSIS — B07.8 OTHER VIRAL WARTS: ICD-10-CM

## 2024-06-18 DIAGNOSIS — D18.0 HEMANGIOMA: ICD-10-CM

## 2024-06-18 DIAGNOSIS — L57.0 ACTINIC KERATOSIS: ICD-10-CM

## 2024-06-18 DIAGNOSIS — D485 NEOPLASM OF UNCERTAIN BEHAVIOR OF SKIN: ICD-10-CM

## 2024-06-18 DIAGNOSIS — Z71.89 OTHER SPECIFIED COUNSELING: ICD-10-CM

## 2024-06-18 PROBLEM — D18.01 HEMANGIOMA OF SKIN AND SUBCUTANEOUS TISSUE: Status: ACTIVE | Noted: 2024-06-18

## 2024-06-18 PROBLEM — D22.5 MELANOCYTIC NEVI OF TRUNK: Status: ACTIVE | Noted: 2024-06-18

## 2024-06-18 PROBLEM — D48.5 NEOPLASM OF UNCERTAIN BEHAVIOR OF SKIN: Status: ACTIVE | Noted: 2024-06-18

## 2024-06-18 PROCEDURE — ? SUNSCREEN RECOMMENDATIONS

## 2024-06-18 PROCEDURE — 11103 TANGNTL BX SKIN EA SEP/ADDL: CPT | Mod: 59

## 2024-06-18 PROCEDURE — ? LIQUID NITROGEN

## 2024-06-18 PROCEDURE — ? COUNSELING

## 2024-06-18 PROCEDURE — ? PRESCRIPTION

## 2024-06-18 PROCEDURE — 17110 DESTRUCTION B9 LES UP TO 14: CPT

## 2024-06-18 PROCEDURE — ? BIOPSY BY SHAVE METHOD

## 2024-06-18 PROCEDURE — ? MEDICATION COUNSELING

## 2024-06-18 PROCEDURE — 99203 OFFICE O/P NEW LOW 30 MIN: CPT | Mod: 25

## 2024-06-18 PROCEDURE — 11102 TANGNTL BX SKIN SINGLE LES: CPT | Mod: 59

## 2024-06-18 ASSESSMENT — LOCATION DETAILED DESCRIPTION DERM
LOCATION DETAILED: RIGHT UPPER CUTANEOUS LIP
LOCATION DETAILED: RIGHT FOREHEAD
LOCATION DETAILED: RIGHT LATERAL FOREHEAD
LOCATION DETAILED: RIGHT MEDIAL FOREHEAD
LOCATION DETAILED: PERIUNGUAL SKIN RIGHT THUMB
LOCATION DETAILED: LEFT FOREHEAD
LOCATION DETAILED: RIGHT SUPERIOR FOREHEAD
LOCATION DETAILED: LEFT SUPERIOR LATERAL BUCCAL CHEEK
LOCATION DETAILED: LEFT ANTERIOR MEDIAL DISTAL THIGH
LOCATION DETAILED: LEFT SUPERIOR FOREHEAD
LOCATION DETAILED: RIGHT THUMBNAIL
LOCATION DETAILED: LEFT MEDIAL MALAR CHEEK
LOCATION DETAILED: RIGHT UPPER CUTANEOUS LIP
LOCATION DETAILED: RIGHT POSTERIOR SHOULDER
LOCATION DETAILED: LEFT POSTERIOR SHOULDER
LOCATION DETAILED: INFERIOR THORACIC SPINE

## 2024-06-18 ASSESSMENT — LOCATION ZONE DERM
LOCATION ZONE: TRUNK
LOCATION ZONE: LEG
LOCATION ZONE: LIP
LOCATION ZONE: FACE
LOCATION ZONE: FINGER
LOCATION ZONE: FINGERNAIL
LOCATION ZONE: ARM
LOCATION ZONE: FACE
LOCATION ZONE: LIP

## 2024-06-18 ASSESSMENT — LOCATION SIMPLE DESCRIPTION DERM
LOCATION SIMPLE: RIGHT THUMB
LOCATION SIMPLE: RIGHT LIP
LOCATION SIMPLE: LEFT FOREHEAD
LOCATION SIMPLE: RIGHT THUMBNAIL
LOCATION SIMPLE: RIGHT FOREHEAD
LOCATION SIMPLE: LEFT THIGH
LOCATION SIMPLE: LEFT CHEEK
LOCATION SIMPLE: RIGHT LIP
LOCATION SIMPLE: LEFT SHOULDER
LOCATION SIMPLE: RIGHT FOREHEAD
LOCATION SIMPLE: LEFT FOREHEAD
LOCATION SIMPLE: UPPER BACK
LOCATION SIMPLE: RIGHT SHOULDER

## 2024-06-18 NOTE — PROCEDURE: MEDICATION COUNSELING
Enbrel Pregnancy And Lactation Text: This medication is Pregnancy Category B and is considered safe during pregnancy. It is unknown if this medication is excreted in breast milk.
Glycopyrrolate Counseling:  I discussed with the patient the risks of glycopyrrolate including but not limited to skin rash, drowsiness, dry mouth, difficulty urinating, and blurred vision.
Nsaids Pregnancy And Lactation Text: These medications are considered safe up to 30 weeks gestation. It is excreted in breast milk.
Opzelura Counseling:  I discussed with the patient the risks of Opzelura including but not limited to nasopharngitis, bronchitis, ear infection, eosinophila, hives, diarrhea, folliculitis, tonsillitis, and rhinorrhea.  Taken orally, this medication has been linked to serious infections; higher rate of mortality; malignancy and lymphoproliferative disorders; major adverse cardiovascular events; thrombosis; thrombocytopenia, anemia, and neutropenia; and lipid elevations.
Propranolol Counseling:  I discussed with the patient the risks of propranolol including but not limited to low heart rate, low blood pressure, low blood sugar, restlessness and increased cold sensitivity. They should call the office if they experience any of these side effects.
Tazorac Pregnancy And Lactation Text: This medication is not safe during pregnancy. It is unknown if this medication is excreted in breast milk.
Valtrex Pregnancy And Lactation Text: this medication is Pregnancy Category B and is considered safe during pregnancy. This medication is not directly found in breast milk but it's metabolite acyclovir is present.
Clofazimine Pregnancy And Lactation Text: This medication is Pregnancy Category C and isn't considered safe during pregnancy. It is excreted in breast milk.
Hydroquinone Pregnancy And Lactation Text: This medication has not been assigned a Pregnancy Risk Category but animal studies failed to show danger with the topical medication. It is unknown if the medication is excreted in breast milk.
Zoryve Pregnancy And Lactation Text: It is unknown if this medication can cause problems during pregnancy and breastfeeding.
Bexarotene Counseling:  I discussed with the patient the risks of bexarotene including but not limited to hair loss, dry lips/skin/eyes, liver abnormalities, hyperlipidemia, pancreatitis, depression/suicidal ideation, photosensitivity, drug rash/allergic reactions, hypothyroidism, anemia, leukopenia, infection, cataracts, and teratogenicity.  Patient understands that they will need regular blood tests to check lipid profile, liver function tests, white blood cell count, thyroid function tests and pregnancy test if applicable.
Topical Sulfur Applications Counseling: Topical Sulfur Counseling: Patient counseled that this medication may cause skin irritation or allergic reactions.  In the event of skin irritation, the patient was advised to reduce the amount of the drug applied or use it less frequently.   The patient verbalized understanding of the proper use and possible adverse effects of topical sulfur application.  All of the patient's questions and concerns were addressed.
Azithromycin Pregnancy And Lactation Text: This medication is considered safe during pregnancy and is also secreted in breast milk.
Itraconazole Pregnancy And Lactation Text: This medication is Pregnancy Category C and it isn't know if it is safe during pregnancy. It is also excreted in breast milk.
Calcipotriene Pregnancy And Lactation Text: The use of this medication during pregnancy or lactation is not recommended as there is insufficient data.
Rhofade Pregnancy And Lactation Text: This medication has not been assigned a Pregnancy Risk Category. It is unknown if the medication is excreted in breast milk.
Erythromycin Counseling:  I discussed with the patient the risks of erythromycin including but not limited to GI upset, allergic reaction, drug rash, diarrhea, increase in liver enzymes, and yeast infections.
Azathioprine Counseling:  I discussed with the patient the risks of azathioprine including but not limited to myelosuppression, immunosuppression, hepatotoxicity, lymphoma, and infections.  The patient understands that monitoring is required including baseline LFTs, Creatinine, possible TPMP genotyping and weekly CBCs for the first month and then every 2 weeks thereafter.  The patient verbalized understanding of the proper use and possible adverse effects of azathioprine.  All of the patient's questions and concerns were addressed.
Methotrexate Pregnancy And Lactation Text: This medication is Pregnancy Category X and is known to cause fetal harm. This medication is excreted in breast milk.
Ketoconazole Counseling:   Patient counseled regarding improving absorption with orange juice.  Adverse effects include but are not limited to breast enlargement, headache, diarrhea, nausea, upset stomach, liver function test abnormalities, taste disturbance, and stomach pain.  There is a rare possibility of liver failure that can occur when taking ketoconazole. The patient understands that monitoring of LFTs may be required, especially at baseline. The patient verbalized understanding of the proper use and possible adverse effects of ketoconazole.  All of the patient's questions and concerns were addressed.
Olumiant Pregnancy And Lactation Text: Based on animal studies, Olumiant may cause embryo-fetal harm when administered to pregnant women.  The medication should not be used in pregnancy.  Breastfeeding is not recommended during treatment.
Bimzelx Counseling:  I discussed with the patient the risks of Bimzelx including but not limited to depression, immunosuppression, allergic reactions and infections.  The patient understands that monitoring is required including a PPD at baseline and must alert us or the primary physician if symptoms of infection or other concerning signs are noted.
Calcipotriene Counseling:  I discussed with the patient the risks of calcipotriene including but not limited to erythema, scaling, itching, and irritation.
Dutasteride Female Counseling: Dutasteride Counseling:  I discussed with the patient the risks of use of dutasteride including but not limited to decreased libido and sexual dysfunction. Explained the teratogenic nature of the medication and stressed the importance of not getting pregnant during treatment. All of the patient's questions and concerns were addressed.
Hydroxyzine Counseling: Patient advised that the medication is sedating and not to drive a car after taking this medication.  Patient informed of potential adverse effects including but not limited to dry mouth, urinary retention, and blurry vision.  The patient verbalized understanding of the proper use and possible adverse effects of hydroxyzine.  All of the patient's questions and concerns were addressed.
Sarecycline Pregnancy And Lactation Text: This medication is Pregnancy Category D and not consider safe during pregnancy. It is also excreted in breast milk.
Rituxan Counseling:  I discussed with the patient the risks of Rituxan infusions. Side effects can include infusion reactions, severe drug rashes including mucocutaneous reactions, reactivation of latent hepatitis and other infections and rarely progressive multifocal leukoencephalopathy.  All of the patient's questions and concerns were addressed.
Bimzelx Pregnancy And Lactation Text: This medication crosses the placenta and the safety is uncertain during pregnancy. It is unknown if this medication is present in breast milk.
Glycopyrrolate Pregnancy And Lactation Text: This medication is Pregnancy Category B and is considered safe during pregnancy. It is unknown if it is excreted breast milk.
Hydroxyzine Pregnancy And Lactation Text: This medication is not safe during pregnancy and should not be taken. It is also excreted in breast milk and breast feeding isn't recommended.
Rituxan Pregnancy And Lactation Text: This medication is Pregnancy Category C and it isn't know if it is safe during pregnancy. It is unknown if this medication is excreted in breast milk but similar antibodies are known to be excreted.
Erythromycin Pregnancy And Lactation Text: This medication is Pregnancy Category B and is considered safe during pregnancy. It is also excreted in breast milk.
Azathioprine Pregnancy And Lactation Text: This medication is Pregnancy Category D and isn't considered safe during pregnancy. It is unknown if this medication is excreted in breast milk.
Detail Level: Simple
Prednisone Counseling:  I discussed with the patient the risks of prolonged use of prednisone including but not limited to weight gain, insomnia, osteoporosis, mood changes, diabetes, susceptibility to infection, glaucoma and high blood pressure.  In cases where prednisone use is prolonged, patients should be monitored with blood pressure checks, serum glucose levels and an eye exam.  Additionally, the patient may need to be placed on GI prophylaxis, PCP prophylaxis, and calcium and vitamin D supplementation and/or a bisphosphonate.  The patient verbalized understanding of the proper use and the possible adverse effects of prednisone.  All of the patient's questions and concerns were addressed.
Topical Sulfur Applications Pregnancy And Lactation Text: This medication is considered safe during pregnancy and breast feeding secondary to limited systemic absorption.
Bactrim Counseling:  I discussed with the patient the risks of sulfa antibiotics including but not limited to GI upset, allergic reaction, drug rash, diarrhea, dizziness, photosensitivity, and yeast infections.  Rarely, more serious reactions can occur including but not limited to aplastic anemia, agranulocytosis, methemoglobinemia, blood dyscrasias, liver or kidney failure, lung infiltrates or desquamative/blistering drug rashes.
Topical Clindamycin Counseling: Patient counseled that this medication may cause skin irritation or allergic reactions.  In the event of skin irritation, the patient was advised to reduce the amount of the drug applied or use it less frequently.   The patient verbalized understanding of the proper use and possible adverse effects of clindamycin.  All of the patient's questions and concerns were addressed.
Solaraze Counseling:  I discussed with the patient the risks of Solaraze including but not limited to erythema, scaling, itching, weeping, crusting, and pain.
Cantharidin Pregnancy And Lactation Text: This medication has not been proven safe during pregnancy. It is unknown if this medication is excreted in breast milk.
Imiquimod Counseling:  I discussed with the patient the risks of imiquimod including but not limited to erythema, scaling, itching, weeping, crusting, and pain.  Patient understands that the inflammatory response to imiquimod is variable from person to person and was educated regarded proper titration schedule.  If flu-like symptoms develop, patient knows to discontinue the medication and contact us.
Bexarotene Pregnancy And Lactation Text: This medication is Pregnancy Category X and should not be given to women who are pregnant or may become pregnant. This medication should not be used if you are breast feeding.
Opzelura Pregnancy And Lactation Text: There is insufficient data to evaluate drug-associated risk for major birth defects, miscarriage, or other adverse maternal or fetal outcomes.  There is a pregnancy registry that monitors pregnancy outcomes in pregnant persons exposed to the medication during pregnancy.  It is unknown if this medication is excreted in breast milk.  Do not breastfeed during treatment and for about 4 weeks after the last dose.
Colchicine Counseling:  Patient counseled regarding adverse effects including but not limited to stomach upset (nausea, vomiting, stomach pain, or diarrhea).  Patient instructed to limit alcohol consumption while taking this medication.  Colchicine may reduce blood counts especially with prolonged use.  The patient understands that monitoring of kidney function and blood counts may be required, especially at baseline. The patient verbalized understanding of the proper use and possible adverse effects of colchicine.  All of the patient's questions and concerns were addressed.
5-Fu Pregnancy And Lactation Text: This medication is Pregnancy Category X and contraindicated in pregnancy and in women who may become pregnant. It is unknown if this medication is excreted in breast milk.
Zyclara Counseling:  I discussed with the patient the risks of imiquimod including but not limited to erythema, scaling, itching, weeping, crusting, and pain.  Patient understands that the inflammatory response to imiquimod is variable from person to person and was educated regarded proper titration schedule.  If flu-like symptoms develop, patient knows to discontinue the medication and contact us.
Picato Counseling:  I discussed with the patient the risks of Picato including but not limited to erythema, scaling, itching, weeping, crusting, and pain.
Ketoconazole Pregnancy And Lactation Text: This medication is Pregnancy Category C and it isn't know if it is safe during pregnancy. It is also excreted in breast milk and breast feeding isn't recommended.
Aklief counseling:  Patient advised to apply a pea-sized amount only at bedtime and wait 30 minutes after washing their face before applying.  If too drying, patient may add a non-comedogenic moisturizer.  The most commonly reported side effects including irritation, redness, scaling, dryness, stinging, burning, itching, and increased risk of sunburn.  The patient verbalized understanding of the proper use and possible adverse effects of retinoids.  All of the patient's questions and concerns were addressed.
Dutasteride Pregnancy And Lactation Text: This medication is absolutely contraindicated in women, especially during pregnancy and breast feeding. Feminization of male fetuses is possible if taking while pregnant.
Rinvoq Counseling: I discussed with the patient the risks of Rinvoq therapy including but not limited to upper respiratory tract infections, shingles, cold sores, bronchitis, nausea, cough, fever, acne, and headache. Live vaccines should be avoided.  This medication has been linked to serious infections; higher rate of mortality; malignancy and lymphoproliferative disorders; major adverse cardiovascular events; thrombosis; thrombocytopenia, anemia, and neutropenia; lipid elevations; liver enzyme elevations; and gastrointestinal perforations.
Erivedge Counseling- I discussed with the patient the risks of Erivedge including but not limited to nausea, vomiting, diarrhea, constipation, weight loss, changes in the sense of taste, decreased appetite, muscle spasms, and hair loss.  The patient verbalized understanding of the proper use and possible adverse effects of Erivedge.  All of the patient's questions and concerns were addressed.
Cantharidin Counseling:  I discussed with the patient the risks of Cantharidin including but not limited to pain, redness, burning, itching, and blistering.
Taltz Counseling: I discussed with the patient the risks of ixekizumab including but not limited to immunosuppression, serious infections, worsening of inflammatory bowel disease and drug reactions.  The patient understands that monitoring is required including a PPD at baseline and must alert us or the primary physician if symptoms of infection or other concerning signs are noted.
Olanzapine Counseling- I discussed with the patient the common side effects of olanzapine including but are not limited to: lack of energy, dry mouth, increased appetite, sleepiness, tremor, constipation, dizziness, changes in behavior, or restlessness.  Explained that teenagers are more likely to experience headaches, abdominal pain, pain in the arms or legs, tiredness, and sleepiness.  Serious side effects include but are not limited: increased risk of death in elderly patients who are confused, have memory loss, or dementia-related psychosis; hyperglycemia; increased cholesterol and triglycerides; and weight gain.
Propranolol Pregnancy And Lactation Text: This medication is Pregnancy Category C and it isn't known if it is safe during pregnancy. It is excreted in breast milk.
Tetracycline Counseling: Patient counseled regarding possible photosensitivity and increased risk for sunburn.  Patient instructed to avoid sunlight, if possible.  When exposed to sunlight, patients should wear protective clothing, sunglasses, and sunscreen.  The patient was instructed to call the office immediately if the following severe adverse effects occur:  hearing changes, easy bruising/bleeding, severe headache, or vision changes.  The patient verbalized understanding of the proper use and possible adverse effects of tetracycline.  All of the patient's questions and concerns were addressed. Patient understands to avoid pregnancy while on therapy due to potential birth defects.
Humira Counseling:  I discussed with the patient the risks of adalimumab including but not limited to myelosuppression, immunosuppression, autoimmune hepatitis, demyelinating diseases, lymphoma, and serious infections.  The patient understands that monitoring is required including a PPD at baseline and must alert us or the primary physician if symptoms of infection or other concerning signs are noted.
Use Enhanced Medication Counseling?: No
Cellcept Counseling:  I discussed with the patient the risks of mycophenolate mofetil including but not limited to infection/immunosuppression, GI upset, hypokalemia, hypercholesterolemia, bone marrow suppression, lymphoproliferative disorders, malignancy, GI ulceration/bleed/perforation, colitis, interstitial lung disease, kidney failure, progressive multifocal leukoencephalopathy, and birth defects.  The patient understands that monitoring is required including a baseline creatinine and regular CBC testing. In addition, patient must alert us immediately if symptoms of infection or other concerning signs are noted.
Prednisone Pregnancy And Lactation Text: This medication is Pregnancy Category C and it isn't know if it is safe during pregnancy. This medication is excreted in breast milk.
Isotretinoin Counseling: Patient should get monthly blood tests, not donate blood, not drive at night if vision affected, not share medication, and not undergo elective surgery for 6 months after tx completed. Side effects reviewed, pt to contact office should one occur.
Bactrim Pregnancy And Lactation Text: This medication is Pregnancy Category D and is known to cause fetal risk.  It is also excreted in breast milk.
Metronidazole Counseling:  I discussed with the patient the risks of metronidazole including but not limited to seizures, nausea/vomiting, a metallic taste in the mouth, nausea/vomiting and severe allergy.
Siliq Counseling:  I discussed with the patient the risks of Siliq including but not limited to new or worsening depression, suicidal thoughts and behavior, immunosuppression, malignancy, posterior leukoencephalopathy syndrome, and serious infections.  The patient understands that monitoring is required including a PPD at baseline and must alert us or the primary physician if symptoms of infection or other concerning signs are noted. There is also a special program designed to monitor depression which is required with Siliq.
Cimzia Counseling:  I discussed with the patient the risks of Cimzia including but not limited to immunosuppression, allergic reactions and infections.  The patient understands that monitoring is required including a PPD at baseline and must alert us or the primary physician if symptoms of infection or other concerning signs are noted.
Imiquimod Pregnancy And Lactation Text: This medication is Pregnancy Category C. It is unknown if this medication is excreted in breast milk.
Wartpeel Counseling:  I discussed with the patient the risks of Wartpeel including but not limited to erythema, scaling, itching, weeping, crusting, and pain.
Drysol Counseling:  I discussed with the patient the risks of drysol/aluminum chloride including but not limited to skin rash, itching, irritation, burning.
Solaraze Pregnancy And Lactation Text: This medication is Pregnancy Category B and is considered safe. There is some data to suggest avoiding during the third trimester. It is unknown if this medication is excreted in breast milk.
Topical Clindamycin Pregnancy And Lactation Text: This medication is Pregnancy Category B and is considered safe during pregnancy. It is unknown if it is excreted in breast milk.
Drysol Pregnancy And Lactation Text: This medication is considered safe during pregnancy and breast feeding.
Klisyri Counseling:  I discussed with the patient the risks of Klisyri including but not limited to erythema, scaling, itching, weeping, crusting, and pain.
Soolantra Counseling: I discussed with the patients the risks of topial Soolantra. This is a medicine which decreases the number of mites and inflammation in the skin. You experience burning, stinging, eye irritation or allergic reactions.  Please call our office if you develop any problems from using this medication.
Terbinafine Counseling: Patient counseling regarding adverse effects of terbinafine including but not limited to headache, diarrhea, rash, upset stomach, liver function test abnormalities, itching, taste/smell disturbance, nausea, abdominal pain, and flatulence.  There is a rare possibility of liver failure that can occur when taking terbinafine.  The patient understands that a baseline LFT and kidney function test may be required. The patient verbalized understanding of the proper use and possible adverse effects of terbinafine.  All of the patient's questions and concerns were addressed.
Albendazole Counseling:  I discussed with the patient the risks of albendazole including but not limited to cytopenia, kidney damage, nausea/vomiting and severe allergy.  The patient understands that this medication is being used in an off-label manner.
Aklief Pregnancy And Lactation Text: It is unknown if this medication is safe to use during pregnancy.  It is unknown if this medication is excreted in breast milk.  Breastfeeding women should use the topical cream on the smallest area of the skin for the shortest time needed while breastfeeding.  Do not apply to nipple and areola.
Rinvoq Pregnancy And Lactation Text: Based on animal studies, Rinvoq may cause embryo-fetal harm when administered to pregnant women.  The medication should not be used in pregnancy.  Breastfeeding is not recommended during treatment and for 6 days after the last dose.
Finasteride Male Counseling: Finasteride Counseling:  I discussed with the patient the risks of use of finasteride including but not limited to decreased libido, decreased ejaculate volume, gynecomastia, and depression. Women should not handle medication.  All of the patient's questions and concerns were addressed.
5-Fu Counseling: 5-Fluorouracil Counseling:  I discussed with the patient the risks of 5-fluorouracil including but not limited to erythema, scaling, itching, weeping, crusting, and pain.
SSKI Counseling:  I discussed with the patient the risks of SSKI including but not limited to thyroid abnormalities, metallic taste, GI upset, fever, headache, acne, arthralgias, paraesthesias, lymphadenopathy, easy bleeding, arrhythmias, and allergic reaction.
Erivedge Pregnancy And Lactation Text: This medication is Pregnancy Category X and is absolutely contraindicated during pregnancy. It is unknown if it is excreted in breast milk.
Taltz Pregnancy And Lactation Text: The risk during pregnancy and breastfeeding is uncertain with this medication.
Hydroxychloroquine Counseling:  I discussed with the patient that a baseline ophthalmologic exam is needed at the start of therapy and every year thereafter while on therapy. A CBC may also be warranted for monitoring.  The side effects of this medication were discussed with the patient, including but not limited to agranulocytosis, aplastic anemia, seizures, rashes, retinopathy, and liver toxicity. Patient instructed to call the office should any adverse effect occur.  The patient verbalized understanding of the proper use and possible adverse effects of Plaquenil.  All the patient's questions and concerns were addressed.
Olanzapine Pregnancy And Lactation Text: This medication is pregnancy category C.   There are no adequate and well controlled trials with olanzapine in pregnant females.  Olanzapine should be used during pregnancy only if the potential benefit justifies the potential risk to the fetus.   In a study in lactating healthy women, olanzapine was excreted in breast milk.  It is recommended that women taking olanzapine should not breast feed.
Libtayo Counseling- I discussed with the patient the risks of Libtayo including but not limited to nausea, vomiting, diarrhea, and bone or muscle pain.  The patient verbalized understanding of the proper use and possible adverse effects of Libtayo.  All of the patient's questions and concerns were addressed.
Metronidazole Pregnancy And Lactation Text: This medication is Pregnancy Category B and considered safe during pregnancy.  It is also excreted in breast milk.
Tremfya Counseling: I discussed with the patient the risks of guselkumab including but not limited to immunosuppression, serious infections, and drug reactions.  The patient understands that monitoring is required including a PPD at baseline and must alert us or the primary physician if symptoms of infection or other concerning signs are noted.
Opioid Counseling: I discussed with the patient the potential side effects of opioids including but not limited to addiction, altered mental status, and depression. I stressed avoiding alcohol, benzodiazepines, muscle relaxants and sleep aids unless specifically okayed by a physician. The patient verbalized understanding of the proper use and possible adverse effects of opioids. All of the patient's questions and concerns were addressed. They were instructed to flush the remaining pills down the toilet if they did not need them for pain.
Cimzia Pregnancy And Lactation Text: This medication crosses the placenta but can be considered safe in certain situations. Cimzia may be excreted in breast milk.
Hyrimoz Counseling:  I discussed with the patient the risks of adalimumab including but not limited to myelosuppression, immunosuppression, autoimmune hepatitis, demyelinating diseases, lymphoma, and serious infections.  The patient understands that monitoring is required including a PPD at baseline and must alert us or the primary physician if symptoms of infection or other concerning signs are noted.
Finasteride Pregnancy And Lactation Text: This medication is absolutely contraindicated during pregnancy. It is unknown if it is excreted in breast milk.
Topical Ketoconazole Counseling: Patient counseled that this medication may cause skin irritation or allergic reactions.  In the event of skin irritation, the patient was advised to reduce the amount of the drug applied or use it less frequently.   The patient verbalized understanding of the proper use and possible adverse effects of ketoconazole.  All of the patient's questions and concerns were addressed.
Sotyktu Counseling:  I discussed the most common side effects of Sotyktu including: common cold, sore throat, sinus infections, cold sores, canker sores, folliculitis, and acne.  I also discussed more serious side effects of Sotyktu including but not limited to: serious allergic reactions; increased risk for infections such as TB; cancers such as lymphomas; rhabdomyolysis and elevated CPK; and elevated triglycerides and liver enzymes. 
Azelaic Acid Counseling: Patient counseled that medicine may cause skin irritation and to avoid applying near the eyes.  In the event of skin irritation, the patient was advised to reduce the amount of the drug applied or use it less frequently.   The patient verbalized understanding of the proper use and possible adverse effects of azelaic acid.  All of the patient's questions and concerns were addressed.
Winlevi Counseling:  I discussed with the patient the risks of topical clascoterone including but not limited to erythema, scaling, itching, and stinging. Patient voiced their understanding.
Finasteride Female Counseling: Finasteride Counseling:  I discussed with the patient the risks of use of finasteride including but not limited to decreased libido and sexual dysfunction. Explained the teratogenic nature of the medication and stressed the importance of not getting pregnant during treatment. All of the patient's questions and concerns were addressed.
Protopic Counseling: Patient may experience a mild burning sensation during topical application. Protopic is not approved in children less than 2 years of age. There have been case reports of hematologic and skin malignancies in patients using topical calcineurin inhibitors although causality is questionable.
Elidel Counseling: Patient may experience a mild burning sensation during topical application. Elidel is not approved in children less than 2 years of age. There have been case reports of hematologic and skin malignancies in patients using topical calcineurin inhibitors although causality is questionable.
Klisyri Pregnancy And Lactation Text: It is unknown if this medication can harm a developing fetus or if it is excreted in breast milk.
Hydroxychloroquine Pregnancy And Lactation Text: This medication has been shown to cause fetal harm but it isn't assigned a Pregnancy Risk Category. There are small amounts excreted in breast milk.
Fluconazole Counseling:  Patient counseled regarding adverse effects of fluconazole including but not limited to headache, diarrhea, nausea, upset stomach, liver function test abnormalities, taste disturbance, and stomach pain.  There is a rare possibility of liver failure that can occur when taking fluconazole.  The patient understands that monitoring of LFTs and kidney function test may be required, especially at baseline. The patient verbalized understanding of the proper use and possible adverse effects of fluconazole.  All of the patient's questions and concerns were addressed.
Albendazole Pregnancy And Lactation Text: This medication is Pregnancy Category C and it isn't known if it is safe during pregnancy. It is also excreted in breast milk.
Oral Minoxidil Counseling- I discussed with the patient the risks of oral minoxidil including but not limited to shortness of breath, swelling of the feet or ankles, dizziness, lightheadedness, unwanted hair growth and allergic reaction.  The patient verbalized understanding of the proper use and possible adverse effects of oral minoxidil.  All of the patient's questions and concerns were addressed.
Soolantra Pregnancy And Lactation Text: This medication is Pregnancy Category C. This medication is considered safe during breast feeding.
Sski Pregnancy And Lactation Text: This medication is Pregnancy Category D and isn't considered safe during pregnancy. It is excreted in breast milk.
Terbinafine Pregnancy And Lactation Text: This medication is Pregnancy Category B and is considered safe during pregnancy. It is also excreted in breast milk and breast feeding isn't recommended.
Isotretinoin Pregnancy And Lactation Text: This medication is Pregnancy Category X and is considered extremely dangerous during pregnancy. It is unknown if it is excreted in breast milk.
Cephalexin Counseling: I counseled the patient regarding use of cephalexin as an antibiotic for prophylactic and/or therapeutic purposes. Cephalexin (commonly prescribed under brand name Keflex) is a cephalosporin antibiotic which is active against numerous classes of bacteria, including most skin bacteria. Side effects may include nausea, diarrhea, gastrointestinal upset, rash, hives, yeast infections, and in rare cases, hepatitis, kidney disease, seizures, fever, confusion, neurologic symptoms, and others. Patients with severe allergies to penicillin medications are cautioned that there is about a 10% incidence of cross-reactivity with cephalosporins. When possible, patients with penicillin allergies should use alternatives to cephalosporins for antibiotic therapy.
Libtayo Pregnancy And Lactation Text: This medication is contraindicated in pregnancy and when breast feeding.
Birth Control Pills Counseling: Birth Control Pill Counseling: I discussed with the patient the potential side effects of OCPs including but not limited to increased risk of stroke, heart attack, thrombophlebitis, deep venous thrombosis, hepatic adenomas, breast changes, GI upset, headaches, and depression.  The patient verbalized understanding of the proper use and possible adverse effects of OCPs. All of the patient's questions and concerns were addressed.
Cibinqo Pregnancy And Lactation Text: It is unknown if this medication will adversely affect pregnancy or breast feeding.  You should not take this medication if you are currently pregnant or planning a pregnancy or while breastfeeding.
Opioid Pregnancy And Lactation Text: These medications can lead to premature delivery and should be avoided during pregnancy. These medications are also present in breast milk in small amounts.
Xeljanz Counseling: I discussed with the patient the risks of Xeljanz therapy including increased risk of infection, liver issues, headache, diarrhea, or cold symptoms. Live vaccines should be avoided. They were instructed to call if they have any problems.
Cosentyx Counseling:  I discussed with the patient the risks of Cosentyx including but not limited to worsening of Crohn's disease, immunosuppression, allergic reactions and infections.  The patient understands that monitoring is required including a PPD at baseline and must alert us or the primary physician if symptoms of infection or other concerning signs are noted.
Benzoyl Peroxide Counseling: Patient counseled that medicine may cause skin irritation and bleach clothing.  In the event of skin irritation, the patient was advised to reduce the amount of the drug applied or use it less frequently.   The patient verbalized understanding of the proper use and possible adverse effects of benzoyl peroxide.  All of the patient's questions and concerns were addressed.
Simponi Counseling:  I discussed with the patient the risks of golimumab including but not limited to myelosuppression, immunosuppression, autoimmune hepatitis, demyelinating diseases, lymphoma, and serious infections.  The patient understands that monitoring is required including a PPD at baseline and must alert us or the primary physician if symptoms of infection or other concerning signs are noted.
Cibinqo Counseling: I discussed with the patient the risks of Cibinqo therapy including but not limited to common cold, nausea, headache, cold sores, increased blood CPK levels, dizziness, UTIs, fatigue, acne, and vomitting. Live vaccines should be avoided.  This medication has been linked to serious infections; higher rate of mortality; malignancy and lymphoproliferative disorders; major adverse cardiovascular events; thrombosis; thrombocytopenia and lymphopenia; lipid elevations; and retinal detachment.
Sotyktu Pregnancy And Lactation Text: There is insufficient data to evaluate whether or not Sotyktu is safe to use during pregnancy.   It is not known if Sotyktu passes into breast milk and whether or not it is safe to use when breastfeeding.  
Low Dose Naltrexone Counseling- I discussed with the patient the potential risks and side effects of low dose naltrexone including but not limited to: more vivid dreams, headaches, nausea, vomiting, abdominal pain, fatigue, dizziness, and anxiety.
Oral Minoxidil Pregnancy And Lactation Text: This medication should only be used when clearly needed if you are pregnant, attempting to become pregnant or breast feeding.
Thalidomide Counseling: I discussed with the patient the risks of thalidomide including but not limited to birth defects, anxiety, weakness, chest pain, dizziness, cough and severe allergy.
Cephalexin Pregnancy And Lactation Text: This medication is Pregnancy Category B and considered safe during pregnancy.  It is also excreted in breast milk but can be used safely for shorter doses.
Winlevi Pregnancy And Lactation Text: This medication is considered safe during pregnancy and breastfeeding.
Topical Retinoid counseling:  Patient advised to apply a pea-sized amount only at bedtime and wait 30 minutes after washing their face before applying.  If too drying, patient may add a non-comedogenic moisturizer. The patient verbalized understanding of the proper use and possible adverse effects of retinoids.  All of the patient's questions and concerns were addressed.
Topical Metronidazole Counseling: Metronidazole is a topical antibiotic medication. You may experience burning, stinging, redness, or allergic reactions.  Please call our office if you develop any problems from using this medication.
Minoxidil Counseling: Minoxidil is a topical medication which can increase blood flow where it is applied. It is uncertain how this medication increases hair growth. Side effects are uncommon and include stinging and allergic reactions.
Protopic Pregnancy And Lactation Text: This medication is Pregnancy Category C. It is unknown if this medication is excreted in breast milk when applied topically.
Minocycline Counseling: Patient advised regarding possible photosensitivity and discoloration of the teeth, skin, lips, tongue and gums.  Patient instructed to avoid sunlight, if possible.  When exposed to sunlight, patients should wear protective clothing, sunglasses, and sunscreen.  The patient was instructed to call the office immediately if the following severe adverse effects occur:  hearing changes, easy bruising/bleeding, severe headache, or vision changes.  The patient verbalized understanding of the proper use and possible adverse effects of minocycline.  All of the patient's questions and concerns were addressed.
Cyclophosphamide Counseling:  I discussed with the patient the risks of cyclophosphamide including but not limited to hair loss, hormonal abnormalities, decreased fertility, abdominal pain, diarrhea, nausea and vomiting, bone marrow suppression and infection. The patient understands that monitoring is required while taking this medication.
Ivermectin Counseling:  Patient instructed to take medication on an empty stomach with a full glass of water.  Patient informed of potential adverse effects including but not limited to nausea, diarrhea, dizziness, itching, and swelling of the extremities or lymph nodes.  The patient verbalized understanding of the proper use and possible adverse effects of ivermectin.  All of the patient's questions and concerns were addressed.
High Dose Vitamin A Counseling: Side effects reviewed, pt to contact office should one occur.
Griseofulvin Counseling:  I discussed with the patient the risks of griseofulvin including but not limited to photosensitivity, cytopenia, liver damage, nausea/vomiting and severe allergy.  The patient understands that this medication is best absorbed when taken with a fatty meal (e.g., ice cream or french fries).
Odomzo Counseling- I discussed with the patient the risks of Odomzo including but not limited to nausea, vomiting, diarrhea, constipation, weight loss, changes in the sense of taste, decreased appetite, muscle spasms, and hair loss.  The patient verbalized understanding of the proper use and possible adverse effects of Odomzo.  All of the patient's questions and concerns were addressed.
Litfulo Counseling: I discussed with the patient the risks of Litfulo therapy including but not limited to upper respiratory tract infections, shingles, cold sores, and nausea. Live vaccines should be avoided.  This medication has been linked to serious infections; higher rate of mortality; malignancy and lymphoproliferative disorders; major adverse cardiovascular events; thrombosis; gastrointestinal perforations; neutropenia; lymphopenia; anemia; liver enzyme elevations; and lipid elevations.
Benzoyl Peroxide Pregnancy And Lactation Text: This medication is Pregnancy Category C. It is unknown if benzoyl peroxide is excreted in breast milk.
Xelginaz Pregnancy And Lactation Text: This medication is Pregnancy Category D and is not considered safe during pregnancy.  The risk during breast feeding is also uncertain.
Xolair Counseling:  Patient informed of potential adverse effects including but not limited to fever, muscle aches, rash and allergic reactions.  The patient verbalized understanding of the proper use and possible adverse effects of Xolair.  All of the patient's questions and concerns were addressed.
Birth Control Pills Pregnancy And Lactation Text: This medication should be avoided if pregnant and for the first 30 days post-partum.
Cimetidine Counseling:  I discussed with the patient the risks of Cimetidine including but not limited to gynecomastia, headache, diarrhea, nausea, drowsiness, arrhythmias, pancreatitis, skin rashes, psychosis, bone marrow suppression and kidney toxicity.
Niacinamide Counseling: I recommended taking niacin or niacinamide, also know as vitamin B3, twice daily. Recent evidence suggests that taking vitamin B3 (500 mg twice daily) can reduce the risk of actinic keratoses and non-melanoma skin cancers. Side effects of vitamin B3 include flushing and headache.
Otezla Pregnancy And Lactation Text: This medication is Pregnancy Category C and it isn't known if it is safe during pregnancy. It is unknown if it is excreted in breast milk.
Dapsone Counseling: I discussed with the patient the risks of dapsone including but not limited to hemolytic anemia, agranulocytosis, rashes, methemoglobinemia, kidney failure, peripheral neuropathy, headaches, GI upset, and liver toxicity.  Patients who start dapsone require monitoring including baseline LFTs and weekly CBCs for the first month, then every month thereafter.  The patient verbalized understanding of the proper use and possible adverse effects of dapsone.  All of the patient's questions and concerns were addressed.
Ilumya Counseling: I discussed with the patient the risks of tildrakizumab including but not limited to immunosuppression, malignancy, posterior leukoencephalopathy syndrome, and serious infections.  The patient understands that monitoring is required including a PPD at baseline and must alert us or the primary physician if symptoms of infection or other concerning signs are noted.
Tranexamic Acid Counseling:  Patient advised of the small risk of bleeding problems with tranexamic acid. They were also instructed to call if they developed any nausea, vomiting or diarrhea. All of the patient's questions and concerns were addressed.
Dupixent Counseling: I discussed with the patient the risks of dupilumab including but not limited to eye inflammation and irritation, cold sores, injection site reactions, allergic reactions and increased risk of parasitic infection. The patient understands that monitoring is required and they must alert us or the primary physician if symptoms of infection or other concerning signs are noted.
Low Dose Naltrexone Pregnancy And Lactation Text: Naltrexone is pregnancy category C.  There have been no adequate and well-controlled studies in pregnant women.  It should be used in pregnancy only if the potential benefit justifies the potential risk to the fetus.   Limited data indicates that naltrexone is minimally excreted into breastmilk.
Skyrizi Counseling: I discussed with the patient the risks of risankizumab-rzaa including but not limited to immunosuppression, and serious infections.  The patient understands that monitoring is required including a PPD at baseline and must alert us or the primary physician if symptoms of infection or other concerning signs are noted.
Otezla Counseling: The side effects of Otezla were discussed with the patient, including but not limited to worsening or new depression, weight loss, diarrhea, nausea, upper respiratory tract infection, and headache. Patient instructed to call the office should any adverse effect occur.  The patient verbalized understanding of the proper use and possible adverse effects of Otezla.  All the patient's questions and concerns were addressed.
Dapsone Pregnancy And Lactation Text: This medication is Pregnancy Category C and is not considered safe during pregnancy or breast feeding.
Rifampin Counseling: I discussed with the patient the risks of rifampin including but not limited to liver damage, kidney damage, red-orange body fluids, nausea/vomiting and severe allergy.
Cyclophosphamide Pregnancy And Lactation Text: This medication is Pregnancy Category D and it isn't considered safe during pregnancy. This medication is excreted in breast milk.
Clindamycin Counseling: I counseled the patient regarding use of clindamycin as an antibiotic for prophylactic and/or therapeutic purposes. Clindamycin is active against numerous classes of bacteria, including skin bacteria. Side effects may include nausea, diarrhea, gastrointestinal upset, rash, hives, yeast infections, and in rare cases, colitis.
High Dose Vitamin A Pregnancy And Lactation Text: High dose vitamin A therapy is contraindicated during pregnancy and breast feeding.
Arava Counseling:  Patient counseled regarding adverse effects of Arava including but not limited to nausea, vomiting, abnormalities in liver function tests. Patients may develop mouth sores, rash, diarrhea, and abnormalities in blood counts. The patient understands that monitoring is required including LFTs and blood counts.  There is a rare possibility of scarring of the liver and lung problems that can occur when taking methotrexate. Persistent nausea, loss of appetite, pale stools, dark urine, cough, and shortness of breath should be reported immediately. Patient advised to discontinue Arava treatment and consult with a physician prior to attempting conception. The patient will have to undergo a treatment to eliminate Arava from the body prior to conception.
Eucrisa Counseling: Patient may experience a mild burning sensation during topical application. Eucrisa is not approved in children less than 3 months of age.
VTAMA Counseling: I discussed with the patient that VTAMA is not for use in the eyes, mouth or mouth. They should call the office if they develop any signs of allergic reactions to VTAMA. The patient verbalized understanding of the proper use and possible adverse effects of VTAMA.  All of the patient's questions and concerns were addressed.
Topical Metronidazole Pregnancy And Lactation Text: This medication is Pregnancy Category B and considered safe during pregnancy.  It is also considered safe to use while breastfeeding.
Qbrexza Counseling:  I discussed with the patient the risks of Qbrexza including but not limited to headache, mydriasis, blurred vision, dry eyes, nasal dryness, dry mouth, dry throat, dry skin, urinary hesitation, and constipation.  Local skin reactions including erythema, burning, stinging, and itching can also occur.
Carac Counseling:  I discussed with the patient the risks of Carac including but not limited to erythema, scaling, itching, weeping, crusting, and pain.
Mirvaso Counseling: Mirvaso is a topical medication which can decrease superficial blood flow where applied. Side effects are uncommon and include stinging, redness and allergic reactions.
Spironolactone Counseling: Patient advised regarding risks of diarrhea, abdominal pain, hyperkalemia, birth defects (for female patients), liver toxicity and renal toxicity. The patient may need blood work to monitor liver and kidney function and potassium levels while on therapy. The patient verbalized understanding of the proper use and possible adverse effects of spironolactone.  All of the patient's questions and concerns were addressed.
Tazorac Counseling:  Patient advised that medication is irritating and drying.  Patient may need to apply sparingly and wash off after an hour before eventually leaving it on overnight.  The patient verbalized understanding of the proper use and possible adverse effects of tazorac.  All of the patient's questions and concerns were addressed.
Litfulo Pregnancy And Lactation Text: Based on animal studies, Lifulo may cause embryo-fetal harm when administered to pregnant women.  The medication should not be used in pregnancy.  Breastfeeding is not recommended during treatment.
Xolair Pregnancy And Lactation Text: This medication is Pregnancy Category B and is considered safe during pregnancy. This medication is excreted in breast milk.
Niacinamide Pregnancy And Lactation Text: These medications are considered safe during pregnancy.
Griseofulvin Pregnancy And Lactation Text: This medication is Pregnancy Category X and is known to cause serious birth defects. It is unknown if this medication is excreted in breast milk but breast feeding should be avoided.
Oxybutynin Counseling:  I discussed with the patient the risks of oxybutynin including but not limited to skin rash, drowsiness, dry mouth, difficulty urinating, and blurred vision.
Tranexamic Acid Pregnancy And Lactation Text: It is unknown if this medication is safe during pregnancy or breast feeding.
Gabapentin Counseling: I discussed with the patient the risks of gabapentin including but not limited to dizziness, somnolence, fatigue and ataxia.
Doxycycline Counseling:  Patient counseled regarding possible photosensitivity and increased risk for sunburn.  Patient instructed to avoid sunlight, if possible.  When exposed to sunlight, patients should wear protective clothing, sunglasses, and sunscreen.  The patient was instructed to call the office immediately if the following severe adverse effects occur:  hearing changes, easy bruising/bleeding, severe headache, or vision changes.  The patient verbalized understanding of the proper use and possible adverse effects of doxycycline.  All of the patient's questions and concerns were addressed.
Quinolones Counseling:  I discussed with the patient the risks of fluoroquinolones including but not limited to GI upset, allergic reaction, drug rash, diarrhea, dizziness, photosensitivity, yeast infections, liver function test abnormalities, tendonitis/tendon rupture.
Infliximab Counseling:  I discussed with the patient the risks of infliximab including but not limited to myelosuppression, immunosuppression, autoimmune hepatitis, demyelinating diseases, lymphoma, and serious infections.  The patient understands that monitoring is required including a PPD at baseline and must alert us or the primary physician if symptoms of infection or other concerning signs are noted.
Dupixent Pregnancy And Lactation Text: This medication likely crosses the placenta but the risk for the fetus is uncertain. This medication is excreted in breast milk.
Clindamycin Pregnancy And Lactation Text: This medication can be used in pregnancy if certain situations. Clindamycin is also present in breast milk.
Rifampin Pregnancy And Lactation Text: This medication is Pregnancy Category C and it isn't know if it is safe during pregnancy. It is also excreted in breast milk and should not be used if you are breast feeding.
Cyclosporine Counseling:  I discussed with the patient the risks of cyclosporine including but not limited to hypertension, gingival hyperplasia,myelosuppression, immunosuppression, liver damage, kidney damage, neurotoxicity, lymphoma, and serious infections. The patient understands that monitoring is required including baseline blood pressure, CBC, CMP, lipid panel and uric acid, and then 1-2 times monthly CMP and blood pressure.
Adbry Counseling: I discussed with the patient the risks of tralokinumab including but not limited to eye infection and irritation, cold sores, injection site reactions, worsening of asthma, allergic reactions and increased risk of parasitic infection.  Live vaccines should be avoided while taking tralokinumab. The patient understands that monitoring is required and they must alert us or the primary physician if symptoms of infection or other concerning signs are noted.
Doxepin Counseling:  Patient advised that the medication is sedating and not to drive a car after taking this medication. Patient informed of potential adverse effects including but not limited to dry mouth, urinary retention, and blurry vision.  The patient verbalized understanding of the proper use and possible adverse effects of doxepin.  All of the patient's questions and concerns were addressed.
Qbrexza Pregnancy And Lactation Text: There is no available data on Qbrexza use in pregnant women.  There is no available data on Qbrexza use in lactation.
Topical Steroids Counseling: I discussed with the patient that prolonged use of topical steroids can result in the increased appearance of superficial blood vessels (telangiectasias), lightening (hypopigmentation) and thinning of the skin (atrophy).  Patient understands to avoid using high potency steroids in skin folds, the groin or the face.  The patient verbalized understanding of the proper use and possible adverse effects of topical steroids.  All of the patient's questions and concerns were addressed.
Olumiant Counseling: I discussed with the patient the risks of Olumiant therapy including but not limited to upper respiratory tract infections, shingles, cold sores, and nausea. Live vaccines should be avoided.  This medication has been linked to serious infections; higher rate of mortality; malignancy and lymphoproliferative disorders; major adverse cardiovascular events; thrombosis; gastrointestinal perforations; neutropenia; lymphopenia; anemia; liver enzyme elevations; and lipid elevations.
Clofazimine Counseling:  I discussed with the patient the risks of clofazimine including but not limited to skin and eye pigmentation, liver damage, nausea/vomiting, gastrointestinal bleeding and allergy.
Hydroquinone Counseling:  Patient advised that medication may result in skin irritation, lightening (hypopigmentation), dryness, and burning.  In the event of skin irritation, the patient was advised to reduce the amount of the drug applied or use it less frequently.  Rarely, spots that are treated with hydroquinone can become darker (pseudoochronosis).  Should this occur, patient instructed to stop medication and call the office. The patient verbalized understanding of the proper use and possible adverse effects of hydroquinone.  All of the patient's questions and concerns were addressed.
Zoryve Counseling:  I discussed with the patient that Zoryve is not for use in the eyes, mouth or vagina. The most commonly reported side effects include diarrhea, headache, insomnia, application site pain, upper respiratory tract infections, and urinary tract infections.  All of the patient's questions and concerns were addressed.
Dutasteride Male Counseling: Dustasteride Counseling:  I discussed with the patient the risks of use of dutasteride including but not limited to decreased libido, decreased ejaculate volume, and gynecomastia. Women who can become pregnant should not handle medication.  All of the patient's questions and concerns were addressed.
Topical Steroids Applications Pregnancy And Lactation Text: Most topical steroids are considered safe to use during pregnancy and lactation.  Any topical steroid applied to the breast or nipple should be washed off before breastfeeding.
Valtrex Counseling: I discussed with the patient the risks of valacyclovir including but not limited to kidney damage, nausea, vomiting and severe allergy.  The patient understands that if the infection seems to be worsening or is not improving, they are to call.
Itraconazole Counseling:  I discussed with the patient the risks of itraconazole including but not limited to liver damage, nausea/vomiting, neuropathy, and severe allergy.  The patient understands that this medication is best absorbed when taken with acidic beverages such as non-diet cola or ginger ale.  The patient understands that monitoring is required including baseline LFTs and repeat LFTs at intervals.  The patient understands that they are to contact us or the primary physician if concerning signs are noted.
Rhofade Counseling: Rhofade is a topical medication which can decrease superficial blood flow where applied. Side effects are uncommon and include stinging, redness and allergic reactions.
Nsaids Counseling: NSAID Counseling: I discussed with the patient that NSAIDs should be taken with food. Prolonged use of NSAIDs can result in the development of stomach ulcers.  Patient advised to stop taking NSAIDs if abdominal pain occurs.  The patient verbalized understanding of the proper use and possible adverse effects of NSAIDs.  All of the patient's questions and concerns were addressed.
Methotrexate Counseling:  Patient counseled regarding adverse effects of methotrexate including but not limited to nausea, vomiting, abnormalities in liver function tests. Patients may develop mouth sores, rash, diarrhea, and abnormalities in blood counts. The patient understands that monitoring is required including LFT's and blood counts.  There is a rare possibility of scarring of the liver and lung problems that can occur when taking methotrexate. Persistent nausea, loss of appetite, pale stools, dark urine, cough, and shortness of breath should be reported immediately. Patient advised to discontinue methotrexate treatment at least three months before attempting to become pregnant.  I discussed the need for folate supplements while taking methotrexate.  These supplements can decrease side effects during methotrexate treatment. The patient verbalized understanding of the proper use and possible adverse effects of methotrexate.  All of the patient's questions and concerns were addressed.
Acitretin Pregnancy And Lactation Text: This medication is Pregnancy Category X and should not be given to women who are pregnant or may become pregnant in the future. This medication is excreted in breast milk.
Azithromycin Counseling:  I discussed with the patient the risks of azithromycin including but not limited to GI upset, allergic reaction, drug rash, diarrhea, and yeast infections.
Doxycycline Pregnancy And Lactation Text: This medication is Pregnancy Category D and not consider safe during pregnancy. It is also excreted in breast milk but is considered safe for shorter treatment courses.
Acitretin Counseling:  I discussed with the patient the risks of acitretin including but not limited to hair loss, dry lips/skin/eyes, liver damage, hyperlipidemia, depression/suicidal ideation, photosensitivity.  Serious rare side effects can include but are not limited to pancreatitis, pseudotumor cerebri, bony changes, clot formation/stroke/heart attack.  Patient understands that alcohol is contraindicated since it can result in liver toxicity and significantly prolong the elimination of the drug by many years.
Doxepin Pregnancy And Lactation Text: This medication is Pregnancy Category C and it isn't known if it is safe during pregnancy. It is also excreted in breast milk and breast feeding isn't recommended.
Sarecycline Counseling: Patient advised regarding possible photosensitivity and discoloration of the teeth, skin, lips, tongue and gums.  Patient instructed to avoid sunlight, if possible.  When exposed to sunlight, patients should wear protective clothing, sunglasses, and sunscreen.  The patient was instructed to call the office immediately if the following severe adverse effects occur:  hearing changes, easy bruising/bleeding, severe headache, or vision changes.  The patient verbalized understanding of the proper use and possible adverse effects of sarecycline.  All of the patient's questions and concerns were addressed.
Stelara Counseling:  I discussed with the patient the risks of ustekinumab including but not limited to immunosuppression, malignancy, posterior leukoencephalopathy syndrome, and serious infections.  The patient understands that monitoring is required including a PPD at baseline and must alert us or the primary physician if symptoms of infection or other concerning signs are noted.
Enbrel Counseling:  I discussed with the patient the risks of etanercept including but not limited to myelosuppression, immunosuppression, autoimmune hepatitis, demyelinating diseases, lymphoma, and infections.  The patient understands that monitoring is required including a PPD at baseline and must alert us or the primary physician if symptoms of infection or other concerning signs are noted.
Adbry Pregnancy And Lactation Text: It is unknown if this medication will adversely affect pregnancy or breast feeding.
Spironolactone Pregnancy And Lactation Text: This medication can cause feminization of the male fetus and should be avoided during pregnancy. The active metabolite is also found in breast milk.

## 2024-06-27 ENCOUNTER — APPOINTMENT (OUTPATIENT)
Dept: RADIOLOGY | Facility: IMAGING CENTER | Age: 59
End: 2024-06-27
Attending: NURSE PRACTITIONER
Payer: COMMERCIAL

## 2024-06-27 ENCOUNTER — OFFICE VISIT (OUTPATIENT)
Dept: URGENT CARE | Facility: PHYSICIAN GROUP | Age: 59
End: 2024-06-27
Payer: COMMERCIAL

## 2024-06-27 VITALS
HEART RATE: 89 BPM | WEIGHT: 193 LBS | RESPIRATION RATE: 16 BRPM | BODY MASS INDEX: 36.44 KG/M2 | DIASTOLIC BLOOD PRESSURE: 84 MMHG | SYSTOLIC BLOOD PRESSURE: 134 MMHG | TEMPERATURE: 97.3 F | OXYGEN SATURATION: 96 % | HEIGHT: 61 IN

## 2024-06-27 DIAGNOSIS — R05.3 PERSISTENT COUGH FOR 3 WEEKS OR LONGER: ICD-10-CM

## 2024-06-27 PROCEDURE — 99213 OFFICE O/P EST LOW 20 MIN: CPT | Performed by: NURSE PRACTITIONER

## 2024-06-27 PROCEDURE — 3075F SYST BP GE 130 - 139MM HG: CPT | Performed by: NURSE PRACTITIONER

## 2024-06-27 PROCEDURE — 3079F DIAST BP 80-89 MM HG: CPT | Performed by: NURSE PRACTITIONER

## 2024-06-27 PROCEDURE — 71046 X-RAY EXAM CHEST 2 VIEWS: CPT | Mod: TC,FY | Performed by: RADIOLOGY

## 2024-06-27 RX ORDER — DOXYCYCLINE HYCLATE 100 MG
100 TABLET ORAL 2 TIMES DAILY
Qty: 14 TABLET | Refills: 0 | Status: SHIPPED | OUTPATIENT
Start: 2024-06-27 | End: 2024-07-04

## 2024-06-27 RX ORDER — PREDNISONE 20 MG/1
40 TABLET ORAL DAILY
Qty: 14 TABLET | Refills: 0 | Status: SHIPPED | OUTPATIENT
Start: 2024-06-27 | End: 2024-07-04

## 2024-06-27 ASSESSMENT — ENCOUNTER SYMPTOMS
HEADACHES: 0
SHORTNESS OF BREATH: 1
RHINORRHEA: 1
WHEEZING: 0
FEVER: 0
COUGH: 1

## 2024-06-27 ASSESSMENT — FIBROSIS 4 INDEX: FIB4 SCORE: 0.86

## 2024-06-27 NOTE — PROGRESS NOTES
Subjective:     Ashley Johansen is a 58 y.o. female who presents for Cough (Pt reports chest tightness due to cough, loss of voice )      Cough  This is a new problem. The current episode started 1 to 4 weeks ago (Ashley is a pleasant 58 year old female who presents to  today with complaints of a persistant cough X 3 weeks. She states at the beginning of her illness she did suffer from congestion). Associated symptoms include rhinorrhea and shortness of breath. Pertinent negatives include no ear pain, fever, headaches or wheezing. She has tried nothing for the symptoms.         Review of Systems   Constitutional:  Negative for fever.   HENT:  Positive for rhinorrhea. Negative for ear pain.    Respiratory:  Positive for cough and shortness of breath. Negative for wheezing.    Neurological:  Negative for headaches.       PMH:   Past Medical History:   Diagnosis Date    Allergy     Anesthesia     clautraphobic unable to tolerated mask    Apnea, sleep     Arthritis     hands and feet    Daytime sleepiness     sometimes    Gasping for breath     Hemorrhoids     Indigestion     Infectious disease 5/2014    step throat    Insomnia     Migraines     Morning headache     PAF (paroxysmal atrial fibrillation) ([facility]) 6/24/2020    Pleurisy      ALLERGIES:   Allergies   Allergen Reactions    Codeine Vomiting, Swelling, Unspecified and Shortness of Breath     swelling    Kiwi Extract Hives    Other Environmental Hives     Kiwi fruit     SURGHX:   Past Surgical History:   Procedure Laterality Date    HYSTEROSCOPY NOVASURE-2  6/17/2014    Performed by [facility] M.D. at SURGERY SAME DAY [facility]    TUBAL COAGULATION LAPAROSCOPIC BILATERAL  1998 1998    APPENDECTOMY      BOWEL RESECTION      CYSTECTOMY      EXPLORATORY LAPAROTOMY      for surgical adhesions, polyps    HEMORRHOIDECTOMY      PRIMARY C SECTION       SOCHX:   Social History     Socioeconomic History    Marital status:    Tobacco Use    Smoking  "status: Former     Current packs/day: 0.00     Average packs/day: 0.5 packs/day for 6.0 years (3.0 ttl pk-yrs)     Types: Cigarettes     Start date: 1990     Quit date: 1996     Years since quittin.5    Smokeless tobacco: Never   Vaping Use    Vaping status: Never Used   Substance and Sexual Activity    Alcohol use: Yes     Comment: occ    Drug use: No    Sexual activity: Yes     FH:   Family History   Adopted: Yes   Problem Relation Age of Onset    Other Mother         hypothyroid    Diabetes Mother     Hypertension Mother     Hyperlipidemia Mother     Thyroid Mother     No Known Problems Father         Adopted by father    No Known Problems Maternal Grandmother     No Known Problems Maternal Grandfather     No Known Problems Sister         Half sister         Objective:   /84   Pulse 89   Temp 36.3 °C (97.3 °F) (Temporal)   Resp 16   Ht 1.549 m (5' 1\")   Wt 87.5 kg (193 lb)   SpO2 96%   BMI 36.47 kg/m²     Physical Exam  Vitals and nursing note reviewed.   Constitutional:       General: She is not in acute distress.     Appearance: Normal appearance. She is normal weight. She is ill-appearing. She is not toxic-appearing.   HENT:      Head: Normocephalic.      Right Ear: Tympanic membrane, ear canal and external ear normal.      Left Ear: Tympanic membrane, ear canal and external ear normal.      Nose: No congestion or rhinorrhea.      Mouth/Throat:      Mouth: Mucous membranes are moist.      Pharynx: No oropharyngeal exudate or posterior oropharyngeal erythema.      Comments: Hoarse voice  Eyes:      General:         Right eye: No discharge.         Left eye: No discharge.      Pupils: Pupils are equal, round, and reactive to light.   Cardiovascular:      Rate and Rhythm: Normal rate and regular rhythm.   Pulmonary:      Effort: Pulmonary effort is normal. No respiratory distress.      Breath sounds: No stridor. No wheezing, rhonchi or rales.   Chest:      Chest wall: No tenderness. "   Abdominal:      General: Abdomen is flat.   Musculoskeletal:         General: Normal range of motion.      Cervical back: Normal range of motion and neck supple.   Skin:     General: Skin is dry.   Neurological:      General: No focal deficit present.      Mental Status: She is alert and oriented to person, place, and time. Mental status is at baseline.   Psychiatric:         Mood and Affect: Mood normal.         Behavior: Behavior normal.         Thought Content: Thought content normal.         Judgment: Judgment normal.       DX-CHEST-2 VIEWS    Result Date: 6/27/2024 6/27/2024 1:00 PM HISTORY/REASON FOR EXAM:  Cough for 3 weeks. TECHNIQUE/EXAM DESCRIPTION AND NUMBER OF VIEWS: Two views of the chest. COMPARISON:  12/6/2023 FINDINGS: The mediastinal and cardiac silhouette is unremarkable. The pulmonary vascularity is within normal limits. The lung parenchyma is clear. There is no significant pleural effusion. There is no visible pneumothorax. There are no acute bony abnormalities.     1.  Unremarkable two view chest.     Assessment/Plan:   Assessment    1. Persistent cough for 3 weeks or longer  DX-CHEST-2 VIEWS    doxycycline (VIBRAMYCIN) 100 MG Tab    predniSONE (DELTASONE) 20 MG Tab      X-ray results discussed with patient.  We discussed supportive measures including humidifier, warm salt water gargles, over-the-counter Cepacol throat lozenges, rest  and increased fluids. Pt was encouraged to seek treatment back in the ER or urgent care for worsening symptoms,  fever greater than 100.5, wheezes or shortness of breath.  As her lower respiratory tract infection has been ongoing for the past 3 weeks she was empirically started on doxycycline and prednisone.

## 2024-07-17 ENCOUNTER — APPOINTMENT (RX ONLY)
Dept: URBAN - METROPOLITAN AREA CLINIC 4 | Facility: CLINIC | Age: 59
Setting detail: DERMATOLOGY
End: 2024-07-17

## 2024-07-17 DIAGNOSIS — Z71.89 OTHER SPECIFIED COUNSELING: ICD-10-CM

## 2024-07-17 DIAGNOSIS — L57.0 ACTINIC KERATOSIS: ICD-10-CM

## 2024-07-17 PROBLEM — C44.91 BASAL CELL CARCINOMA OF SKIN, UNSPECIFIED: Status: ACTIVE | Noted: 2024-07-17

## 2024-07-17 PROCEDURE — ? DIAGNOSIS COMMENT

## 2024-07-17 PROCEDURE — ? COUNSELING

## 2024-07-17 PROCEDURE — ? ADDITIONAL NOTES

## 2024-07-17 PROCEDURE — ? SUNSCREEN RECOMMENDATIONS

## 2024-07-17 PROCEDURE — 99213 OFFICE O/P EST LOW 20 MIN: CPT

## 2024-07-17 ASSESSMENT — LOCATION ZONE DERM
LOCATION ZONE: LIP
LOCATION ZONE: FACE

## 2024-07-17 ASSESSMENT — LOCATION DETAILED DESCRIPTION DERM
LOCATION DETAILED: LEFT CENTRAL BUCCAL CHEEK
LOCATION DETAILED: RIGHT PHILTRAL RIDGE

## 2024-07-17 ASSESSMENT — LOCATION SIMPLE DESCRIPTION DERM
LOCATION SIMPLE: RIGHT LIP
LOCATION SIMPLE: LEFT CHEEK

## 2024-07-17 NOTE — PROCEDURE: DIAGNOSIS COMMENT
Detail Level: Detailed
Comment: Ridgely remaining spots BID for 5-7 days with tanmay/5FU.
Render Risk Assessment In Note?: no

## 2024-07-17 NOTE — PROCEDURE: ADDITIONAL NOTES
Detail Level: Simple
Render Risk Assessment In Note?: no
Additional Notes: Biopsy proven [id number]
Additional Notes: Biopsy proven [id number]. Pending MOHS procedure.

## 2024-07-31 ENCOUNTER — APPOINTMENT (RX ONLY)
Dept: URBAN - METROPOLITAN AREA CLINIC 22 | Facility: CLINIC | Age: 59
Setting detail: DERMATOLOGY
End: 2024-07-31

## 2024-07-31 PROBLEM — C44.319 BASAL CELL CARCINOMA OF SKIN OF OTHER PARTS OF FACE: Status: ACTIVE | Noted: 2024-07-31

## 2024-07-31 PROCEDURE — ? MOHS SURGERY

## 2024-07-31 PROCEDURE — 12053 INTMD RPR FACE/MM 5.1-7.5 CM: CPT

## 2024-07-31 PROCEDURE — 17312 MOHS ADDL STAGE: CPT

## 2024-07-31 PROCEDURE — 17311 MOHS 1 STAGE H/N/HF/G: CPT

## 2024-07-31 NOTE — PROCEDURE: MOHS SURGERY
Mohs Case Number: [id number]
Previous Accession (Optional): [id number]
Biopsy Photograph Reviewed: Yes
Referring Physician (Optional): Deborah Massey PA-C
Consent Type: Consent 1 (Standard)
Eye Shield Used: No
Surgeon Performing Repair (Optional): Parviz Sotelo M.D.
Initial Size Of Lesion: 0.5
X Size Of Lesion In Cm (Optional): 0.3
Number Of Stages: 1
Primary Defect Length In Cm (Final Defect Size - Required For Flaps/Grafts): 2.1
Primary Defect Width In Cm (Final Defect Size - Required For Flaps/Grafts): 1.2
Primary Defect Depth In Cm (Optional But Required For Some Insurers): 0
Repair Type: Intermediate Layered Repair
Which Instrument Did You Use For Dermabrasion?: Wire Brush
Which Eyelid Repair Cpt Are You Using?: 63716
Oculoplastic Surgeon Procedure Text (A): After obtaining clear surgical margins the patient was sent to oculoplastics for surgical repair.  The patient understands they will receive post-surgical care and follow-up from the referring physician's office.
Otolaryngologist Procedure Text (A): After obtaining clear surgical margins the patient was sent to otolaryngology for surgical repair.  The patient understands they will receive post-surgical care and follow-up from the referring physician's office.
Plastic Surgeon Procedure Text (A): After obtaining clear surgical margins the patient was sent to plastics for surgical repair.  The patient understands they will receive post-surgical care and follow-up from the referring physician's office.
Mid-Level Procedure Text (A): After obtaining clear surgical margins the patient was sent to a mid-level provider for surgical repair.  The patient understands they will receive post-surgical care and follow-up from the mid-level provider.
Provider Procedure Text (A): After obtaining clear surgical margins the defect was repaired by another provider.
Asc Procedure Text (A): After obtaining clear surgical margins the patient was sent to an ASC for surgical repair.  The patient understands they will receive post-surgical care and follow-up from the ASC physician.
Simple / Intermediate / Complex Repair - Final Wound Length In Cm: 6.8
Suturegard Retention Suture: 2-0 Nylon
Retention Suture Bite Size: 3 mm
Length To Time In Minutes Device Was In Place: 10
Undermining Type: Entire Wound
Debridement Text: The wound edges were debrided prior to proceeding with the closure to facilitate wound healing.
Helical Rim Text: The closure involved the helical rim.
Vermilion Border Text: The closure involved the vermilion border.
Nostril Rim Text: The closure involved the nostril rim.
Retention Suture Text: Retention sutures were placed to support the closure and prevent dehiscence.
Area H Indication Text: Tumors in this location are included in Area H (eyelids, eyebrows, nose, lips, chin, ear, pre-auricular, post-auricular, temple, genitalia, hands, feet, ankles and areola).  Tissue conservation is critical in these anatomic locations.
Area M Indication Text: Tumors in this location are included in Area M (cheek, forehead, scalp, neck, jawline and pretibial skin).  Mohs surgery is indicated for tumors in these anatomic locations.
Area L Indication Text: Tumors in this location are included in Area L (trunk and extremities).  Mohs surgery is indicated for larger tumors, or tumors with aggressive histologic features, in these anatomic locations.
Tumor Debulked?: curette
Depth Of Tumor Invasion (For Histology): dermis
Perineural Invasion (For Histology - Be Specific If Possible): absent
Presence Of Scar Tissue (For Histology): present
Surgical Defect Width In Cm (Optional): 0.8
Special Stains Stage 1 - Results: Base On Clearance Noted Above
Stage 2: Additional Anesthesia Volume In Cc: 6
Stage 2: Additional Anesthesia Type: 1% lidocaine with epinephrine and a 1:10 solution of 8.4% sodium bicarbonate
Stage 4: Additional Anesthesia Type: 1% lidocaine with epinephrine
Include Tumor Staging In Mohs Note?: Please Select the Appropriate Response
Staging Info: By selecting yes to the question above you will include information on AJCC 8 tumor staging in your Mohs note. Information on tumor staging will be automatically added for SCCs on the head and neck. AJCC 8 includes tumor size, tumor depth, perineural involvement and bone invasion.
Tumor Depth: Less than 6mm from granular layer and no invasion beyond the subcutaneous fat
Medical Necessity Statement: Based on my medical judgement, Mohs surgery is the most appropriate treatment for this cancer compared to other treatments.
Alternatives Discussed Intro (Do Not Add Period): I discussed alternative treatments to Mohs surgery and specifically discussed the risks and benefits of
Consent 1/Introductory Paragraph: The rationale for Mohs was explained to the patient and consent was obtained. The risks, benefits and alternatives to therapy were discussed in detail. Specifically, the risks of infection, scarring, bleeding, prolonged wound healing, incomplete removal, allergy to anesthesia, nerve injury and recurrence were addressed. Prior to the procedure, the treatment site was clearly identified and confirmed by the patient. All components of Universal Protocol/PAUSE Rule completed.
Consent 2/Introductory Paragraph: Mohs surgery was explained to the patient and consent was obtained. The risks, benefits and alternatives to therapy were discussed in detail. Specifically, the risks of infection, scarring, bleeding, prolonged wound healing, incomplete removal, allergy to anesthesia, nerve injury and recurrence were addressed. Prior to the procedure, the treatment site was clearly identified and confirmed by the patient. All components of Universal Protocol/PAUSE Rule completed.
Consent 3/Introductory Paragraph: I gave the patient a chance to ask questions they had about the procedure.  Following this I explained the Mohs procedure and consent was obtained. The risks, benefits and alternatives to therapy were discussed in detail. Specifically, the risks of infection, scarring, bleeding, prolonged wound healing, incomplete removal, allergy to anesthesia, nerve injury and recurrence were addressed. Prior to the procedure, the treatment site was clearly identified and confirmed by the patient. All components of Universal Protocol/PAUSE Rule completed.
Consent (Temporal Branch)/Introductory Paragraph: The rationale for Mohs was explained to the patient and consent was obtained. The risks, benefits and alternatives to therapy were discussed in detail. Specifically, the risks of damage to the temporal branch of the facial nerve, infection, scarring, bleeding, prolonged wound healing, incomplete removal, allergy to anesthesia, and recurrence were addressed. Prior to the procedure, the treatment site was clearly identified and confirmed by the patient. All components of Universal Protocol/PAUSE Rule completed.
Consent (Marginal Mandibular)/Introductory Paragraph: The rationale for Mohs was explained to the patient and consent was obtained. The risks, benefits and alternatives to therapy were discussed in detail. Specifically, the risks of damage to the marginal mandibular branch of the facial nerve, infection, scarring, bleeding, prolonged wound healing, incomplete removal, allergy to anesthesia, and recurrence were addressed. Prior to the procedure, the treatment site was clearly identified and confirmed by the patient. All components of Universal Protocol/PAUSE Rule completed.
Consent (Spinal Accessory)/Introductory Paragraph: The rationale for Mohs was explained to the patient and consent was obtained. The risks, benefits and alternatives to therapy were discussed in detail. Specifically, the risks of damage to the spinal accessory nerve, infection, scarring, bleeding, prolonged wound healing, incomplete removal, allergy to anesthesia, and recurrence were addressed. Prior to the procedure, the treatment site was clearly identified and confirmed by the patient. All components of Universal Protocol/PAUSE Rule completed.
Consent (Near Eyelid Margin)/Introductory Paragraph: The rationale for Mohs was explained to the patient and consent was obtained. The risks, benefits and alternatives to therapy were discussed in detail. Specifically, the risks of ectropion or eyelid deformity, infection, scarring, bleeding, prolonged wound healing, incomplete removal, allergy to anesthesia, nerve injury and recurrence were addressed. Prior to the procedure, the treatment site was clearly identified and confirmed by the patient. All components of Universal Protocol/PAUSE Rule completed.
Consent (Ear)/Introductory Paragraph: The rationale for Mohs was explained to the patient and consent was obtained. The risks, benefits and alternatives to therapy were discussed in detail. Specifically, the risks of ear deformity, infection, scarring, bleeding, prolonged wound healing, incomplete removal, allergy to anesthesia, nerve injury and recurrence were addressed. Prior to the procedure, the treatment site was clearly identified and confirmed by the patient. All components of Universal Protocol/PAUSE Rule completed.
Consent (Nose)/Introductory Paragraph: The rationale for Mohs was explained to the patient and consent was obtained. The risks, benefits and alternatives to therapy were discussed in detail. Specifically, the risks of nasal deformity, changes in the flow of air through the nose, infection, scarring, bleeding, prolonged wound healing, incomplete removal, allergy to anesthesia, nerve injury and recurrence were addressed. Prior to the procedure, the treatment site was clearly identified and confirmed by the patient. All components of Universal Protocol/PAUSE Rule completed.
Consent (Lip)/Introductory Paragraph: The rationale for Mohs was explained to the patient and consent was obtained. The risks, benefits and alternatives to therapy were discussed in detail. Specifically, the risks of lip deformity, changes in the oral aperture, infection, scarring, bleeding, prolonged wound healing, incomplete removal, allergy to anesthesia, nerve injury and recurrence were addressed. Prior to the procedure, the treatment site was clearly identified and confirmed by the patient. All components of Universal Protocol/PAUSE Rule completed.
Consent (Scalp)/Introductory Paragraph: The rationale for Mohs was explained to the patient and consent was obtained. The risks, benefits and alternatives to therapy were discussed in detail. Specifically, the risks of changes in hair growth pattern secondary to repair, infection, scarring, bleeding, prolonged wound healing, incomplete removal, allergy to anesthesia, nerve injury and recurrence were addressed. Prior to the procedure, the treatment site was clearly identified and confirmed by the patient. All components of Universal Protocol/PAUSE Rule completed.
Detail Level: Detailed
Postop Diagnosis: same
Anesthesia Volume In Cc: 21
Hemostasis: Electrodesiccation
Estimated Blood Loss (Cc): minimal
Repair Anesthesia Method: local infiltration
Anesthesia Volume In Cc: 12
Undermining Location (Optional): in the deep fat
Brow Lift Text: A midfrontal incision was made medially to the defect to allow access to the tissues just superior to the left eyebrow. Following careful dissection inferiorly in a supraperiosteal plane to the level of the left eyebrow, several 3-0 monocryl sutures were used to resuspend the eyebrow orbicularis oculi muscular unit to the superior frontal bone periosteum. This resulted in an appropriate reapproximation of static eyebrow symmetry and correction of the left brow ptosis.
Deep Sutures: 4-0 Maxon
Epidermal Sutures: 6-0 Prolene
Epidermal Closure: running
Suturegard Intro: Intraoperative tissue expansion was performed, utilizing the SUTUREGARD device, in order to reduce wound tension.
Suturegard Body: The suture ends were repeatedly re-tightened and re-clamped to achieve the desired tissue expansion.
Hemigard Intro: Due to skin fragility and wound tension, it was decided to use HEMIGARD adhesive retention suture devices to permit a linear closure. The skin was cleaned and dried for a 6cm distance away from the wound. Excessive hair, if present, was removed to allow for adhesion.
Hemigard Postcare Instructions: The HEMIGARD strips are to remain completely dry for at least 5-7 days.
Donor Site Anesthesia Type: same as repair anesthesia
Epidermal Closure Graft Donor Site (Optional): simple interrupted
Graft Donor Site Bandage (Optional-Leave Blank If You Don't Want In Note): Steri-strips and a pressure bandage were applied to the donor site.
Closure 2 Information: This tab is for additional flaps and grafts, including complex repair and grafts and complex repair and flaps. You can also specify a different location for the additional defect, if the location is the same you do not need to select a new one. We will insert the automated text for the repair you select below just as we do for solitary flaps and grafts. Please note that at this time if you select a location with a different insurance zone you will need to override the ICD10 and CPT if appropriate.
Closure 3 Information: This tab is for additional flaps and grafts above and beyond our usual structured repairs.  Please note if you enter information here it will not currently bill and you will need to add the billing information manually.
Wound Care: Petrolatum
Dressing: pressure dressing with telfa
Dressing (No Sutures): dry sterile dressing
Suture Removal: 7 days
Unna Boot Text: An Unna boot was placed to help immobilize the limb and facilitate more rapid healing.
Home Suture Removal Text: Patient was provided instructions on removing sutures and will remove their sutures at home.  If they have any questions or difficulties they will call the office.
Post-Care Instructions: I reviewed with the patient in detail post-care instructions. Patient is not to engage in any heavy lifting, exercise, or swimming for the next 14 days. Should the patient develop any fevers, chills, bleeding, severe pain patient will contact the office immediately.
Pain Refusal Text: I offered to prescribe pain medication but the patient refused to take this medication.
Mauc Instructions: By selecting yes to the question below the MAUC number will be added into the note.  This will be calculated automatically based on the diagnosis chosen, the size entered, the body zone selected (H,M,L) and the specific indications you chose. You will also have the option to override the Mohs AUC if you disagree with the automatically calculated number and this option is found in the Case Summary tab.
Where Do You Want The Question To Include Opioid Counseling Located?: Case Summary Tab
Eye Protection Verbiage: Before proceeding with the stage, a plastic scleral shield was inserted. The globe was anesthetized with a few drops of 1% lidocaine with 1:100,000 epinephrine. Then, an appropriate sized scleral shield was chosen and coated with lacrilube ointment. The shield was gently inserted and left in place for the duration of each stage. After the stage was completed, the shield was gently removed.
Mohs Method Verbiage: An incision at a 45 degree angle following the standard Mohs approach was done and the specimen was harvested as a microscopic controlled layer.
Surgeon/Pathologist Verbiage (Will Incorporate Name Of Surgeon From Intro If Not Blank): operated in two distinct and integrated capacities as the surgeon and pathologist.
Mohs Histo Method Verbiage: Each section was then chromacoded and processed in the Mohs lab using the Mohs protocol and submitted for frozen section.
Subsequent Stages Histo Method Verbiage: Using a similar technique to that described above, a thin layer of tissue was removed from all areas where tumor was visible on the previous stage.  The tissue was again oriented, mapped, dyed, and processed as above.
Mohs Rapid Report Verbiage: The area of clinically evident tumor was marked with skin marking ink and appropriately hatched.  The initial incision was made following the Mohs approach through the skin.  The specimen was taken to the lab, divided into the necessary number of pieces, chromacoded and processed according to the Mohs protocol.  This was repeated in successive stages until a tumor free defect was achieved.
Complex Repair Preamble Text (Leave Blank If You Do Not Want): Extensive wide undermining was performed.
Intermediate Repair Preamble Text (Leave Blank If You Do Not Want): Undermining was performed with blunt dissection.
Graft Cartilage Fenestration Text: The cartilage was fenestrated with a 2mm punch biopsy to help facilitate graft survival and healing.
Non-Graft Cartilage Fenestration Text: The cartilage was fenestrated with a 2mm punch biopsy to help facilitate healing.
Secondary Intention Text (Leave Blank If You Do Not Want): The defect will heal with secondary intention.
No Repair - Repaired With Adjacent Surgical Defect Text (Leave Blank If You Do Not Want): After obtaining clear surgical margins the defect was repaired concurrently with another surgical defect which was in close approximation.
Adjacent Tissue Transfer Text: The defect edges were debeveled with a #15 scalpel blade. Given the location of the defect and the proximity to free margins an adjacent tissue transfer was deemed most appropriate. Using a sterile surgical marker, an appropriate flap was drawn incorporating the defect and placing the expected incisions within the relaxed skin tension lines where possible. The area thus outlined was incised deep to adipose tissue with a #15 scalpel blade. The skin margins were undermined to an appropriate distance in all directions utilizing iris scissors and carried over to close the primary defect.
Advancement Flap (Single) Text: The defect edges were debeveled with a #15 scalpel blade.  Given the location of the defect and the proximity to free margins a single advancement flap was deemed most appropriate.  Using a sterile surgical marker, an appropriate advancement flap was drawn incorporating the defect and placing the expected incisions within the relaxed skin tension lines where possible.    The area thus outlined was incised deep to adipose tissue with a #15 scalpel blade.  The skin margins were undermined to an appropriate distance in all directions utilizing iris scissors.
Advancement Flap (Double) Text: The defect edges were debeveled with a #15 scalpel blade.  Given the location of the defect and the proximity to free margins a double advancement flap was deemed most appropriate.  Using a sterile surgical marker, the appropriate advancement flaps were drawn incorporating the defect and placing the expected incisions within the relaxed skin tension lines where possible.    The area thus outlined was incised deep to adipose tissue with a #15 scalpel blade.  The skin margins were undermined to an appropriate distance in all directions utilizing iris scissors.
Advancement-Rotation Flap Text: The defect edges were debeveled with a #15 scalpel blade.  Given the location of the defect, shape of the defect and the proximity to free margins an advancement-rotation flap was deemed most appropriate.  Using a sterile surgical marker, an appropriate flap was drawn incorporating the defect and placing the expected incisions within the relaxed skin tension lines where possible. The area thus outlined was incised deep to adipose tissue with a #15 scalpel blade.  The skin margins were undermined to an appropriate distance in all directions utilizing iris scissors.
Alar Island Pedicle Flap Text: The defect edges were debeveled with a #15 scalpel blade.  Given the location of the defect, shape of the defect and the proximity to the alar rim an island pedicle advancement flap was deemed most appropriate.  Using a sterile surgical marker, an appropriate advancement flap was drawn incorporating the defect, outlining the appropriate donor tissue and placing the expected incisions within the nasal ala running parallel to the alar rim. The area thus outlined was incised with a #15 scalpel blade.  The skin margins were undermined minimally to an appropriate distance in all directions around the primary defect and laterally outward around the island pedicle utilizing iris scissors.  There was minimal undermining beneath the pedicle flap.
A-T Advancement Flap Text: The defect edges were debeveled with a #15 scalpel blade.  Given the location of the defect, shape of the defect and the proximity to free margins an A-T advancement flap was deemed most appropriate.  Using a sterile surgical marker, an appropriate advancement flap was drawn incorporating the defect and placing the expected incisions within the relaxed skin tension lines where possible.    The area thus outlined was incised deep to adipose tissue with a #15 scalpel blade.  The skin margins were undermined to an appropriate distance in all directions utilizing iris scissors.
Banner Transposition Flap Text: The defect edges were debeveled with a #15 scalpel blade.  Given the location of the defect and the proximity to free margins a Banner transposition flap was deemed most appropriate.  Using a sterile surgical marker, an appropriate flap drawn around the defect. The area thus outlined was incised deep to adipose tissue with a #15 scalpel blade.  The skin margins were undermined to an appropriate distance in all directions utilizing iris scissors.
Bilateral Helical Rim Advancement Flap Text: The defect edges were debeveled with a #15 blade scalpel.  Given the location of the defect and the proximity to free margins (helical rim) a bilateral helical rim advancement flap was deemed most appropriate.  Using a sterile surgical marker, the appropriate advancement flaps were drawn incorporating the defect and placing the expected incisions between the helical rim and antihelix where possible.  The area thus outlined was incised through and through with a #15 scalpel blade.  With a skin hook and iris scissors, the flaps were gently and sharply undermined and freed up.
Bilateral Rotation Flap Text: The defect edges were debeveled with a #15 scalpel blade. Given the location of the defect, shape of the defect and the proximity to free margins a bilateral rotation flap was deemed most appropriate. Using a sterile surgical marker, an appropriate rotation flap was drawn incorporating the defect and placing the expected incisions within the relaxed skin tension lines where possible. The area thus outlined was incised deep to adipose tissue with a #15 scalpel blade. The skin margins were undermined to an appropriate distance in all directions utilizing iris scissors. Following this, the designed flap was carried over into the primary defect and sutured into place.
Bilobed Flap Text: The defect edges were debeveled with a #15 scalpel blade.  Given the location of the defect and the proximity to free margins a bilobe flap was deemed most appropriate.  Using a sterile surgical marker, an appropriate bilobe flap drawn around the defect.    The area thus outlined was incised deep to adipose tissue with a #15 scalpel blade.  The skin margins were undermined to an appropriate distance in all directions utilizing iris scissors.
Bilobed Transposition Flap Text: The defect edges were debeveled with a #15 scalpel blade.  Given the location of the defect and the proximity to free margins a bilobed transposition flap was deemed most appropriate.  Using a sterile surgical marker, an appropriate bilobe flap drawn around the defect.    The area thus outlined was incised deep to adipose tissue with a #15 scalpel blade.  The skin margins were undermined to an appropriate distance in all directions utilizing iris scissors.
Bi-Rhombic Flap Text: The defect edges were debeveled with a #15 scalpel blade.  Given the location of the defect and the proximity to free margins a bi-rhombic flap was deemed most appropriate.  Using a sterile surgical marker, an appropriate rhombic flap was drawn incorporating the defect. The area thus outlined was incised deep to adipose tissue with a #15 scalpel blade.  The skin margins were undermined to an appropriate distance in all directions utilizing iris scissors.
Burow's Advancement Flap Text: The defect edges were debeveled with a #15 scalpel blade.  Given the location of the defect and the proximity to free margins a Burow's advancement flap was deemed most appropriate.  Using a sterile surgical marker, the appropriate advancement flap was drawn incorporating the defect and placing the expected incisions within the relaxed skin tension lines where possible.    The area thus outlined was incised deep to adipose tissue with a #15 scalpel blade.  The skin margins were undermined to an appropriate distance in all directions utilizing iris scissors.
Chonodrocutaneous Helical Advancement Flap Text: The defect edges were debeveled with a #15 scalpel blade. Given the location of the defect and the proximity to free margins a chondrocutaneous helical advancement flap was deemed most appropriate. Using a sterile surgical marker, the appropriate advancement flap was drawn incorporating the defect and placing the expected incisions within the relaxed skin tension lines where possible. The area thus outlined was incised deep to adipose tissue with a #15 scalpel blade. The skin margins were undermined to an appropriate distance in all directions utilizing iris scissors. Following this, the designed flap was advanced and carried over into the primary defect and sutured into place.
Crescentic Advancement Flap Text: The defect edges were debeveled with a #15 scalpel blade.  Given the location of the defect and the proximity to free margins a crescentic advancement flap was deemed most appropriate.  Using a sterile surgical marker, the appropriate advancement flap was drawn incorporating the defect and placing the expected incisions within the relaxed skin tension lines where possible.    The area thus outlined was incised deep to adipose tissue with a #15 scalpel blade.  The skin margins were undermined to an appropriate distance in all directions utilizing iris scissors.
Dorsal Nasal Flap Text: The defect edges were debeveled with a #15 scalpel blade.  Given the location of the defect and the proximity to free margins a dorsal nasal flap was deemed most appropriate.  Using a sterile surgical marker, an appropriate dorsal nasal flap was drawn around the defect.    The area thus outlined was incised deep to adipose tissue with a #15 scalpel blade.  The skin margins were undermined to an appropriate distance in all directions utilizing iris scissors.
Double Island Pedicle Flap Text: The defect edges were debeveled with a #15 scalpel blade.  Given the location of the defect, shape of the defect and the proximity to free margins a double island pedicle advancement flap was deemed most appropriate.  Using a sterile surgical marker, an appropriate advancement flap was drawn incorporating the defect, outlining the appropriate donor tissue and placing the expected incisions within the relaxed skin tension lines where possible.    The area thus outlined was incised deep to adipose tissue with a #15 scalpel blade.  The skin margins were undermined to an appropriate distance in all directions around the primary defect and laterally outward around the island pedicle utilizing iris scissors.  There was minimal undermining beneath the pedicle flap.
Double O-Z Flap Text: The defect edges were debeveled with a #15 scalpel blade. Given the location of the defect, shape of the defect and the proximity to free margins a Double O-Z flap was deemed most appropriate. Using a sterile surgical marker, an appropriate transposition flap was drawn incorporating the defect and placing the expected incisions within the relaxed skin tension lines where possible. The area thus outlined was incised deep to adipose tissue with a #15 scalpel blade. The skin margins were undermined to an appropriate distance in all directions utilizing iris scissors. Following this, the designed flap was carried over into the primary defect and sutured into place.
Double O-Z Plasty Text: The defect edges were debeveled with a #15 scalpel blade. Given the location of the defect, shape of the defect and the proximity to free margins a Double O-Z plasty (double transposition flap) was deemed most appropriate. Using a sterile surgical marker, the appropriate transposition flaps were drawn incorporating the defect and placing the expected incisions within the relaxed skin tension lines where possible. The area thus outlined was incised deep to adipose tissue with a #15 scalpel blade. The skin margins were undermined to an appropriate distance in all directions utilizing iris scissors. Hemostasis was achieved with electrocautery. The flaps were then transposed and carried over into place, one clockwise and the other counterclockwise, and anchored with interrupted buried subcutaneous sutures.
Double Z Plasty Text: The lesion was extirpated to the level of the fat with a #15 scalpel blade. Given the location of the defect, shape of the defect and the proximity to free margins a double Z-plasty was deemed most appropriate for repair. Using a sterile surgical marker, the appropriate transposition arms of the double Z-plasty were drawn incorporating the defect and placing the expected incisions within the relaxed skin tension lines where possible. The area thus outlined was incised deep to adipose tissue with a #15 scalpel blade. The skin margins were undermined to an appropriate distance in all directions utilizing iris scissors. The opposing transposition arms were then transposed and carried over into place in opposite direction and anchored with interrupted buried subcutaneous sutures.
Ear Star Wedge Flap Text: The defect edges were debeveled with a #15 blade scalpel.  Given the location of the defect and the proximity to free margins (helical rim) an ear star wedge flap was deemed most appropriate.  Using a sterile surgical marker, the appropriate flap was drawn incorporating the defect and placing the expected incisions between the helical rim and antihelix where possible.  The area thus outlined was incised through and through with a #15 scalpel blade.
Flip-Flop Flap Text: The defect edges were debeveled with a #15 blade scalpel.  Given the location of the defect and the proximity to free margins a flip-flop flap was deemed most appropriate. Using a sterile surgical marker, the appropriate flap was drawn incorporating the defect and placing the expected incisions between the helical rim and antihelix where possible.  The area thus outlined was incised through and through with a #15 scalpel blade. Following this, the designed flap was carried over into the primary defect and sutured into place.
Hatchet Flap Text: The defect edges were debeveled with a #15 scalpel blade.  Given the location of the defect, shape of the defect and the proximity to free margins a hatchet flap was deemed most appropriate.  Using a sterile surgical marker, an appropriate hatchet flap was drawn incorporating the defect and placing the expected incisions within the relaxed skin tension lines where possible.    The area thus outlined was incised deep to adipose tissue with a #15 scalpel blade.  The skin margins were undermined to an appropriate distance in all directions utilizing iris scissors.
Helical Rim Advancement Flap Text: The defect edges were debeveled with a #15 blade scalpel.  Given the location of the defect and the proximity to free margins (helical rim) a double helical rim advancement flap was deemed most appropriate.  Using a sterile surgical marker, the appropriate advancement flaps were drawn incorporating the defect and placing the expected incisions between the helical rim and antihelix where possible.  The area thus outlined was incised through and through with a #15 scalpel blade.  With a skin hook and iris scissors, the flaps were gently and sharply undermined and freed up.
H Plasty Text: Given the location of the defect, shape of the defect and the proximity to free margins a H-plasty was deemed most appropriate for repair.  Using a sterile surgical marker, the appropriate advancement arms of the H-plasty were drawn incorporating the defect and placing the expected incisions within the relaxed skin tension lines where possible. The area thus outlined was incised deep to adipose tissue with a #15 scalpel blade. The skin margins were undermined to an appropriate distance in all directions utilizing iris scissors.  The opposing advancement arms were then advanced into place in opposite direction and anchored with interrupted buried subcutaneous sutures.
Island Pedicle Flap Text: The defect edges were debeveled with a #15 scalpel blade.  Given the location of the defect, shape of the defect and the proximity to free margins an island pedicle advancement flap was deemed most appropriate.  Using a sterile surgical marker, an appropriate advancement flap was drawn incorporating the defect, outlining the appropriate donor tissue and placing the expected incisions within the relaxed skin tension lines where possible.    The area thus outlined was incised deep to adipose tissue with a #15 scalpel blade.  The skin margins were undermined to an appropriate distance in all directions around the primary defect and laterally outward around the island pedicle utilizing iris scissors.  There was minimal undermining beneath the pedicle flap.
Island Pedicle Flap With Canthal Suspension Text: The defect edges were debeveled with a #15 scalpel blade.  Given the location of the defect, shape of the defect and the proximity to free margins an island pedicle advancement flap was deemed most appropriate.  Using a sterile surgical marker, an appropriate advancement flap was drawn incorporating the defect, outlining the appropriate donor tissue and placing the expected incisions within the relaxed skin tension lines where possible. The area thus outlined was incised deep to adipose tissue with a #15 scalpel blade.  The skin margins were undermined to an appropriate distance in all directions around the primary defect and laterally outward around the island pedicle utilizing iris scissors.  There was minimal undermining beneath the pedicle flap. A suspension suture was placed in the canthal tendon to prevent tension and prevent ectropion.
Island Pedicle Flap-Requiring Vessel Identification Text: The defect edges were debeveled with a #15 scalpel blade.  Given the location of the defect, shape of the defect and the proximity to free margins an island pedicle advancement flap was deemed most appropriate.  Using a sterile surgical marker, an appropriate advancement flap was drawn, based on the axial vessel mentioned above, incorporating the defect, outlining the appropriate donor tissue and placing the expected incisions within the relaxed skin tension lines where possible.    The area thus outlined was incised deep to adipose tissue with a #15 scalpel blade.  The skin margins were undermined to an appropriate distance in all directions around the primary defect and laterally outward around the island pedicle utilizing iris scissors.  There was minimal undermining beneath the pedicle flap.
Keystone Flap Text: The defect edges were debeveled with a #15 scalpel blade.  Given the location of the defect, shape of the defect a keystone flap was deemed most appropriate.  Using a sterile surgical marker, an appropriate keystone flap was drawn incorporating the defect, outlining the appropriate donor tissue and placing the expected incisions within the relaxed skin tension lines where possible. The area thus outlined was incised deep to adipose tissue with a #15 scalpel blade.  The skin margins were undermined to an appropriate distance in all directions around the primary defect and laterally outward around the flap utilizing iris scissors.
Melolabial Transposition Flap Text: The defect edges were debeveled with a #15 scalpel blade.  Given the location of the defect and the proximity to free margins a melolabial flap was deemed most appropriate.  Using a sterile surgical marker, an appropriate melolabial transposition flap was drawn incorporating the defect.    The area thus outlined was incised deep to adipose tissue with a #15 scalpel blade.  The skin margins were undermined to an appropriate distance in all directions utilizing iris scissors.
Mercedes Flap Text: The defect edges were debeveled with a #15 scalpel blade.  Given the location of the defect, shape of the defect and the proximity to free margins a Mercedes flap was deemed most appropriate.  Using a sterile surgical marker, an appropriate advancement flap was drawn incorporating the defect and placing the expected incisions within the relaxed skin tension lines where possible. The area thus outlined was incised deep to adipose tissue with a #15 scalpel blade.  The skin margins were undermined to an appropriate distance in all directions utilizing iris scissors.
Modified Advancement Flap Text: The defect edges were debeveled with a #15 scalpel blade.  Given the location of the defect, shape of the defect and the proximity to free margins a modified advancement flap was deemed most appropriate.  Using a sterile surgical marker, an appropriate advancement flap was drawn incorporating the defect and placing the expected incisions within the relaxed skin tension lines where possible.    The area thus outlined was incised deep to adipose tissue with a #15 scalpel blade.  The skin margins were undermined to an appropriate distance in all directions utilizing iris scissors.
Mucosal Advancement Flap Text: Given the location of the defect, shape of the defect and the proximity to free margins a mucosal advancement flap was deemed most appropriate. Incisions were made with a 15 blade scalpel in the appropriate fashion along the cutaneous vermilion border and the mucosal lip. The remaining actinically damaged mucosal tissue was excised.  The mucosal advancement flap was then elevated to the gingival sulcus with care taken to preserve the neurovascular structures and advanced into the primary defect. Care was taken to ensure that precise realignment of the vermilion border was achieved.
Muscle Hinge Flap Text: The defect edges were debeveled with a #15 scalpel blade.  Given the size, depth and location of the defect and the proximity to free margins a muscle hinge flap was deemed most appropriate.  Using a sterile surgical marker, an appropriate hinge flap was drawn incorporating the defect. The area thus outlined was incised with a #15 scalpel blade.  The skin margins were undermined to an appropriate distance in all directions utilizing iris scissors.
Mustarde Flap Text: The defect edges were debeveled with a #15 scalpel blade.  Given the size, depth and location of the defect and the proximity to free margins a Mustarde flap was deemed most appropriate. Using a sterile surgical marker, an appropriate flap was drawn incorporating the defect. The area thus outlined was incised with a #15 scalpel blade. The skin margins were undermined to an appropriate distance in all directions utilizing iris scissors. Following this, the designed flap was carried into the primary defect and sutured into place.
Nasal Turnover Hinge Flap Text: The defect edges were debeveled with a #15 scalpel blade.  Given the size, depth, location of the defect and the defect being full thickness a nasal turnover hinge flap was deemed most appropriate. Using a sterile surgical marker, an appropriate hinge flap was drawn incorporating the defect. The area thus outlined was incised with a #15 scalpel blade. The flap was designed to recreate the nasal mucosal lining and the alar rim. The skin margins were undermined to an appropriate distance in all directions utilizing iris scissors. Following this, the designed flap was carried over into the primary defect and sutured into place
Nasalis-Muscle-Based Myocutaneous Island Pedicle Flap Text: Using a #15 blade, an incision was made around the donor flap to the level of the nasalis muscle. Wide lateral undermining was then performed in both the subcutaneous plane above the nasalis muscle, and in a submuscular plane just above periosteum. This allowed the formation of a free nasalis muscle axial pedicle (based on the angular artery) which was still attached to the actual cutaneous flap, increasing its mobility and vascular viability. Hemostasis was obtained with pinpoint electrocoagulation. The flap was mobilized into position and the pivotal anchor points positioned and stabilized with buried interrupted sutures. Subcutaneous and dermal tissues were closed in a multilayered fashion with sutures. Tissue redundancies were excised, and the epidermal edges were apposed without significant tension and sutured with sutures.
Nasalis Myocutaneous Flap Text: Using a #15 blade, an incision was made around the donor flap to the level of the nasalis muscle. Wide lateral undermining was then performed in both the subcutaneous plane above the nasalis muscle, and in a submuscular plane just above periosteum. This allowed the formation of a free nasalis muscle axial pedicle which was still attached to the actual cutaneous flap, increasing its mobility and vascular viability. Hemostasis was obtained with pinpoint electrocoagulation. The flap was mobilized into position and the pivotal anchor points positioned and stabilized with buried interrupted sutures. Subcutaneous and dermal tissues were closed in a multilayered fashion with sutures. Tissue redundancies were excised, and the epidermal edges were apposed without significant tension and sutured with sutures.
Nasolabial Transposition Flap Text: The defect edges were debeveled with a #15 scalpel blade.  Given the size, depth and location of the defect and the proximity to free margins a nasolabial transposition flap was deemed most appropriate. Using a sterile surgical marker, an appropriate flap was drawn incorporating the defect. The area thus outlined was incised with a #15 scalpel blade. The skin margins were undermined to an appropriate distance in all directions utilizing iris scissors. Following this, the designed flap was carried into the primary defect and sutured into place.
Orbicularis Oris Muscle Flap Text: The defect edges were debeveled with a #15 scalpel blade.  Given that the defect affected the competency of the oral sphincter an orbicularis oris muscle flap was deemed most appropriate to restore this competency and normal muscle function.  Using a sterile surgical marker, an appropriate flap was drawn incorporating the defect. The area thus outlined was incised with a #15 scalpel blade. Following this, the designed flap was carried over into the primary defect and sutured into place.
O-T Advancement Flap Text: The defect edges were debeveled with a #15 scalpel blade.  Given the location of the defect, shape of the defect and the proximity to free margins an O-T advancement flap was deemed most appropriate.  Using a sterile surgical marker, an appropriate advancement flap was drawn incorporating the defect and placing the expected incisions within the relaxed skin tension lines where possible.    The area thus outlined was incised deep to adipose tissue with a #15 scalpel blade.  The skin margins were undermined to an appropriate distance in all directions utilizing iris scissors.
O-T Plasty Text: The defect edges were debeveled with a #15 scalpel blade.  Given the location of the defect, shape of the defect and the proximity to free margins an O-T plasty was deemed most appropriate.  Using a sterile surgical marker, an appropriate O-T plasty was drawn incorporating the defect and placing the expected incisions within the relaxed skin tension lines where possible.    The area thus outlined was incised deep to adipose tissue with a #15 scalpel blade.  The skin margins were undermined to an appropriate distance in all directions utilizing iris scissors.
O-L Flap Text: The defect edges were debeveled with a #15 scalpel blade.  Given the location of the defect, shape of the defect and the proximity to free margins an O-L flap was deemed most appropriate.  Using a sterile surgical marker, an appropriate advancement flap was drawn incorporating the defect and placing the expected incisions within the relaxed skin tension lines where possible.    The area thus outlined was incised deep to adipose tissue with a #15 scalpel blade.  The skin margins were undermined to an appropriate distance in all directions utilizing iris scissors.
O-Z Flap Text: The defect edges were debeveled with a #15 scalpel blade. Given the location of the defect, shape of the defect and the proximity to free margins an O-Z flap was deemed most appropriate. Using a sterile surgical marker, an appropriate transposition flap was drawn incorporating the defect and placing the expected incisions within the relaxed skin tension lines where possible. The area thus outlined was incised deep to adipose tissue with a #15 scalpel blade. The skin margins were undermined to an appropriate distance in all directions utilizing iris scissors. Following this, the designed flap was carried over into the primary defect and sutured into place.
O-Z Plasty Text: The defect edges were debeveled with a #15 scalpel blade.  Given the location of the defect, shape of the defect and the proximity to free margins an O-Z plasty (double transposition flap) was deemed most appropriate.  Using a sterile surgical marker, the appropriate transposition flaps were drawn incorporating the defect and placing the expected incisions within the relaxed skin tension lines where possible.    The area thus outlined was incised deep to adipose tissue with a #15 scalpel blade.  The skin margins were undermined to an appropriate distance in all directions utilizing iris scissors.  Hemostasis was achieved with electrocautery.  The flaps were then transposed into place, one clockwise and the other counterclockwise, and anchored with interrupted buried subcutaneous sutures.
Peng Advancement Flap Text: The defect edges were debeveled with a #15 scalpel blade. Given the location of the defect, shape of the defect and the proximity to free margins a Peng advancement flap was deemed most appropriate. Using a sterile surgical marker, an appropriate advancement flap was drawn incorporating the defect and placing the expected incisions within the relaxed skin tension lines where possible. The area thus outlined was incised deep to adipose tissue with a #15 scalpel blade. The skin margins were undermined to an appropriate distance in all directions utilizing iris scissors. Following this, the designed flap was advanced and carried over into the primary defect and sutured into place.
Rectangular Flap Text: The defect edges were debeveled with a #15 scalpel blade. Given the location of the defect and the proximity to free margins a rectangular flap was deemed most appropriate. Using a sterile surgical marker, an appropriate rectangular flap was drawn incorporating the defect. The area thus outlined was incised deep to adipose tissue with a #15 scalpel blade. The skin margins were undermined to an appropriate distance in all directions utilizing iris scissors. Following this, the designed flap was carried over into the primary defect and sutured into place.
Rhombic Flap Text: The defect edges were debeveled with a #15 scalpel blade.  Given the location of the defect and the proximity to free margins a rhombic flap was deemed most appropriate.  Using a sterile surgical marker, an appropriate rhombic flap was drawn incorporating the defect.    The area thus outlined was incised deep to adipose tissue with a #15 scalpel blade.  The skin margins were undermined to an appropriate distance in all directions utilizing iris scissors.
Rhomboid Transposition Flap Text: The defect edges were debeveled with a #15 scalpel blade. Given the location of the defect and the proximity to free margins a rhomboid transposition flap was deemed most appropriate. Using a sterile surgical marker, an appropriate rhomboid flap was drawn incorporating the defect. The area thus outlined was incised deep to adipose tissue with a #15 scalpel blade. The skin margins were undermined to an appropriate distance in all directions utilizing iris scissors. Following this, the designed flap was carried over into the primary defect and sutured into place.
Rotation Flap Text: The defect edges were debeveled with a #15 scalpel blade.  Given the location of the defect, shape of the defect and the proximity to free margins a rotation flap was deemed most appropriate.  Using a sterile surgical marker, an appropriate rotation flap was drawn incorporating the defect and placing the expected incisions within the relaxed skin tension lines where possible.    The area thus outlined was incised deep to adipose tissue with a #15 scalpel blade.  The skin margins were undermined to an appropriate distance in all directions utilizing iris scissors.
Spiral Flap Text: The defect edges were debeveled with a #15 scalpel blade.  Given the location of the defect, shape of the defect and the proximity to free margins a spiral flap was deemed most appropriate.  Using a sterile surgical marker, an appropriate rotation flap was drawn incorporating the defect and placing the expected incisions within the relaxed skin tension lines where possible. The area thus outlined was incised deep to adipose tissue with a #15 scalpel blade.  The skin margins were undermined to an appropriate distance in all directions utilizing iris scissors.
Staged Advancement Flap Text: The defect edges were debeveled with a #15 scalpel blade. Given the location of the defect, shape of the defect and the proximity to free margins a staged advancement flap was deemed most appropriate. Using a sterile surgical marker, an appropriate advancement flap was drawn incorporating the defect and placing the expected incisions within the relaxed skin tension lines where possible. The area thus outlined was incised deep to adipose tissue with a #15 scalpel blade. The skin margins were undermined to an appropriate distance in all directions utilizing iris scissors. Following this, the designed flap was carried over into the primary defect and sutured into place.
Star Wedge Flap Text: The defect edges were debeveled with a #15 scalpel blade.  Given the location of the defect, shape of the defect and the proximity to free margins a star wedge flap was deemed most appropriate.  Using a sterile surgical marker, an appropriate rotation flap was drawn incorporating the defect and placing the expected incisions within the relaxed skin tension lines where possible. The area thus outlined was incised deep to adipose tissue with a #15 scalpel blade.  The skin margins were undermined to an appropriate distance in all directions utilizing iris scissors.
Transposition Flap Text: The defect edges were debeveled with a #15 scalpel blade.  Given the location of the defect and the proximity to free margins a transposition flap was deemed most appropriate.  Using a sterile surgical marker, an appropriate transposition flap was drawn incorporating the defect.    The area thus outlined was incised deep to adipose tissue with a #15 scalpel blade.  The skin margins were undermined to an appropriate distance in all directions utilizing iris scissors.
Trilobed Flap Text: The defect edges were debeveled with a #15 scalpel blade.  Given the location of the defect and the proximity to free margins a trilobed flap was deemed most appropriate.  Using a sterile surgical marker, an appropriate trilobed flap drawn around the defect.    The area thus outlined was incised deep to adipose tissue with a #15 scalpel blade.  The skin margins were undermined to an appropriate distance in all directions utilizing iris scissors.
V-Y Flap Text: The defect edges were debeveled with a #15 scalpel blade.  Given the location of the defect, shape of the defect and the proximity to free margins a V-Y flap was deemed most appropriate.  Using a sterile surgical marker, an appropriate advancement flap was drawn incorporating the defect and placing the expected incisions within the relaxed skin tension lines where possible.    The area thus outlined was incised deep to adipose tissue with a #15 scalpel blade.  The skin margins were undermined to an appropriate distance in all directions utilizing iris scissors.
V-Y Plasty Text: The defect edges were debeveled with a #15 scalpel blade.  Given the location of the defect, shape of the defect and the proximity to free margins an V-Y advancement flap was deemed most appropriate.  Using a sterile surgical marker, an appropriate advancement flap was drawn incorporating the defect and placing the expected incisions within the relaxed skin tension lines where possible.    The area thus outlined was incised deep to adipose tissue with a #15 scalpel blade.  The skin margins were undermined to an appropriate distance in all directions utilizing iris scissors.
W Plasty Text: The lesion was extirpated to the level of the fat with a #15 scalpel blade.  Given the location of the defect, shape of the defect and the proximity to free margins a W-plasty was deemed most appropriate for repair.  Using a sterile surgical marker, the appropriate transposition arms of the W-plasty were drawn incorporating the defect and placing the expected incisions within the relaxed skin tension lines where possible.    The area thus outlined was incised deep to adipose tissue with a #15 scalpel blade.  The skin margins were undermined to an appropriate distance in all directions utilizing iris scissors.  The opposing transposition arms were then transposed into place in opposite direction and anchored with interrupted buried subcutaneous sutures.
Z Plasty Text: The lesion was extirpated to the level of the fat with a #15 scalpel blade.  Given the location of the defect, shape of the defect and the proximity to free margins a Z-plasty was deemed most appropriate for repair.  Using a sterile surgical marker, the appropriate transposition arms of the Z-plasty were drawn incorporating the defect and placing the expected incisions within the relaxed skin tension lines where possible.    The area thus outlined was incised deep to adipose tissue with a #15 scalpel blade.  The skin margins were undermined to an appropriate distance in all directions utilizing iris scissors.  The opposing transposition arms were then transposed into place in opposite direction and anchored with interrupted buried subcutaneous sutures.
Zygomaticofacial Flap Text: Given the location of the defect, shape of the defect and the proximity to free margins a zygomaticofacial flap was deemed most appropriate for repair. Using a sterile surgical marker, the appropriate flap was drawn incorporating the defect and placing the expected incisions within the relaxed skin tension lines where possible. The area thus outlined was incised deep to adipose tissue with a #15 scalpel blade with preservation of a vascular pedicle.  The skin margins were undermined to an appropriate distance in all directions utilizing iris scissors. The flap was then carried over into the defect and anchored with interrupted buried subcutaneous sutures.
Abbe Flap (Lower To Upper Lip) Text: The defect of the upper lip was assessed and measured.  Given the location and size of the defect, an Abbe flap was deemed most appropriate. Using a sterile surgical marker, an appropriate Abbe flap was measured and drawn on the lower lip. Local anesthesia was then infiltrated. A scalpel was then used to incise the upper lip through and through the skin, vermilion, muscle and mucosa, leaving the flap pedicled on the opposite side.  The flap was then rotated and transferred to the lower lip defect.  The flap was then sutured into place with a three layer technique, closing the orbicularis oris muscle layer with subcutaneous buried sutures, followed by a mucosal layer and an epidermal layer.
Abbe Flap (Upper To Lower Lip) Text: The defect of the lower lip was assessed and measured.  Given the location and size of the defect, an Abbe flap was deemed most appropriate. Using a sterile surgical marker, an appropriate Abbe flap was measured and drawn on the upper lip. Local anesthesia was then infiltrated.  A scalpel was then used to incise the upper lip through and through the skin, vermilion, muscle and mucosa, leaving the flap pedicled on the opposite side.  The flap was then rotated and transferred to the lower lip defect.  The flap was then sutured into place with a three layer technique, closing the orbicularis oris muscle layer with subcutaneous buried sutures, followed by a mucosal layer and an epidermal layer.
Cheek Interpolation Flap Text: A decision was made to reconstruct the defect utilizing an interpolation axial flap and a staged reconstruction.  A telfa template was made of the defect.  This telfa template was then used to outline the Cheek Interpolation flap.  The donor area for the pedicle flap was then injected with anesthesia.  The flap was excised through the skin and subcutaneous tissue down to the layer of the underlying musculature.  The interpolation flap was carefully excised within this deep plane to maintain its blood supply.  The edges of the donor site were undermined.   The donor site was closed in a primary fashion.  The pedicle was then rotated into position and sutured.  Once the tube was sutured into place, adequate blood supply was confirmed with blanching and refill.  The pedicle was then wrapped with xeroform gauze and dressed appropriately with a telfa and gauze bandage to ensure continued blood supply and protect the attached pedicle.
Cheek-To-Nose Interpolation Flap Text: A decision was made to reconstruct the defect utilizing an interpolation axial flap and a staged reconstruction.  A telfa template was made of the defect.  This telfa template was then used to outline the Cheek-To-Nose Interpolation flap.  The donor area for the pedicle flap was then injected with anesthesia.  The flap was excised through the skin and subcutaneous tissue down to the layer of the underlying musculature.  The interpolation flap was carefully excised within this deep plane to maintain its blood supply.  The edges of the donor site were undermined.   The donor site was closed in a primary fashion.  The pedicle was then rotated into position and sutured.  Once the tube was sutured into place, adequate blood supply was confirmed with blanching and refill.  The pedicle was then wrapped with xeroform gauze and dressed appropriately with a telfa and gauze bandage to ensure continued blood supply and protect the attached pedicle.
Estlander Flap (Lower To Upper Lip) Text: The defect of the lower lip was assessed and measured.  Given the location and size of the defect, an Estlander flap was deemed most appropriate. Using a sterile surgical marker, an appropriate Estlander flap was measured and drawn on the upper lip. Local anesthesia was then infiltrated. A scalpel was then used to incise the lateral aspect of the flap, through skin, muscle and mucosa, leaving the flap pedicled medially.  The flap was then rotated and positioned to fill the lower lip defect.  The flap was then sutured into place with a three layer technique, closing the orbicularis oris muscle layer with subcutaneous buried sutures, followed by a mucosal layer and an epidermal layer.
Interpolation Flap Text: A decision was made to reconstruct the defect utilizing an interpolation axial flap and a staged reconstruction.  A telfa template was made of the defect.  This telfa template was then used to outline the interpolation flap.  The donor area for the pedicle flap was then injected with anesthesia.  The flap was excised through the skin and subcutaneous tissue down to the layer of the underlying musculature.  The interpolation flap was carefully excised within this deep plane to maintain its blood supply.  The edges of the donor site were undermined.   The donor site was closed in a primary fashion.  The pedicle was then rotated into position and sutured.  Once the tube was sutured into place, adequate blood supply was confirmed with blanching and refill.  The pedicle was then wrapped with xeroform gauze and dressed appropriately with a telfa and gauze bandage to ensure continued blood supply and protect the attached pedicle.
Melolabial Interpolation Flap Text: A decision was made to reconstruct the defect utilizing an interpolation axial flap and a staged reconstruction.  A telfa template was made of the defect.  This telfa template was then used to outline the melolabial interpolation flap.  The donor area for the pedicle flap was then injected with anesthesia.  The flap was excised through the skin and subcutaneous tissue down to the layer of the underlying musculature.  The pedicle flap was carefully excised within this deep plane to maintain its blood supply.  The edges of the donor site were undermined.   The donor site was closed in a primary fashion.  The pedicle was then rotated into position and sutured.  Once the tube was sutured into place, adequate blood supply was confirmed with blanching and refill.  The pedicle was then wrapped with xeroform gauze and dressed appropriately with a telfa and gauze bandage to ensure continued blood supply and protect the attached pedicle.
Mastoid Interpolation Flap Text: A decision was made to reconstruct the defect utilizing an interpolation axial flap and a staged reconstruction.  A telfa template was made of the defect.  This telfa template was then used to outline the mastoid interpolation flap.  The donor area for the pedicle flap was then injected with anesthesia.  The flap was excised through the skin and subcutaneous tissue down to the layer of the underlying musculature.  The pedicle flap was carefully excised within this deep plane to maintain its blood supply.  The edges of the donor site were undermined.   The donor site was closed in a primary fashion.  The pedicle was then rotated into position and sutured.  Once the tube was sutured into place, adequate blood supply was confirmed with blanching and refill.  The pedicle was then wrapped with xeroform gauze and dressed appropriately with a telfa and gauze bandage to ensure continued blood supply and protect the attached pedicle.
Paramedian Forehead Flap Text: A decision was made to reconstruct the defect utilizing an interpolation axial flap and a staged reconstruction.  A telfa template was made of the defect.  This telfa template was then used to outline the paramedian forehead pedicle flap.  The donor area for the pedicle flap was then injected with anesthesia.  The flap was excised through the skin and subcutaneous tissue down to the layer of the underlying musculature.  The pedicle flap was carefully excised within this deep plane to maintain its blood supply.  The edges of the donor site were undermined.   The donor site was closed in a primary fashion.  The pedicle was then rotated into position and sutured.  Once the tube was sutured into place, adequate blood supply was confirmed with blanching and refill.  The pedicle was then wrapped with xeroform gauze and dressed appropriately with a telfa and gauze bandage to ensure continued blood supply and protect the attached pedicle.
Posterior Auricular Interpolation Flap Text: A decision was made to reconstruct the defect utilizing an interpolation axial flap and a staged reconstruction.  A telfa template was made of the defect.  This telfa template was then used to outline the posterior auricular interpolation flap.  The donor area for the pedicle flap was then injected with anesthesia.  The flap was excised through the skin and subcutaneous tissue down to the layer of the underlying musculature.  The pedicle flap was carefully excised within this deep plane to maintain its blood supply.  The edges of the donor site were undermined.   The donor site was closed in a primary fashion.  The pedicle was then rotated into position and sutured.  Once the tube was sutured into place, adequate blood supply was confirmed with blanching and refill.  The pedicle was then wrapped with xeroform gauze and dressed appropriately with a telfa and gauze bandage to ensure continued blood supply and protect the attached pedicle.
Cheiloplasty (Complex) Text: A decision was made to reconstruct the defect with a  cheiloplasty.  The defect was undermined extensively.  Additional obicularis oris muscle was excised with a 15 blade scalpel.  The defect was converted into a full thickness wedge to facilite a better cosmetic result.  Small vessels were then tied off with 5-0 monocyrl. The obicularis oris, superficial fascia, adipose and dermis were then reapproximated.  After the deeper layers were approximated the epidermis was reapproximated with particular care given to realign the vermilion border.
Cheiloplasty (Less Than 50%) Text: A decision was made to reconstruct the defect with a  cheiloplasty.  The defect was undermined extensively.  Additional obicularis oris muscle was excised with a 15 blade scalpel.  The defect was converted into a full thickness wedge, of less than 50% of the vertical height of the lip, to facilite a better cosmetic result.  Small vessels were then tied off with 5-0 monocyrl. The obicularis oris, superficial fascia, adipose and dermis were then reapproximated.  After the deeper layers were approximated the epidermis was reapproximated with particular care given to realign the vermilion border.
Ear Wedge Repair Text: A wedge excision was completed by carrying down an excision through the full thickness of the ear and cartilage with an inward facing Burow's triangle. The wound was then closed in a layered fashion.
Full Thickness Lip Wedge Repair (Flap) Text: Given the location of the defect and the proximity to free margins a full thickness wedge repair was deemed most appropriate.  Using a sterile surgical marker, the appropriate repair was drawn incorporating the defect and placing the expected incisions perpendicular to the vermilion border.  The vermilion border was also meticulously outlined to ensure appropriate reapproximation during the repair.  The area thus outlined was incised through and through with a #15 scalpel blade.  The muscularis and dermis were reaproximated with deep sutures following hemostasis. Care was taken to realign the vermilion border before proceeding with the superficial closure.  Once the vermilion was realigned the superfical and mucosal closure was finished.
Burow's Graft Text: The defect edges were debeveled with a #15 scalpel blade. Given the location of the defect, shape of the defect, the proximity to free margins and the presence of a standing cone deformity a Burow's skin graft was deemed most appropriate. The standing cone was removed and this tissue was then trimmed to the shape of the primary defect. The adipose tissue was also removed until only dermis and epidermis were left.  The skin graft was then placed in the primary defect and oriented appropriately.
Cartilage Graft Text: The defect edges were debeveled with a #15 scalpel blade.  Given the location of the defect, shape of the defect, the fact the defect involved a full thickness cartilage defect a cartilage graft was deemed most appropriate.  An appropriate donor site was identified, cleansed, and anesthetized. The cartilage graft was then harvested and transferred to the recipient site, oriented appropriately and then sutured into place.  The secondary defect was then repaired using a primary closure.
Composite Graft Text: The defect edges were debeveled with a #15 scalpel blade.  Given the location of the defect, shape of the defect, the proximity to free margins and the fact the defect was full thickness a composite graft was deemed most appropriate.  The defect was outline and then transferred to the donor site.  A full thickness graft was then excised from the donor site. The graft was then placed in the primary defect, oriented appropriately and then sutured into place.  The secondary defect was then repaired using a primary closure.
Epidermal Autograft Text: The defect edges were debeveled with a #15 scalpel blade.  Given the location of the defect, shape of the defect and the proximity to free margins an epidermal autograft was deemed most appropriate.  Using a sterile surgical marker, the primary defect shape was transferred to the donor site. The epidermal graft was then harvested.  The skin graft was then placed in the primary defect and oriented appropriately.
Dermal Autograft Text: The defect edges were debeveled with a #15 scalpel blade.  Given the location of the defect, shape of the defect and the proximity to free margins a dermal autograft was deemed most appropriate.  Using a sterile surgical marker, the primary defect shape was transferred to the donor site. The area thus outlined was incised deep to adipose tissue with a #15 scalpel blade.  The harvested graft was then trimmed of adipose and epidermal tissue until only dermis was left.  The skin graft was then placed in the primary defect and oriented appropriately.
Ftsg Text: The defect edges were debeveled with a #15 scalpel blade.  Given the location of the defect, shape of the defect and the proximity to free margins a full thickness skin graft was deemed most appropriate.  Using a sterile surgical marker, the primary defect shape was transferred to the donor site. The area thus outlined was incised deep to adipose tissue with a #15 scalpel blade.  The harvested graft was then trimmed of adipose tissue until only dermis and epidermis was left.  The skin margins of the secondary defect were undermined to an appropriate distance in all directions utilizing iris scissors.  The secondary defect was closed with interrupted buried subcutaneous sutures.  The skin edges were then re-apposed with running  sutures.  The skin graft was then placed in the primary defect and oriented appropriately.
Pinch Graft Text: The defect edges were debeveled with a #15 scalpel blade. Given the location of the defect, shape of the defect and the proximity to free margins a pinch graft was deemed most appropriate. Using a sterile surgical marker, the primary defect shape was transferred to the donor site. The area thus outlined was incised deep to adipose tissue with a #15 scalpel blade.  The harvested graft was then trimmed of adipose tissue until only dermis and epidermis was left. The skin graft was then placed in the primary defect and oriented appropriately.
Skin Substitute Text: The defect edges were debeveled with a #15 scalpel blade.  Given the location of the defect, shape of the defect and the proximity to free margins a skin substitute graft was deemed most appropriate.  The graft material was trimmed to fit the size of the defect. The graft was then placed in the primary defect and oriented appropriately.
Split-Thickness Skin Graft Text: The defect edges were debeveled with a #15 scalpel blade.  Given the location of the defect, shape of the defect and the proximity to free margins a split thickness skin graft was deemed most appropriate.  Using a sterile surgical marker, the primary defect shape was transferred to the donor site. The split thickness graft was then harvested.  The skin graft was then placed in the primary defect and oriented appropriately.
Tissue Cultured Epidermal Autograft Text: The defect edges were debeveled with a #15 scalpel blade.  Given the location of the defect, shape of the defect and the proximity to free margins a tissue cultured epidermal autograft was deemed most appropriate.  The graft was then trimmed to fit the size of the defect.  The graft was then placed in the primary defect and oriented appropriately.
Xenograft Text: The defect edges were debeveled with a #15 scalpel blade.  Given the location of the defect, shape of the defect and the proximity to free margins a xenograft was deemed most appropriate.  The graft was then trimmed to fit the size of the defect.  The graft was then placed in the primary defect and oriented appropriately.
Complex Repair And Flap Additional Text (Will Appearing After The Standard Complex Repair Text): The complex repair was not sufficient to completely close the primary defect. The remaining additional defect was repaired with the flap mentioned below.
Complex Repair And Graft Additional Text (Will Appearing After The Standard Complex Repair Text): The complex repair was not sufficient to completely close the primary defect. The remaining additional defect was repaired with the graft mentioned below.
Eyelid Full Thickness Repair - 59601: The eyelid defect was full thickness which required a wedge repair of the eyelid. Special care was taken to ensure that the eyelid margin was realligned when placing sutures.
Eyelid Partial Thickness Repair - 41943: The eyelid defect was partial thickness which required a wedge repair of the eyelid. Special care was taken to ensure that the eyelid margin was realligned when placing sutures.
Intermediate Repair And Flap Additional Text (Will Appearing After The Standard Complex Repair Text): The intermediate repair was not sufficient to completely close the primary defect. The remaining additional defect was repaired with the flap mentioned below.
Intermediate Repair And Graft Additional Text (Will Appearing After The Standard Complex Repair Text): The intermediate repair was not sufficient to completely close the primary defect. The remaining additional defect was repaired with the graft mentioned below.
Localized Dermabrasion With 15 Blade Text: The patient was draped in routine manner.  Localized dermabrasion using a 15 blade was performed in routine manner to papillary dermis. This spot dermabrasion is being performed to complete skin cancer reconstruction. It also will eliminate the other sun damaged precancerous cells that are known to be part of the regional effect of a lifetime's worth of sun exposure. This localized dermabrasion is therapeutic and should not be considered cosmetic in any regard.
Localized Dermabrasion With Sand Papertext: The patient was draped in routine manner.  Localized dermabrasion using sterile sand paper was performed in routine manner to papillary dermis. This spot dermabrasion is being performed to complete skin cancer reconstruction. It also will eliminate the other sun damaged precancerous cells that are known to be part of the regional effect of a lifetime's worth of sun exposure. This localized dermabrasion is therapeutic and should not be considered cosmetic in any regard.
Localized Dermabrasion With Wire Brush Text: The patient was draped in routine manner.  Localized dermabrasion using 3 x 17 mm wire brush was performed in routine manner to papillary dermis. This spot dermabrasion is being performed to complete skin cancer reconstruction. It also will eliminate the other sun damaged precancerous cells that are known to be part of the regional effect of a lifetime's worth of sun exposure. This localized dermabrasion is therapeutic and should not be considered cosmetic in any regard.
Purse String (Simple) Text: Given the location of the defect and the characteristics of the surrounding skin a purse string closure was deemed most appropriate.  Undermining was performed circumfirentially around the surgical defect.  A purse string suture was then placed and tightened.
Purse String (Intermediate) Text: Given the location of the defect and the characteristics of the surrounding skin a purse string intermediate closure was deemed most appropriate.  Undermining was performed circumfirentially around the surgical defect.  A purse string suture was then placed and tightened.
Partial Purse String (Simple) Text: Given the location of the defect and the characteristics of the surrounding skin a simple purse string closure was deemed most appropriate.  Undermining was performed circumfirentially around the surgical defect.  A purse string suture was then placed and tightened. Wound tension only allowed a partial closure of the circular defect.
Partial Purse String (Intermediate) Text: Given the location of the defect and the characteristics of the surrounding skin an intermediate purse string closure was deemed most appropriate.  Undermining was performed circumfirentially around the surgical defect.  A purse string suture was then placed and tightened. Wound tension only allowed a partial closure of the circular defect.
Tarsorrhaphy Text: A tarsorrhaphy was performed using Frost sutures.
Manual Repair Warning Statement: We plan on removing the manually selected variable below in favor of our much easier automatic structured text blocks found in the previous tab. We decided to do this to help make the flow better and give you the full power of structured data. Manual selection is never going to be ideal in our platform and I would encourage you to avoid using manual selection from this point on, especially since I will be sunsetting this feature. It is important that you do one of two things with the customized text below. First, you can save all of the text in a word file so you can have it for future reference. Second, transfer the text to the appropriate area in the Library tab. Lastly, if there is a flap or graft type which we do not have you need to let us know right away so I can add it in before the variable is hidden. No need to panic, we plan to give you roughly 6 months to make the change.
Same Histology In Subsequent Stages Text: The pattern and morphology of the tumor is as described in the first stage.
No Residual Tumor Seen Histology Text: There were no malignant cells seen in the sections examined.
Inflammation Suggestive Of Cancer Camouflage Histology Text: There was a dense lymphocytic infiltrate which prevented adequate histologic evaluation of adjacent structures.
Bcc Histology Text: There were numerous aggregates of basaloid cells.
Bcc Infiltrative Histology Text: There were numerous aggregates of basaloid cells demonstrating an infiltrative pattern.
Mart-1 - Positive Histology Text: MART-1 staining demonstrates areas of higher density and clustering of melanocytes with Pagetoid spread upwards within the epidermis. The surgical margins are positive for tumor cells.
Mart-1 - Negative Histology Text: MART-1 staining demonstrates a normal density and pattern of melanocytes along the dermal-epidermal junction. The surgical margins are negative for tumor cells.
Information: Selecting Yes will display possible errors in your note based on the variables you have selected. This validation is only offered as a suggestion for you. PLEASE NOTE THAT THE VALIDATION TEXT WILL BE REMOVED WHEN YOU FINALIZE YOUR NOTE. IF YOU WANT TO FAX A PRELIMINARY NOTE YOU WILL NEED TO TOGGLE THIS TO 'NO' IF YOU DO NOT WANT IT IN YOUR FAXED NOTE.
Bill 59 Modifier?: No - Continue to Bill 79 Modifier

## 2024-08-08 ENCOUNTER — APPOINTMENT (RX ONLY)
Dept: URBAN - METROPOLITAN AREA CLINIC 22 | Facility: CLINIC | Age: 59
Setting detail: DERMATOLOGY
End: 2024-08-08

## 2024-08-08 DIAGNOSIS — Z48.02 ENCOUNTER FOR REMOVAL OF SUTURES: ICD-10-CM

## 2024-08-08 PROCEDURE — ? SUTURE REMOVAL (GLOBAL PERIOD)

## 2024-08-08 PROCEDURE — 99024 POSTOP FOLLOW-UP VISIT: CPT

## 2024-08-08 ASSESSMENT — LOCATION ZONE DERM: LOCATION ZONE: FACE

## 2024-08-08 ASSESSMENT — LOCATION SIMPLE DESCRIPTION DERM: LOCATION SIMPLE: LEFT CHEEK

## 2024-08-08 ASSESSMENT — LOCATION DETAILED DESCRIPTION DERM: LOCATION DETAILED: LEFT MEDIAL MALAR CHEEK

## 2024-08-08 NOTE — PROCEDURE: SUTURE REMOVAL (GLOBAL PERIOD)
Detail Level: Detailed
Add 70990 Cpt? (Important Note: In 2017 The Use Of 35558 Is Being Tracked By Cms To Determine Future Global Period Reimbursement For Global Periods): yes

## 2024-09-12 ENCOUNTER — OFFICE VISIT (OUTPATIENT)
Dept: CARDIOLOGY | Facility: MEDICAL CENTER | Age: 59
End: 2024-09-12
Attending: INTERNAL MEDICINE
Payer: COMMERCIAL

## 2024-09-12 ENCOUNTER — HOSPITAL ENCOUNTER (OUTPATIENT)
Dept: LAB | Facility: MEDICAL CENTER | Age: 59
End: 2024-09-12
Attending: INTERNAL MEDICINE
Payer: COMMERCIAL

## 2024-09-12 VITALS
WEIGHT: 195 LBS | DIASTOLIC BLOOD PRESSURE: 80 MMHG | SYSTOLIC BLOOD PRESSURE: 116 MMHG | HEIGHT: 61 IN | HEART RATE: 65 BPM | RESPIRATION RATE: 16 BRPM | BODY MASS INDEX: 36.82 KG/M2 | OXYGEN SATURATION: 98 %

## 2024-09-12 DIAGNOSIS — I48.0 PAF (PAROXYSMAL ATRIAL FIBRILLATION) (HCC): ICD-10-CM

## 2024-09-12 DIAGNOSIS — R07.89 OTHER CHEST PAIN: ICD-10-CM

## 2024-09-12 DIAGNOSIS — R06.09 DYSPNEA ON EXERTION: ICD-10-CM

## 2024-09-12 PROBLEM — R00.2 PALPITATIONS: Status: RESOLVED | Noted: 2018-04-25 | Resolved: 2024-09-12

## 2024-09-12 LAB
EKG IMPRESSION: NORMAL
EOSINOPHIL # BLD AUTO: 0.04 K/UL (ref 0–0.51)

## 2024-09-12 PROCEDURE — 99214 OFFICE O/P EST MOD 30 MIN: CPT | Performed by: INTERNAL MEDICINE

## 2024-09-12 PROCEDURE — 82785 ASSAY OF IGE: CPT

## 2024-09-12 PROCEDURE — 3074F SYST BP LT 130 MM HG: CPT | Performed by: INTERNAL MEDICINE

## 2024-09-12 PROCEDURE — 86003 ALLG SPEC IGE CRUDE XTRC EA: CPT | Mod: 91

## 2024-09-12 PROCEDURE — 3079F DIAST BP 80-89 MM HG: CPT | Performed by: INTERNAL MEDICINE

## 2024-09-12 PROCEDURE — 99211 OFF/OP EST MAY X REQ PHY/QHP: CPT | Performed by: INTERNAL MEDICINE

## 2024-09-12 PROCEDURE — 85004 AUTOMATED DIFF WBC COUNT: CPT

## 2024-09-12 PROCEDURE — 36415 COLL VENOUS BLD VENIPUNCTURE: CPT

## 2024-09-12 PROCEDURE — 93010 ELECTROCARDIOGRAM REPORT: CPT | Performed by: INTERNAL MEDICINE

## 2024-09-12 PROCEDURE — 93005 ELECTROCARDIOGRAM TRACING: CPT | Performed by: INTERNAL MEDICINE

## 2024-09-12 ASSESSMENT — ENCOUNTER SYMPTOMS
CLAUDICATION: 0
FEVER: 0
HEARTBURN: 0
SYNCOPE: 0
VOMITING: 0
CONSTIPATION: 0
PND: 0
DECREASED APPETITE: 0
WEIGHT LOSS: 0
DEPRESSION: 0
ORTHOPNEA: 0
BACK PAIN: 0
SHORTNESS OF BREATH: 0
NEAR-SYNCOPE: 0
DYSPNEA ON EXERTION: 0
PALPITATIONS: 0
DIARRHEA: 0
IRREGULAR HEARTBEAT: 0
COUGH: 0
WEIGHT GAIN: 0
ABDOMINAL PAIN: 0
ALTERED MENTAL STATUS: 0
DIZZINESS: 0
FLANK PAIN: 0
NAUSEA: 0
BLURRED VISION: 0

## 2024-09-12 ASSESSMENT — FIBROSIS 4 INDEX: FIB4 SCORE: 0.86

## 2024-09-15 LAB
COW MILK IGE QN: <0.1 KU/L
DEPRECATED MISC ALLERGEN IGE RAST QL: NORMAL
EGG WHITE IGE QN: <0.1 KU/L
IGE SERPL-ACNC: 44 KU/L
OAT IGE QN: <0.1 KU/L
SOYBEAN IGE QN: <0.1 KU/L
WHEAT IGE QN: <0.1 KU/L

## 2024-09-25 ENCOUNTER — APPOINTMENT (OUTPATIENT)
Dept: RADIOLOGY | Facility: MEDICAL CENTER | Age: 59
End: 2024-09-25
Attending: INTERNAL MEDICINE
Payer: COMMERCIAL

## 2024-10-03 DIAGNOSIS — G47.33 OSA (OBSTRUCTIVE SLEEP APNEA): ICD-10-CM

## 2024-10-03 DIAGNOSIS — R06.02 SOB (SHORTNESS OF BREATH): ICD-10-CM

## 2024-10-08 ENCOUNTER — NON-PROVIDER VISIT (OUTPATIENT)
Dept: SLEEP MEDICINE | Facility: MEDICAL CENTER | Age: 59
End: 2024-10-08
Attending: INTERNAL MEDICINE
Payer: COMMERCIAL

## 2024-10-08 VITALS — WEIGHT: 195 LBS | HEIGHT: 60 IN | BODY MASS INDEX: 38.28 KG/M2

## 2024-10-08 DIAGNOSIS — G47.33 OSA (OBSTRUCTIVE SLEEP APNEA): ICD-10-CM

## 2024-10-08 DIAGNOSIS — R06.02 SOB (SHORTNESS OF BREATH): ICD-10-CM

## 2024-10-08 PROCEDURE — 94726 PLETHYSMOGRAPHY LUNG VOLUMES: CPT | Mod: 26 | Performed by: STUDENT IN AN ORGANIZED HEALTH CARE EDUCATION/TRAINING PROGRAM

## 2024-10-08 PROCEDURE — 94060 EVALUATION OF WHEEZING: CPT | Mod: 26 | Performed by: STUDENT IN AN ORGANIZED HEALTH CARE EDUCATION/TRAINING PROGRAM

## 2024-10-08 PROCEDURE — 94729 DIFFUSING CAPACITY: CPT | Performed by: STUDENT IN AN ORGANIZED HEALTH CARE EDUCATION/TRAINING PROGRAM

## 2024-10-08 PROCEDURE — 94729 DIFFUSING CAPACITY: CPT | Mod: 26 | Performed by: STUDENT IN AN ORGANIZED HEALTH CARE EDUCATION/TRAINING PROGRAM

## 2024-10-08 PROCEDURE — 94060 EVALUATION OF WHEEZING: CPT | Performed by: STUDENT IN AN ORGANIZED HEALTH CARE EDUCATION/TRAINING PROGRAM

## 2024-10-08 PROCEDURE — 94726 PLETHYSMOGRAPHY LUNG VOLUMES: CPT | Performed by: STUDENT IN AN ORGANIZED HEALTH CARE EDUCATION/TRAINING PROGRAM

## 2024-10-08 ASSESSMENT — FIBROSIS 4 INDEX: FIB4 SCORE: 0.86

## 2024-11-02 DIAGNOSIS — I48.0 PAF (PAROXYSMAL ATRIAL FIBRILLATION) (HCC): ICD-10-CM

## 2024-11-05 RX ORDER — FLECAINIDE ACETATE 50 MG/1
50 TABLET ORAL 2 TIMES DAILY
Qty: 180 TABLET | Refills: 1 | Status: SHIPPED | OUTPATIENT
Start: 2024-11-05

## 2024-11-11 ENCOUNTER — HOSPITAL ENCOUNTER (OUTPATIENT)
Dept: RADIOLOGY | Facility: MEDICAL CENTER | Age: 59
End: 2024-11-11
Attending: INTERNAL MEDICINE
Payer: COMMERCIAL

## 2024-11-11 DIAGNOSIS — R06.09 DYSPNEA ON EXERTION: ICD-10-CM

## 2024-11-11 DIAGNOSIS — R07.89 OTHER CHEST PAIN: ICD-10-CM

## 2024-11-11 PROCEDURE — A9502 TC99M TETROFOSMIN: HCPCS

## 2024-11-12 PROCEDURE — 78452 HT MUSCLE IMAGE SPECT MULT: CPT | Mod: 26 | Performed by: INTERNAL MEDICINE

## 2024-11-12 PROCEDURE — 93018 CV STRESS TEST I&R ONLY: CPT | Performed by: INTERNAL MEDICINE

## 2025-03-10 ENCOUNTER — OFFICE VISIT (OUTPATIENT)
Dept: URGENT CARE | Facility: PHYSICIAN GROUP | Age: 60
End: 2025-03-10
Payer: COMMERCIAL

## 2025-03-10 VITALS
WEIGHT: 192 LBS | HEART RATE: 76 BPM | TEMPERATURE: 97 F | SYSTOLIC BLOOD PRESSURE: 112 MMHG | RESPIRATION RATE: 16 BRPM | HEIGHT: 61 IN | DIASTOLIC BLOOD PRESSURE: 72 MMHG | BODY MASS INDEX: 36.25 KG/M2 | OXYGEN SATURATION: 95 %

## 2025-03-10 DIAGNOSIS — H66.001 ACUTE SUPPURATIVE OTITIS MEDIA OF RIGHT EAR WITHOUT SPONTANEOUS RUPTURE OF TYMPANIC MEMBRANE, RECURRENCE NOT SPECIFIED: ICD-10-CM

## 2025-03-10 PROCEDURE — 3074F SYST BP LT 130 MM HG: CPT | Performed by: FAMILY MEDICINE

## 2025-03-10 PROCEDURE — 99214 OFFICE O/P EST MOD 30 MIN: CPT | Performed by: FAMILY MEDICINE

## 2025-03-10 PROCEDURE — 3078F DIAST BP <80 MM HG: CPT | Performed by: FAMILY MEDICINE

## 2025-03-10 RX ORDER — FLUTICASONE FUROATE AND VILANTEROL TRIFENATATE 100; 25 UG/1; UG/1
POWDER RESPIRATORY (INHALATION)
COMMUNITY
Start: 2025-03-05

## 2025-03-10 RX ORDER — FLUTICASONE PROPIONATE AND SALMETEROL 250; 50 UG/1; UG/1
POWDER RESPIRATORY (INHALATION)
COMMUNITY
Start: 2025-03-05

## 2025-03-10 NOTE — PROGRESS NOTES
Subjective:      Chief Complaint   Patient presents with    Otalgia     Right ear pain x 3 days                Otalgia- RT  This is a new problem. The current episode started in the past 3 days. The problem occurs constantly. The problem has been unchanged. Associated symptoms include fever and coughing.         Pertinent negatives include no abdominal pain, chest pain, chills,  , headaches, joint swelling, myalgias, nausea, neck pain, rash or visual change. Nothing aggravates the symptoms. She has tried nothing for the symptoms.     Social History     Tobacco Use    Smoking status: Former     Current packs/day: 0.00     Average packs/day: 0.5 packs/day for 6.0 years (3.0 ttl pk-yrs)     Types: Cigarettes     Start date: 1990     Quit date: 1996     Years since quittin.2    Smokeless tobacco: Never   Vaping Use    Vaping status: Never Used   Substance Use Topics    Alcohol use: Yes     Comment: occ    Drug use: No         Current Outpatient Medications on File Prior to Visit   Medication Sig Dispense Refill    BREO ELLIPTA 100-25 MCG/ACT AEROSOL POWDER, BREATH ACTIVATED       WIXELA INHUB 250-50 MCG/ACT AEROSOL POWDER, BREATH ACTIVATED       flecainide (TAMBOCOR) 50 MG tablet TAKE 1 TABLET BY MOUTH TWICE DAILY 180 Tablet 1    LANSOPRAZOLE PO lansoprazole      metoprolol SR (TOPROL XL) 25 MG TABLET SR 24 HR Take 1 Tablet by mouth every day. 90 Tablet 3    aspirin EC (ECOTRIN) 81 MG Tablet Delayed Response Take 1 tablet by mouth every day. 30 tablet     albuterol 108 (90 Base) MCG/ACT Aero Soln inhalation aerosol Inhale 2 Puffs by mouth every four hours as needed (wheezing). 1 Inhaler 0     No current facility-administered medications on file prior to visit.         Past Medical History:   Diagnosis Date    PAF (paroxysmal atrial fibrillation) ([facility]) 2020    Infectious disease 2014    step throat    Allergy     Anesthesia     clautraphobic unable to tolerated mask    Apnea, sleep     Arthritis      "hands and feet    Daytime sleepiness     sometimes    Gasping for breath     Hemorrhoids     Indigestion     Insomnia     Migraines     Morning headache     Pleurisy          Family History   Adopted: Yes   Problem Relation Age of Onset    Other Mother         hypothyroid    Diabetes Mother     Hypertension Mother     Hyperlipidemia Mother     Thyroid Mother     No Known Problems Father         Adopted by father    No Known Problems Maternal Grandmother     No Known Problems Maternal Grandfather     No Known Problems Sister         Half sister          Review of Systems   Constitutional: +FERVER  HENT: Positive for congestion and ear pain. Negative for hearing loss and tinnitus.    Respiratory:   Negative for hemoptysis, shortness of breath and wheezing.    Cardiovascular: Negative for chest pain, palpitations and leg swelling.   Gastrointestinal: Negative for nausea and abdominal pain.   Musculoskeletal: Negative for myalgias, joint swelling and neck pain.   Skin: Negative for rash.   Neurological: Negative for headaches.   All other systems reviewed and are negative.         Objective:     /72   Pulse 76   Temp 36.1 °C (97 °F) (Temporal)   Resp 16   Ht 1.549 m (5' 1\")   Wt 87.1 kg (192 lb)   SpO2 95%     Physical Exam   Constitutional: Vital signs are normal.  No distress.   HENT:   Head: There is normal jaw occlusion.       RT  Ear: External ear normal. Tympanic membrane is abnormal - erythematous and bulging. A middle ear effusion is present.   Nose: Rhinorrhea and congestion present. No nasal discharge.   Mouth/Throat: Mucous membranes are moist. No oral lesions.  . No oropharyngeal exudate, pharynx swelling or pharynx petechiae.    Eyes: Conjunctivae and EOM are normal. Pupils are equal, round, and reactive to light. Right eye exhibits no discharge. Left eye exhibits no discharge.   Neck: Normal range of motion. Neck supple.  .   Cardiovascular: Normal rate and regular rhythm.  Pulses are palpable. "    No murmur heard.  Pulmonary/Chest: Effort normal and breath sounds normal. There is normal air entry. No respiratory distress. no wheezes, rhonchi,  retraction.   Musculoskeletal:   no edema.   Neurological: A/O x 3.   CN 2-12 intact   Skin: Skin is warm. Capillary refill takes less than 3 seconds. No purpura and no rash noted. Patient is not diaphoretic. No jaundice or pallor.   Nursing note and vitals reviewed.              Assessment/Plan:     1. Acute suppurative otitis media of right ear without spontaneous rupture of tympanic membrane, recurrence not specified     - amoxicillin-clavulanate (AUGMENTIN) 875-125 MG Tab; Take 1 Tablet by mouth 2 times a day for 7 days.  Dispense: 14 Tablet; Refill: 0      Differential diagnosis, natural history, supportive care, and indications for immediate follow-up discussed. All questions answered. Patient agrees with the plan of care.     Follow-up as needed if symptoms worsen or fail to improve to PCP, Urgent care or Emergency Room.     I have personally reviewed prior external notes and test results pertinent to today's visit.  I have independently reviewed and interpreted all diagnostics ordered during this urgent care acute visit.

## 2025-05-03 DIAGNOSIS — I48.0 PAF (PAROXYSMAL ATRIAL FIBRILLATION) (HCC): ICD-10-CM

## 2025-05-05 NOTE — TELEPHONE ENCOUNTER
Phone Number Called: [phone]      Call outcome: Did not leave a detailed message. Requested patient to call back.    Message: Called to inform patient that in order to refill their flecainide they need to completed an EKG. Order placed.

## 2025-05-06 ENCOUNTER — TELEPHONE (OUTPATIENT)
Dept: CARDIOLOGY | Facility: MEDICAL CENTER | Age: 60
End: 2025-05-06
Payer: COMMERCIAL

## 2025-05-06 NOTE — TELEPHONE ENCOUNTER
Phone Number Called: [phone]    Call outcome: Spoke to patient regarding message below.    Message: Called to discuss EKG monitoring requirement for Flecainide. Pt verbalized understanding. Non-provider scheduled for Friday at 11:00 am. If possible will reschedule for Wednesday 5/7/25. Pt verbalized understanding.

## 2025-05-06 NOTE — TELEPHONE ENCOUNTER
VR    Caller: Ashley Roman Eleno    Topic/issue: Patient called because she received a message that in order to get her medication refilled she needs to get an EKG, but there is no order in the chart. She also said she has not had to do this before and is not due for fv until September. See med refill encounter from 05.03.25 and she thinks she only has about a week left of medication. She would like a callback.     Please advise.    Callback Number: [phone]    Thank you,    Fauzia GRANADOS

## 2025-05-09 ENCOUNTER — NON-PROVIDER VISIT (OUTPATIENT)
Dept: CARDIOLOGY | Facility: MEDICAL CENTER | Age: 60
End: 2025-05-09
Attending: INTERNAL MEDICINE
Payer: COMMERCIAL

## 2025-05-09 DIAGNOSIS — I48.0 PAF (PAROXYSMAL ATRIAL FIBRILLATION) (HCC): ICD-10-CM

## 2025-05-09 LAB — EKG IMPRESSION: NORMAL

## 2025-05-09 PROCEDURE — 93010 ELECTROCARDIOGRAM REPORT: CPT | Performed by: INTERNAL MEDICINE

## 2025-05-09 PROCEDURE — 93005 ELECTROCARDIOGRAM TRACING: CPT | Mod: TC

## 2025-05-09 RX ORDER — FLECAINIDE ACETATE 50 MG/1
50 TABLET ORAL 2 TIMES DAILY
Qty: 180 TABLET | Refills: 1 | Status: SHIPPED | OUTPATIENT
Start: 2025-05-09

## 2025-05-09 NOTE — NON-PROVIDER
Patient was here today for EKG. EKG performed and transferred into patients chart. Paper copy of EKG given to Shreya SOLIMAN. RN will contact patient to follow up unless patient is symptomatic.   Is patient reporting any symptoms? No   If yes, RN to visit exam room

## 2025-07-17 ENCOUNTER — OFFICE VISIT (OUTPATIENT)
Dept: CARDIOLOGY | Facility: MEDICAL CENTER | Age: 60
End: 2025-07-17
Payer: COMMERCIAL

## 2025-07-17 VITALS
HEIGHT: 61 IN | HEART RATE: 60 BPM | SYSTOLIC BLOOD PRESSURE: 134 MMHG | OXYGEN SATURATION: 98 % | RESPIRATION RATE: 16 BRPM | DIASTOLIC BLOOD PRESSURE: 74 MMHG | WEIGHT: 194 LBS | BODY MASS INDEX: 36.63 KG/M2

## 2025-07-17 DIAGNOSIS — G90.01 CAROTID SINUS SYNCOPE: ICD-10-CM

## 2025-07-17 DIAGNOSIS — R42 DIZZINESS: Primary | ICD-10-CM

## 2025-07-17 DIAGNOSIS — I48.0 PAF (PAROXYSMAL ATRIAL FIBRILLATION) (HCC): ICD-10-CM

## 2025-07-17 DIAGNOSIS — E78.5 DYSLIPIDEMIA: ICD-10-CM

## 2025-07-17 DIAGNOSIS — R06.09 DYSPNEA ON EXERTION: ICD-10-CM

## 2025-07-17 PROCEDURE — 3078F DIAST BP <80 MM HG: CPT

## 2025-07-17 PROCEDURE — 99213 OFFICE O/P EST LOW 20 MIN: CPT

## 2025-07-17 PROCEDURE — 3075F SYST BP GE 130 - 139MM HG: CPT

## 2025-07-17 PROCEDURE — 99215 OFFICE O/P EST HI 40 MIN: CPT

## 2025-07-17 PROCEDURE — 99214 OFFICE O/P EST MOD 30 MIN: CPT

## 2025-07-17 ASSESSMENT — ENCOUNTER SYMPTOMS
SYNCOPE: 0
DIZZINESS: 1
NEAR-SYNCOPE: 0
DYSPNEA ON EXERTION: 0
PALPITATIONS: 1
ORTHOPNEA: 0
SHORTNESS OF BREATH: 0
PND: 0
LIGHT-HEADEDNESS: 1
HEADACHES: 0

## 2025-07-17 NOTE — PROGRESS NOTES
Chief Complaint   Patient presents with    Atrial Fibrillation     Follow up           Subjective:   Ashley Johansen is a 59 y.o. female who presents today for follow-up.     Patient of Dr. Zamorano.  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 Their last clinic visit was 9/12/2024 with Dr. Zamorano.    Today's visit:  Chaya reports that for the last 3 weeks she has been having pain in her next and pressure. She reports it feels tender on palpitation. She is also having palpitations. She also has had increased dizziness/lightheadedness for the last 3 weeks. She is concerned given her history of benign carotid tumor. She reports that her left leg has been having some swelling but resolves. No redness or hardness on her leg. She denies any recent illness, new medications or supplements. She is compliant with her CPAP.     Cardiovascular Risk Factors:  1. Smoking status: Former smoker  2. Type II Diabetes Mellitus: No   Lab Results   Component Value Date/Time    HBA1C 5.2 04/06/2023 04:54 AM    HBA1C 5.4 02/03/2023 05:49 AM    HBA1C 5.3 10/16/2019 09:00 PM     3. Hypertension: No  4. Dyslipidemia: Yes   Cholesterol,Tot   Date Value Ref Range Status   11/11/2021 216 (H) 100 - 199 mg/dL Final     LDL   Date Value Ref Range Status   11/11/2021 131 (H) <100 mg/dL Final     HDL   Date Value Ref Range Status   11/11/2021 66 >=40 mg/dL Final     Triglycerides   Date Value Ref Range Status   11/11/2021 97 0 - 149 mg/dL Final         Past Medical History[1]      Family History   Adopted: Yes   Problem Relation Age of Onset    Other Mother         hypothyroid    Diabetes Mother     Hypertension Mother     Hyperlipidemia Mother     Thyroid Mother     No Known Problems Father         Adopted by father    No Known Problems Maternal Grandmother     No Known Problems Maternal Grandfather     No Known Problems Sister         Half sister         Social  "History[2]      Allergies[3]      Current Outpatient Medications   Medication Sig    omeprazole (PRILOSEC) 20 MG Tablet Delayed Response delayed-release tablet Take 20 mg by mouth 2 times a day.    flecainide (TAMBOCOR) 50 MG tablet TAKE 1 TABLET BY MOUTH TWICE DAILY    BREO ELLIPTA 100-25 MCG/ACT AEROSOL POWDER, BREATH ACTIVATED     WIXELA INHUB 250-50 MCG/ACT AEROSOL POWDER, BREATH ACTIVATED     metoprolol SR (TOPROL XL) 25 MG TABLET SR 24 HR Take 1 Tablet by mouth every day.    aspirin EC (ECOTRIN) 81 MG Tablet Delayed Response Take 1 tablet by mouth every day.    albuterol 108 (90 Base) MCG/ACT Aero Soln inhalation aerosol Inhale 2 Puffs by mouth every four hours as needed (wheezing).    LANSOPRAZOLE PO lansoprazole (Patient not taking: Reported on 7/17/2025)         Review of Systems   Constitutional: Negative for malaise/fatigue.   Cardiovascular:  Positive for chest pain and palpitations. Negative for dyspnea on exertion, leg swelling, near-syncope, orthopnea, paroxysmal nocturnal dyspnea and syncope.   Respiratory:  Negative for shortness of breath.    Neurological:  Positive for dizziness and light-headedness. Negative for headaches.           Objective:   /74 (BP Location: Left arm, Patient Position: Sitting)   Pulse 60   Resp 16   Ht 1.549 m (5' 1\")   Wt 88 kg (194 lb)   SpO2 98%  Body mass index is 36.66 kg/m².         Physical Exam  Vitals reviewed.   Constitutional:       General: She is not in acute distress.     Appearance: Normal appearance.   HENT:      Head: Normocephalic and atraumatic.   Cardiovascular:      Rate and Rhythm: Normal rate and regular rhythm.      Pulses: Normal pulses.      Heart sounds: No murmur heard.  Pulmonary:      Effort: Pulmonary effort is normal. No respiratory distress.      Breath sounds: Normal breath sounds.   Musculoskeletal:      Right lower leg: No edema.      Left lower leg: No edema.   Neurological:      Mental Status: She is alert and oriented to " person, place, and time.      Gait: Gait normal.   Psychiatric:         Behavior: Behavior normal.             Lab Results   Component Value Date/Time    SODIUM 140 04/07/2023 04:47 AM    SODIUM 140 11/11/2021 07:57 AM    POTASSIUM 4.4 04/07/2023 04:47 AM    POTASSIUM 4.2 11/11/2021 07:57 AM    CHLORIDE 105 04/07/2023 04:47 AM    CHLORIDE 105 11/11/2021 07:57 AM    CO2 25.0 04/07/2023 04:47 AM    CO2 24 11/11/2021 07:57 AM    GLUCOSE 128 04/07/2023 04:47 AM    GLUCOSE 102 (H) 11/11/2021 07:57 AM    BUN 14.0 04/07/2023 04:47 AM    BUN 17 11/11/2021 07:57 AM    CREATININE 0.9 04/07/2023 04:47 AM    CREATININE 0.98 11/11/2021 07:57 AM    BUNCREATRAT 15.6 04/07/2023 04:47 AM    BUNCREATRAT 15 10/16/2019 09:00 PM      Lab Results   Component Value Date/Time    WBC 13.30 (H) 04/07/2023 04:47 AM    WBC 9.0 05/19/2022 01:08 PM    RBC 3.89 (L) 04/07/2023 04:47 AM    RBC 4.39 05/19/2022 01:08 PM    HEMOGLOBIN 11.6 (L) 04/07/2023 04:47 AM    HEMOGLOBIN 13.1 05/19/2022 01:08 PM    HEMATOCRIT 35.0 04/07/2023 04:47 AM    HEMATOCRIT 40.3 05/19/2022 01:08 PM    MCV 90.2 04/07/2023 04:47 AM    MCV 91.8 05/19/2022 01:08 PM    MCH 29.7 04/07/2023 04:47 AM    MCH 29.8 05/19/2022 01:08 PM    MCHC 33.0 (L) 04/07/2023 04:47 AM    MCHC 32.5 (L) 05/19/2022 01:08 PM    MPV 9.9 04/07/2023 04:47 AM    MPV 11.2 05/19/2022 01:08 PM    NEUTSPOLYS 86.2 04/07/2023 04:47 AM    NEUTSPOLYS 64.20 05/19/2022 01:08 PM    LYMPHOCYTES 10.0 04/07/2023 04:47 AM    LYMPHOCYTES 26.50 05/19/2022 01:08 PM    MONOCYTES 3.4 04/07/2023 04:47 AM    MONOCYTES 8.00 05/19/2022 01:08 PM    EOSINOPHILS 0.1 04/07/2023 04:47 AM    EOSINOPHILS 0.70 05/19/2022 01:08 PM    BASOPHILS 0.3 04/07/2023 04:47 AM    BASOPHILS 0.40 05/19/2022 01:08 PM      Lab Results   Component Value Date/Time    CHOLSTRLTOT 216 (H) 11/11/2021 07:57 AM     (H) 11/11/2021 07:57 AM    HDL 66 11/11/2021 07:57 AM    TRIGLYCERIDE 97 11/11/2021 07:57 AM       Lab Results   Component Value  "Date/Time    TROPONINT 10 04/14/2020 1223     No results found for: \"NTPROBNP\"  Assessment:   1. Dizziness  - CT-CTA HEAD WITH & W/O-POST PROCESS; Future  - CT-CTA NECK WITH & W/O-POST PROCESSING; Future  - EC-ECHOCARDIOGRAM COMPLETE W/O CONT; Future  - Comp Metabolic Panel; Future  - TSH+FREE T4  - CBC WITHOUT DIFFERENTIAL; Future    2. Carotid sinus syncope  - CT-CTA HEAD WITH & W/O-POST PROCESS; Future  - CT-CTA NECK WITH & W/O-POST PROCESSING; Future    3. PAF (paroxysmal atrial fibrillation) (HCC)  - EC-ECHOCARDIOGRAM COMPLETE W/O CONT; Future    4. Dyspnea on exertion  - EC-ECHOCARDIOGRAM COMPLETE W/O CONT; Future    5. Dyslipidemia  - Lipid Profile; Future  - Lipoprotein (a); Future  - HEMOGLOBIN A1C (Glycohemoglobin GHB Total/A1C with MBG Estimate); Future    Other orders  - omeprazole (PRILOSEC) 20 MG Tablet Delayed Response delayed-release tablet; Take 20 mg by mouth 2 times a day.        Medical Decision Making:  Today's Assessment / Plan:   Dizziness  Carotid sinus syncope  -patient reports new dizziness and lightheadedness for the last three weeks associated with next pain.   -CTA head and neck ordered   -echocardiogram ordered to evaluate for any structural abnormalities  -CMP, CBC, TSH-Free T4 panels ordered  -start checking blood pressure at home and keep a log    Paroxysmal Atrial Fibrillation  Hypercoagulable state due to atrial fibrillation  -CHADs-VASc score 1 (female)  -Continue metoprolol 25 mg daily for rate control  -continue flecainide 50 mg twice a day for rhythm control   -continue asa 81 mg daily    Hyperlipidemia  -Most recent   -not currently on a statin  -Goal of less than 100  -Check lipid panel with [facility]  -discussed healthy diet and lifestyle modifications     Return in about 3 months (around 10/17/2025) for Janina WHITTINGTON.  Sooner if problems.    JUSTIN Reyse              [1]   Past Medical History:  Diagnosis Date    Allergy     Anesthesia     " clautraphobic unable to tolerated mask    Apnea, sleep     Arthritis     hands and feet    Daytime sleepiness     sometimes    Gasping for breath     Hemorrhoids     Indigestion     Infectious disease 2014    step throat    Insomnia     Migraines     Morning headache     PAF (paroxysmal atrial fibrillation) ([facility]) 2020    Pleurisy    [2]   Social History  Tobacco Use    Smoking status: Former     Current packs/day: 0.00     Average packs/day: 0.5 packs/day for 6.0 years (3.0 ttl pk-yrs)     Types: Cigarettes     Start date: 1990     Quit date: 1996     Years since quittin.5    Smokeless tobacco: Never   Vaping Use    Vaping status: Never Used   Substance Use Topics    Alcohol use: Yes     Comment: occ    Drug use: No   [3]   Allergies  Allergen Reactions    Codeine Vomiting, Swelling, Unspecified and Shortness of Breath     swelling    Kiwi Extract Hives    Other Environmental Hives     Kiwi fruit

## 2025-07-23 ENCOUNTER — HOSPITAL ENCOUNTER (OUTPATIENT)
Dept: LAB | Facility: MEDICAL CENTER | Age: 60
End: 2025-07-23
Payer: COMMERCIAL

## 2025-07-23 DIAGNOSIS — E78.5 DYSLIPIDEMIA: ICD-10-CM

## 2025-07-23 DIAGNOSIS — R42 DIZZINESS: ICD-10-CM

## 2025-07-23 LAB
ALBUMIN SERPL BCP-MCNC: 4.3 G/DL (ref 3.2–4.9)
ALBUMIN/GLOB SERPL: 1.3 G/DL
ALP SERPL-CCNC: 70 U/L (ref 30–99)
ALT SERPL-CCNC: 11 U/L (ref 2–50)
ANION GAP SERPL CALC-SCNC: 12 MMOL/L (ref 7–16)
AST SERPL-CCNC: 12 U/L (ref 12–45)
BILIRUB SERPL-MCNC: 0.3 MG/DL (ref 0.1–1.5)
BUN SERPL-MCNC: 19 MG/DL (ref 8–22)
CALCIUM ALBUM COR SERPL-MCNC: 9 MG/DL (ref 8.5–10.5)
CALCIUM SERPL-MCNC: 9.2 MG/DL (ref 8.5–10.5)
CHLORIDE SERPL-SCNC: 105 MMOL/L (ref 96–112)
CHOLEST SERPL-MCNC: 231 MG/DL (ref 100–199)
CO2 SERPL-SCNC: 24 MMOL/L (ref 20–33)
CREAT SERPL-MCNC: 0.89 MG/DL (ref 0.5–1.4)
ERYTHROCYTE [DISTWIDTH] IN BLOOD BY AUTOMATED COUNT: 47.1 FL (ref 35.9–50)
EST. AVERAGE GLUCOSE BLD GHB EST-MCNC: 108 MG/DL
GFR SERPLBLD CREATININE-BSD FMLA CKD-EPI: 75 ML/MIN/1.73 M 2
GLOBULIN SER CALC-MCNC: 3.2 G/DL (ref 1.9–3.5)
GLUCOSE SERPL-MCNC: 100 MG/DL (ref 65–99)
HBA1C MFR BLD: 5.4 % (ref 4–5.6)
HCT VFR BLD AUTO: 41.5 % (ref 37–47)
HDLC SERPL-MCNC: 59 MG/DL
HGB BLD-MCNC: 13.6 G/DL (ref 12–16)
LDLC SERPL CALC-MCNC: 160 MG/DL
MCH RBC QN AUTO: 30.4 PG (ref 27–33)
MCHC RBC AUTO-ENTMCNC: 32.8 G/DL (ref 32.2–35.5)
MCV RBC AUTO: 92.6 FL (ref 81.4–97.8)
PLATELET # BLD AUTO: 231 K/UL (ref 164–446)
PMV BLD AUTO: 11.3 FL (ref 9–12.9)
POTASSIUM SERPL-SCNC: 4.5 MMOL/L (ref 3.6–5.5)
PROT SERPL-MCNC: 7.5 G/DL (ref 6–8.2)
RBC # BLD AUTO: 4.48 M/UL (ref 4.2–5.4)
SODIUM SERPL-SCNC: 141 MMOL/L (ref 135–145)
TRIGL SERPL-MCNC: 62 MG/DL (ref 0–149)
WBC # BLD AUTO: 8 K/UL (ref 4.8–10.8)

## 2025-07-23 PROCEDURE — 36415 COLL VENOUS BLD VENIPUNCTURE: CPT

## 2025-07-23 PROCEDURE — 80053 COMPREHEN METABOLIC PANEL: CPT

## 2025-07-23 PROCEDURE — 83695 ASSAY OF LIPOPROTEIN(A): CPT

## 2025-07-23 PROCEDURE — 85027 COMPLETE CBC AUTOMATED: CPT

## 2025-07-23 PROCEDURE — 80061 LIPID PANEL: CPT

## 2025-07-23 PROCEDURE — 83036 HEMOGLOBIN GLYCOSYLATED A1C: CPT

## 2025-07-24 ENCOUNTER — RESULTS FOLLOW-UP (OUTPATIENT)
Dept: CARDIOLOGY | Facility: MEDICAL CENTER | Age: 60
End: 2025-07-24
Payer: COMMERCIAL

## 2025-07-24 DIAGNOSIS — E78.5 DYSLIPIDEMIA: Primary | ICD-10-CM

## 2025-07-26 LAB — LPA SERPL-MCNC: 35 MG/DL

## 2025-07-30 ENCOUNTER — HOSPITAL ENCOUNTER (OUTPATIENT)
Dept: RADIOLOGY | Facility: MEDICAL CENTER | Age: 60
End: 2025-07-30
Payer: COMMERCIAL

## 2025-07-30 DIAGNOSIS — R42 DIZZINESS: ICD-10-CM

## 2025-07-30 DIAGNOSIS — E78.5 DYSLIPIDEMIA: ICD-10-CM

## 2025-07-30 DIAGNOSIS — G90.01 CAROTID SINUS SYNCOPE: ICD-10-CM

## 2025-07-30 PROCEDURE — 4410556 CT-CARDIAC SCORING (SELF PAY ONLY)

## 2025-07-30 PROCEDURE — 70498 CT ANGIOGRAPHY NECK: CPT

## 2025-07-30 PROCEDURE — 700117 HCHG RX CONTRAST REV CODE 255

## 2025-07-30 PROCEDURE — 70496 CT ANGIOGRAPHY HEAD: CPT

## 2025-07-30 RX ADMIN — IOHEXOL 100 ML: 350 INJECTION, SOLUTION INTRAVENOUS at 14:09

## 2025-07-31 ENCOUNTER — RESULTS FOLLOW-UP (OUTPATIENT)
Dept: CARDIOLOGY | Facility: MEDICAL CENTER | Age: 60
End: 2025-07-31
Payer: COMMERCIAL

## 2025-08-08 ENCOUNTER — TELEPHONE (OUTPATIENT)
Dept: CARDIOLOGY | Facility: MEDICAL CENTER | Age: 60
End: 2025-08-08
Payer: COMMERCIAL

## 2025-08-11 DIAGNOSIS — I48.0 PAF (PAROXYSMAL ATRIAL FIBRILLATION) (HCC): ICD-10-CM

## 2025-08-12 ENCOUNTER — APPOINTMENT (OUTPATIENT)
Dept: CARDIOLOGY | Facility: MEDICAL CENTER | Age: 60
End: 2025-08-12
Attending: INTERNAL MEDICINE
Payer: COMMERCIAL

## 2025-08-12 RX ORDER — FLECAINIDE ACETATE 50 MG/1
50 TABLET ORAL 2 TIMES DAILY
Qty: 180 TABLET | Refills: 0 | OUTPATIENT
Start: 2025-08-12

## 2025-08-12 RX ORDER — METOPROLOL SUCCINATE 25 MG/1
25 TABLET, EXTENDED RELEASE ORAL DAILY
Qty: 90 TABLET | Refills: 3 | Status: SHIPPED | OUTPATIENT
Start: 2025-08-12

## 2025-08-14 DIAGNOSIS — I48.0 PAF (PAROXYSMAL ATRIAL FIBRILLATION) (HCC): ICD-10-CM

## 2025-08-14 RX ORDER — FLECAINIDE ACETATE 50 MG/1
50 TABLET ORAL 2 TIMES DAILY
Qty: 180 TABLET | Refills: 0 | Status: SHIPPED | OUTPATIENT
Start: 2025-08-14